# Patient Record
Sex: FEMALE | Race: WHITE | NOT HISPANIC OR LATINO | Employment: FULL TIME | URBAN - METROPOLITAN AREA
[De-identification: names, ages, dates, MRNs, and addresses within clinical notes are randomized per-mention and may not be internally consistent; named-entity substitution may affect disease eponyms.]

---

## 2019-02-15 ENCOUNTER — COMMUNICATION - HEALTHEAST (OUTPATIENT)
Dept: UROLOGY | Facility: CLINIC | Age: 39
End: 2019-02-15

## 2019-02-18 ENCOUNTER — COMMUNICATION - HEALTHEAST (OUTPATIENT)
Dept: UROLOGY | Facility: CLINIC | Age: 39
End: 2019-02-18

## 2019-02-20 ENCOUNTER — COMMUNICATION - HEALTHEAST (OUTPATIENT)
Dept: UROLOGY | Facility: CLINIC | Age: 39
End: 2019-02-20

## 2021-05-27 ENCOUNTER — RECORDS - HEALTHEAST (OUTPATIENT)
Dept: ADMINISTRATIVE | Facility: CLINIC | Age: 41
End: 2021-05-27

## 2021-06-02 ENCOUNTER — RECORDS - HEALTHEAST (OUTPATIENT)
Dept: ADMINISTRATIVE | Facility: CLINIC | Age: 41
End: 2021-06-02

## 2021-06-03 ENCOUNTER — RECORDS - HEALTHEAST (OUTPATIENT)
Dept: ADMINISTRATIVE | Facility: CLINIC | Age: 41
End: 2021-06-03

## 2021-06-24 NOTE — TELEPHONE ENCOUNTER
Tried to call patient several times to get her in for a follow up from ED visit. Patients number on file is wrong, a man answered asking for us not to call anymore.

## 2021-06-24 NOTE — TELEPHONE ENCOUNTER
Attempted to call patient for ED f/u.  Main phone number is not correct for patient.  Message left with emergency contact to have patient call the clinic for an appointment.  Tatiana Simpson RN

## 2021-12-31 ENCOUNTER — VIRTUAL VISIT (OUTPATIENT)
Dept: FAMILY MEDICINE | Facility: CLINIC | Age: 41
End: 2021-12-31

## 2021-12-31 DIAGNOSIS — Z91.199 NO-SHOW FOR APPOINTMENT: Primary | ICD-10-CM

## 2022-07-24 ENCOUNTER — HOSPITAL ENCOUNTER (INPATIENT)
Facility: CLINIC | Age: 42
LOS: 2 days | Discharge: HOME | DRG: 386 | End: 2022-07-26
Attending: EMERGENCY MEDICINE | Admitting: INTERNAL MEDICINE
Payer: COMMERCIAL

## 2022-07-24 ENCOUNTER — APPOINTMENT (OUTPATIENT)
Dept: CT IMAGING | Facility: CLINIC | Age: 42
DRG: 386 | End: 2022-07-24
Attending: EMERGENCY MEDICINE
Payer: COMMERCIAL

## 2022-07-24 DIAGNOSIS — K92.2 LOWER GI BLEED: ICD-10-CM

## 2022-07-24 DIAGNOSIS — D62 ANEMIA DUE TO BLOOD LOSS, ACUTE: ICD-10-CM

## 2022-07-24 DIAGNOSIS — K62.5 BRIGHT RED BLOOD PER RECTUM: Primary | ICD-10-CM

## 2022-07-24 PROBLEM — F90.9 ADHD (ATTENTION DEFICIT HYPERACTIVITY DISORDER): Status: ACTIVE | Noted: 2022-07-24

## 2022-07-24 PROBLEM — N92.0 MENORRHAGIA WITH REGULAR CYCLE: Chronic | Status: ACTIVE | Noted: 2022-07-24

## 2022-07-24 PROBLEM — N92.0 MENORRHAGIA WITH REGULAR CYCLE: Status: ACTIVE | Noted: 2022-07-24

## 2022-07-24 PROBLEM — F39 MOOD DISORDER (H): Status: ACTIVE | Noted: 2022-07-24

## 2022-07-24 PROBLEM — F10.11 HISTORY OF ALCOHOL ABUSE: Status: ACTIVE | Noted: 2022-07-24

## 2022-07-24 LAB
ABO/RH(D): NORMAL
ALBUMIN SERPL-MCNC: 3.3 G/DL (ref 3.5–5)
ALP SERPL-CCNC: 78 U/L (ref 45–120)
ALT SERPL W P-5'-P-CCNC: 10 U/L (ref 0–45)
ANION GAP SERPL CALCULATED.3IONS-SCNC: 9 MMOL/L (ref 5–18)
ANTIBODY SCREEN: NEGATIVE
AST SERPL W P-5'-P-CCNC: 16 U/L (ref 0–40)
ATRIAL RATE - MUSE: 91 BPM
BASOPHILS # BLD AUTO: 0.1 10E3/UL (ref 0–0.2)
BASOPHILS NFR BLD AUTO: 1 %
BILIRUB SERPL-MCNC: 0.2 MG/DL (ref 0–1)
BLD PROD TYP BPU: NORMAL
BLD PROD TYP BPU: NORMAL
BLOOD COMPONENT TYPE: NORMAL
BLOOD COMPONENT TYPE: NORMAL
BUN SERPL-MCNC: 11 MG/DL (ref 8–22)
CALCIUM SERPL-MCNC: 9 MG/DL (ref 8.5–10.5)
CHLORIDE BLD-SCNC: 106 MMOL/L (ref 98–107)
CO2 SERPL-SCNC: 23 MMOL/L (ref 22–31)
CODING SYSTEM: NORMAL
CODING SYSTEM: NORMAL
CREAT SERPL-MCNC: 0.71 MG/DL (ref 0.6–1.1)
CROSSMATCH: NORMAL
CROSSMATCH: NORMAL
DIASTOLIC BLOOD PRESSURE - MUSE: 73 MMHG
EOSINOPHIL # BLD AUTO: 0.7 10E3/UL (ref 0–0.7)
EOSINOPHIL NFR BLD AUTO: 9 %
ERYTHROCYTE [DISTWIDTH] IN BLOOD BY AUTOMATED COUNT: 18.5 % (ref 10–15)
GFR SERPL CREATININE-BSD FRML MDRD: >90 ML/MIN/1.73M2
GLUCOSE BLD-MCNC: 86 MG/DL (ref 70–125)
HCG SERPL QL: NEGATIVE
HCT VFR BLD AUTO: 24.4 % (ref 35–47)
HGB BLD-MCNC: 10 G/DL (ref 11.7–15.7)
HGB BLD-MCNC: 6.8 G/DL (ref 11.7–15.7)
HOLD SPECIMEN: NORMAL
IMM GRANULOCYTES # BLD: 0 10E3/UL
IMM GRANULOCYTES NFR BLD: 0 %
INR PPP: 1.03 (ref 0.85–1.15)
INTERPRETATION ECG - MUSE: NORMAL
ISSUE DATE AND TIME: NORMAL
ISSUE DATE AND TIME: NORMAL
LYMPHOCYTES # BLD AUTO: 1.5 10E3/UL (ref 0.8–5.3)
LYMPHOCYTES NFR BLD AUTO: 21 %
MCH RBC QN AUTO: 19.8 PG (ref 26.5–33)
MCHC RBC AUTO-ENTMCNC: 27.9 G/DL (ref 31.5–36.5)
MCV RBC AUTO: 71 FL (ref 78–100)
MONOCYTES # BLD AUTO: 0.8 10E3/UL (ref 0–1.3)
MONOCYTES NFR BLD AUTO: 11 %
NEUTROPHILS # BLD AUTO: 4.3 10E3/UL (ref 1.6–8.3)
NEUTROPHILS NFR BLD AUTO: 58 %
NRBC # BLD AUTO: 0 10E3/UL
NRBC BLD AUTO-RTO: 0 /100
P AXIS - MUSE: 25 DEGREES
PLATELET # BLD AUTO: 452 10E3/UL (ref 150–450)
POTASSIUM BLD-SCNC: 3.7 MMOL/L (ref 3.5–5)
PR INTERVAL - MUSE: 130 MS
PROT SERPL-MCNC: 6.9 G/DL (ref 6–8)
QRS DURATION - MUSE: 72 MS
QT - MUSE: 358 MS
QTC - MUSE: 440 MS
R AXIS - MUSE: 59 DEGREES
RBC # BLD AUTO: 3.44 10E6/UL (ref 3.8–5.2)
SARS-COV-2 RNA RESP QL NAA+PROBE: NEGATIVE
SODIUM SERPL-SCNC: 138 MMOL/L (ref 136–145)
SPECIMEN EXPIRATION DATE: NORMAL
SYSTOLIC BLOOD PRESSURE - MUSE: 117 MMHG
T AXIS - MUSE: 68 DEGREES
UNIT ABO/RH: NORMAL
UNIT ABO/RH: NORMAL
UNIT NUMBER: NORMAL
UNIT NUMBER: NORMAL
UNIT STATUS: NORMAL
UNIT STATUS: NORMAL
UNIT TYPE ISBT: 7300
UNIT TYPE ISBT: 7300
VENTRICULAR RATE- MUSE: 91 BPM
WBC # BLD AUTO: 7.4 10E3/UL (ref 4–11)

## 2022-07-24 PROCEDURE — 250N000013 HC RX MED GY IP 250 OP 250 PS 637: Performed by: INTERNAL MEDICINE

## 2022-07-24 PROCEDURE — 250N000011 HC RX IP 250 OP 636: Performed by: EMERGENCY MEDICINE

## 2022-07-24 PROCEDURE — 258N000003 HC RX IP 258 OP 636: Performed by: INTERNAL MEDICINE

## 2022-07-24 PROCEDURE — 85018 HEMOGLOBIN: CPT | Performed by: INTERNAL MEDICINE

## 2022-07-24 PROCEDURE — 96374 THER/PROPH/DIAG INJ IV PUSH: CPT | Mod: 59

## 2022-07-24 PROCEDURE — 250N000011 HC RX IP 250 OP 636: Performed by: INTERNAL MEDICINE

## 2022-07-24 PROCEDURE — 120N000001 HC R&B MED SURG/OB

## 2022-07-24 PROCEDURE — P9016 RBC LEUKOCYTES REDUCED: HCPCS | Performed by: EMERGENCY MEDICINE

## 2022-07-24 PROCEDURE — 80053 COMPREHEN METABOLIC PANEL: CPT | Performed by: EMERGENCY MEDICINE

## 2022-07-24 PROCEDURE — 86923 COMPATIBILITY TEST ELECTRIC: CPT | Performed by: EMERGENCY MEDICINE

## 2022-07-24 PROCEDURE — 85610 PROTHROMBIN TIME: CPT | Performed by: EMERGENCY MEDICINE

## 2022-07-24 PROCEDURE — 84703 CHORIONIC GONADOTROPIN ASSAY: CPT | Performed by: EMERGENCY MEDICINE

## 2022-07-24 PROCEDURE — 96375 TX/PRO/DX INJ NEW DRUG ADDON: CPT

## 2022-07-24 PROCEDURE — 85025 COMPLETE CBC W/AUTO DIFF WBC: CPT | Performed by: EMERGENCY MEDICINE

## 2022-07-24 PROCEDURE — 86850 RBC ANTIBODY SCREEN: CPT | Performed by: EMERGENCY MEDICINE

## 2022-07-24 PROCEDURE — 258N000003 HC RX IP 258 OP 636: Performed by: EMERGENCY MEDICINE

## 2022-07-24 PROCEDURE — 99285 EMERGENCY DEPT VISIT HI MDM: CPT | Mod: 25

## 2022-07-24 PROCEDURE — 87040 BLOOD CULTURE FOR BACTERIA: CPT | Performed by: EMERGENCY MEDICINE

## 2022-07-24 PROCEDURE — 36415 COLL VENOUS BLD VENIPUNCTURE: CPT | Performed by: EMERGENCY MEDICINE

## 2022-07-24 PROCEDURE — 99223 1ST HOSP IP/OBS HIGH 75: CPT | Performed by: INTERNAL MEDICINE

## 2022-07-24 PROCEDURE — 36415 COLL VENOUS BLD VENIPUNCTURE: CPT | Performed by: INTERNAL MEDICINE

## 2022-07-24 PROCEDURE — 74174 CTA ABD&PLVS W/CONTRAST: CPT

## 2022-07-24 PROCEDURE — 96361 HYDRATE IV INFUSION ADD-ON: CPT

## 2022-07-24 PROCEDURE — 250N000013 HC RX MED GY IP 250 OP 250 PS 637: Performed by: PHYSICIAN ASSISTANT

## 2022-07-24 PROCEDURE — 250N000013 HC RX MED GY IP 250 OP 250 PS 637: Performed by: EMERGENCY MEDICINE

## 2022-07-24 PROCEDURE — 93005 ELECTROCARDIOGRAM TRACING: CPT | Performed by: EMERGENCY MEDICINE

## 2022-07-24 PROCEDURE — U0003 INFECTIOUS AGENT DETECTION BY NUCLEIC ACID (DNA OR RNA); SEVERE ACUTE RESPIRATORY SYNDROME CORONAVIRUS 2 (SARS-COV-2) (CORONAVIRUS DISEASE [COVID-19]), AMPLIFIED PROBE TECHNIQUE, MAKING USE OF HIGH THROUGHPUT TECHNOLOGIES AS DESCRIBED BY CMS-2020-01-R: HCPCS | Performed by: EMERGENCY MEDICINE

## 2022-07-24 PROCEDURE — 36430 TRANSFUSION BLD/BLD COMPNT: CPT

## 2022-07-24 PROCEDURE — C9803 HOPD COVID-19 SPEC COLLECT: HCPCS

## 2022-07-24 RX ORDER — LIDOCAINE 40 MG/G
CREAM TOPICAL
Status: DISCONTINUED | OUTPATIENT
Start: 2022-07-24 | End: 2022-07-26 | Stop reason: HOSPADM

## 2022-07-24 RX ORDER — POLYETHYLENE GLYCOL 3350 17 G/17G
238 POWDER, FOR SOLUTION ORAL ONCE
Status: COMPLETED | OUTPATIENT
Start: 2022-07-24 | End: 2022-07-24

## 2022-07-24 RX ORDER — SODIUM CHLORIDE 9 MG/ML
INJECTION, SOLUTION INTRAVENOUS CONTINUOUS
Status: DISCONTINUED | OUTPATIENT
Start: 2022-07-24 | End: 2022-07-26 | Stop reason: HOSPADM

## 2022-07-24 RX ORDER — ACETAMINOPHEN 325 MG/1
650 TABLET ORAL EVERY 4 HOURS PRN
Status: DISCONTINUED | OUTPATIENT
Start: 2022-07-24 | End: 2022-07-26 | Stop reason: HOSPADM

## 2022-07-24 RX ORDER — BISACODYL 5 MG
10 TABLET, DELAYED RELEASE (ENTERIC COATED) ORAL ONCE
Status: COMPLETED | OUTPATIENT
Start: 2022-07-24 | End: 2022-07-24

## 2022-07-24 RX ORDER — ONDANSETRON 2 MG/ML
4 INJECTION INTRAMUSCULAR; INTRAVENOUS
Status: DISCONTINUED | OUTPATIENT
Start: 2022-07-24 | End: 2022-07-25 | Stop reason: HOSPADM

## 2022-07-24 RX ORDER — LIDOCAINE HYDROCHLORIDE 20 MG/ML
5 SOLUTION OROPHARYNGEAL ONCE
Status: DISCONTINUED | OUTPATIENT
Start: 2022-07-24 | End: 2022-07-26 | Stop reason: HOSPADM

## 2022-07-24 RX ORDER — HYDROCODONE BITARTRATE AND ACETAMINOPHEN 5; 325 MG/1; MG/1
1 TABLET ORAL ONCE
Status: COMPLETED | OUTPATIENT
Start: 2022-07-24 | End: 2022-07-24

## 2022-07-24 RX ORDER — ONDANSETRON 2 MG/ML
4 INJECTION INTRAMUSCULAR; INTRAVENOUS EVERY 30 MIN PRN
Status: DISCONTINUED | OUTPATIENT
Start: 2022-07-24 | End: 2022-07-26 | Stop reason: HOSPADM

## 2022-07-24 RX ORDER — MAGNESIUM CARB/ALUMINUM HYDROX 105-160MG
296 TABLET,CHEWABLE ORAL ONCE
Status: COMPLETED | OUTPATIENT
Start: 2022-07-25 | End: 2022-07-25

## 2022-07-24 RX ORDER — HYDROCODONE BITARTRATE AND ACETAMINOPHEN 5; 325 MG/1; MG/1
1 TABLET ORAL EVERY 6 HOURS PRN
Status: DISCONTINUED | OUTPATIENT
Start: 2022-07-24 | End: 2022-07-26 | Stop reason: HOSPADM

## 2022-07-24 RX ORDER — PROCHLORPERAZINE MALEATE 5 MG
5 TABLET ORAL EVERY 6 HOURS PRN
Status: DISCONTINUED | OUTPATIENT
Start: 2022-07-24 | End: 2022-07-26 | Stop reason: HOSPADM

## 2022-07-24 RX ORDER — LIDOCAINE 40 MG/G
CREAM TOPICAL
Status: DISCONTINUED | OUTPATIENT
Start: 2022-07-24 | End: 2022-07-25 | Stop reason: HOSPADM

## 2022-07-24 RX ORDER — IOPAMIDOL 755 MG/ML
100 INJECTION, SOLUTION INTRAVASCULAR ONCE
Status: COMPLETED | OUTPATIENT
Start: 2022-07-24 | End: 2022-07-24

## 2022-07-24 RX ORDER — LORAZEPAM 2 MG/ML
1 INJECTION INTRAMUSCULAR ONCE
Status: COMPLETED | OUTPATIENT
Start: 2022-07-24 | End: 2022-07-24

## 2022-07-24 RX ADMIN — BISACODYL 10 MG: 5 TABLET, COATED ORAL at 14:51

## 2022-07-24 RX ADMIN — LORAZEPAM 1 MG: 2 INJECTION INTRAMUSCULAR; INTRAVENOUS at 10:01

## 2022-07-24 RX ADMIN — HYDROCODONE BITARTRATE AND ACETAMINOPHEN 1 TABLET: 5; 325 TABLET ORAL at 14:33

## 2022-07-24 RX ADMIN — SODIUM CHLORIDE: 9 INJECTION, SOLUTION INTRAVENOUS at 14:35

## 2022-07-24 RX ADMIN — ONDANSETRON 4 MG: 2 INJECTION INTRAMUSCULAR; INTRAVENOUS at 18:41

## 2022-07-24 RX ADMIN — POLYETHYLENE GLYCOL 3350 238 G: 17 POWDER, FOR SOLUTION ORAL at 18:38

## 2022-07-24 RX ADMIN — PROCHLORPERAZINE EDISYLATE 5 MG: 5 INJECTION INTRAMUSCULAR; INTRAVENOUS at 19:56

## 2022-07-24 RX ADMIN — ONDANSETRON 4 MG: 2 INJECTION INTRAMUSCULAR; INTRAVENOUS at 10:24

## 2022-07-24 RX ADMIN — SODIUM CHLORIDE 1000 ML: 9 INJECTION, SOLUTION INTRAVENOUS at 08:58

## 2022-07-24 RX ADMIN — ACETAMINOPHEN 650 MG: 325 TABLET ORAL at 18:41

## 2022-07-24 RX ADMIN — HYDROCODONE BITARTRATE AND ACETAMINOPHEN 1 TABLET: 5; 325 TABLET ORAL at 11:33

## 2022-07-24 RX ADMIN — IOPAMIDOL 100 ML: 755 INJECTION, SOLUTION INTRAVENOUS at 09:42

## 2022-07-24 ASSESSMENT — ENCOUNTER SYMPTOMS
BLOOD IN STOOL: 1
NERVOUS/ANXIOUS: 1
ABDOMINAL PAIN: 1
VOMITING: 0
DIZZINESS: 1
NAUSEA: 1
CONSTIPATION: 1
ANAL BLEEDING: 1
PALPITATIONS: 1
LIGHT-HEADEDNESS: 1
SHORTNESS OF BREATH: 0
HEADACHES: 1
DIARRHEA: 1

## 2022-07-24 ASSESSMENT — ACTIVITIES OF DAILY LIVING (ADL)
ADLS_ACUITY_SCORE: 20
DEPENDENT_IADLS:: INDEPENDENT
ADLS_ACUITY_SCORE: 35
ADLS_ACUITY_SCORE: 20

## 2022-07-24 NOTE — PLAN OF CARE
Pt rated pain 10/10 during shift thus far. Oral norco and rest to help manage pain. Skilled monitoring in all medical conditions. Patient has 2 bumps in her mouth that cause her some pain. SBA/independent in room, full sensation per pt. NS running at 100/hr, IV patent. Patient has hematuria. Education on medication administration and use of call-light to reduce risk for falls and injury. VSS, no shortness of breath.   Jasmeet Rodriguez RN

## 2022-07-24 NOTE — CONSULTS
Care Management Initial Consult    General Information  Assessment completed with: Patient,    Type of CM/SW Visit: Initial Assessment    Primary Care Provider verified and updated as needed: Yes (Given [facility] PCP List)   Readmission within the last 30 days:        Reason for Consult: discharge planning  Advance Care Planning: Advance Care Planning Reviewed: questions answered, other (see comments) (Copy of [facility] given)     General Information Comments: Given [facility] PCP Clinic List    Communication Assessment  Patient's communication style: spoken language (English or Bilingual)    Hearing Difficulty or Deaf: no   Wear Glasses or Blind: yes    Cognitive  Cognitive/Neuro/Behavioral: all  Level of Consciousness: other (see comments) (HX TBI )  Arousal Level: opens eyes spontaneously  Orientation: oriented x 4  Mood/Behavior: anxious     Speech: hyperverbal/rapid, rambling    Living Environment:   People in home: child(lluvia), adult     Current living Arrangements: other (see comments) (Town Home)      Able to return to prior arrangements: yes  Living Arrangement Comments: Lives with son Jerzy    Family/Social Support:  Care provided by: self  Provides care for: no one  Marital Status: Single  Children          Description of Support System: Supportive, Involved    Support Assessment: Adequate family and caregiver support, Patient communicates needs well met    Current Resources:   Patient receiving home care services: No     Community Resources: None  Equipment currently used at home: none  Supplies currently used at home: None    Employment/Financial:  Employment Status: employed full-time        Financial Concerns: No concerns identified           Lifestyle & Psychosocial Needs:  Social Determinants of Health     Tobacco Use: Medium Risk     Smoking Tobacco Use: Former Smoker     Smokeless Tobacco Use: Unknown   Alcohol Use: Not on file   Financial Resource Strain: Not on file   Food  Insecurity: Not on file   Transportation Needs: Not on file   Physical Activity: Not on file   Stress: Not on file   Social Connections: Not on file   Intimate Partner Violence: Not on file   Depression: Not on file   Housing Stability: Not on file       Functional Status:  Prior to admission patient needed assistance:   Dependent ADLs:: Independent  Dependent IADLs:: Independent  Assesssment of Functional Status: Not at  functional baseline    Mental Health Status:          Chemical Dependency Status:                Values/Beliefs:  Spiritual, Cultural Beliefs, Yazdanism Practices, Values that affect care:                 Additional Information:  Alert oriented patient lives in a town home with irina Bertrand. Presents to  ED due to rectal bleeding, abdominal pain, and fatigue. States she is independent with I/ADLs; no assistive devices; does drive. Given [facility] Choices information. Plans to return home with irina Bertrand transporting patient on discharge. Other needs pending clinical progress.      FRIDA HESS, RN/CM

## 2022-07-24 NOTE — CONSULTS
[facility] Consultation     Nicki Castellanos  [address]  [place] [address]  42 year old female     Admission Date/Time: 7/24/2022  Primary Care Provider: No Ref-Primary, Physician     We were asked to see the patient in consultation by  ED for evaluation of colitis and rectal bleeding.    ASSESSMENT:    Nicki Castellanos is a 42 year old female with PMH of TBI 2010 who presents to ED on 7/24/22 for rectal bleeding, abdominal pains and fatigue.      1. Rectal bleeding  2. Colitis  - CTA abd/pelvis with wall thickening of distal sigmoid colon and rectum without active bleeding. Adjacent adenopathy also noted raising concerns for underlying malignancy.  - No previous colonoscopy  - Differential: IBD, ischemic colitis, acute infectious etiology, vs malignancy. Would recommend colonoscopy for further evaluation.     3. Anemia, microcytic  - Likely related to rectal bleeding  - Receiving 2 units of PRBC today    4. Chronic constipation  - This has been a chronic issue since she was young.   - She is not on any dedicated bowel regimen.     RECOMMENDATIONS:  - Would recommend proceeding with a colonoscopy. Plan for bowel prep this evening and NPO after midnight for procedure.     - Monitor Hgb and transfuse as necessary    - More recommendations to follow colonoscopy    - For constipation: would recommend Miralax 1 capful 1-2 times daily      Case discussed with Dr. Knight, please review MD addendum below.    Approximately 45 minutes of total time was spent providing patient care, including patient evaluation, reviewing documentation/test results, and     Bruce Moscoso PA-C  [facility]  [phone]  ________________________________________________________________________        CC: Rectal bleeding and fatigue     HPI:  Nicki Castellanos is a 42 year old female with PMH of TBI 2010 who presents to ED on 7/24/22 for rectal bleeding, abdominal pains and fatigue. CTA abd/pelvis with  "wall thickening of distal sigmoid colon and rectum without active bleeding. Adjacent adenopathy also noted raising concerns for underlying malignancy.     Pt explains for the past 4+ months, she has noticed intermittent bouts of BRBPR. Most recent bout was today, passing \"nothing but blood\". She also has associated diffuse abdominal pains, bloating, and progressive weakness/fatigue. No fevers, chills, nausea, vomiting, or change in appetite. She has struggled with constipation her entire life and suspected her symptoms were due constipation and straining.  On average, she estimates about 1 small BM every 2 weeks. She has tried prune juice and stool softeners with mild-to-no relief of constipation and has mainly been just dealing with it. She denies history of hemorrhoids. She states ED provider did rectal exam without hemorrhoids.   She denies prior history of GI bleeding. She has never had a colonoscopy. She does have a history of NSAID use with Ibuprofen 200mg ~2 tablets per week for migraines. No family history of IBD, autoimmune disorders, or colon cancer.     In the ED:  -- Vitals stable. Afebrile, BP and HR normal.   -- Labs with Hgb 6.8 (Last hgb in 2019 was 11.7). WBC normal, platelets 452, MCV 71. INR 1.03. CMP normal. HCG normal.   -- CTA abd/pelvis with findings consistent with colitis involving sigmoid and rectum without active bleeding; adjacent adenopathy noted raising concerns for underlying malignancy.      ROS: A comprehensive ten point review of systems was negative aside from those in mentioned in the HPI.      PAST MEDICAL HISTORY:  Patient Active Problem List    Diagnosis Date Noted     Bright red blood per rectum 07/24/2022     Priority: Medium     Cervical high risk HPV (human papillomavirus) test positive 01/06/2015     Priority: Medium     Formatting of this note might be different from the original.  Colposcopy Advised       Headache 02/27/2014     Priority: Medium     Problem list name " "updated by automated process. Provider to review       Benign paroxysmal positional vertigo 02/27/2014     Priority: Medium     Syncope 02/27/2014     Priority: Medium     Moderate dysplasia of cervix 03/08/2012     Priority: Medium     Formatting of this note might be different from the original.  Cryo 1/2011  JUDI 2  JUDI I 3/2012 Pap + HPV in 1 year.   ASC + HPV 2/2012 Pap smear showed LSIL  Pap smear + HPV due 2/2013+ UNS + HPV  Colpo 7/2013 = JUDI I Plan for pap + HPV in 1 year- due 7/2014       Anxiety state 08/15/2011     Priority: Medium     Insomnia, unspecified 08/15/2011     Priority: Medium     Hx of traumatic brain injury 08/15/2011     Priority: Medium     SOCIAL HISTORY:  Social History     Substance Use Topics     Alcohol use: No     FAMILY HISTORY:  No family history on file.  ALLERGIES:   Allergies   Allergen Reactions     Gabapentin Other (See Comments)     Depression     MEDICATIONS:   Current Facility-Administered Medications   Medication     ondansetron (ZOFRAN) injection 4 mg     Current Outpatient Medications   Medication     INDOMETHACIN PO     traZODone (DESYREL) 100 MG tablet     zonisamide (ZONEGRAN) 50 MG capsule       PHYSICAL EXAM:   /77   Pulse 82   Temp 98.6  F (37  C) (Oral)   Resp 16   Ht 1.575 m (5' 2\")   Wt 68 kg (150 lb)   LMP 07/23/2022   SpO2 99%   BMI 27.44 kg/m     GEN: Alert, oriented x3, communicative and in NAD. Appears anxious/nervous.   IRIS: AT, anicteric, OP without erythema, exudate, or ulcers.    NECK: Supple.    LYMPH: No LAD noted.  HRT: RRR  LUNGS: CTA  ABD:  ND, +BS, + pain to palpation, no rebound, no HSM.  SKIN: No rash, jaundice or spider angiomata  MSKL: LE free of edema, strength 5/5 all 4 extrems  NEURO: CN grossly intact, sensation intact to light touch, toes downgoing.     ADDITIONAL DATA:   I reviewed the patient's new clinical lab test results.   Recent Labs   Lab Test 07/24/22  0822 02/14/19  1233 02/14/19  1233   WBC 7.4 5.6  --    HGB 6.8* " 11.7*  --    MCV 71* 84  --    * 216  --    INR 1.03  --  1.08     Recent Labs   Lab Test 07/24/22  0822 02/14/19  1233   POTASSIUM 3.7 3.4*   CHLORIDE 106 106   CO2 23 21*   BUN 11 12   CR 0.71 0.61   ANIONGAP 9 12     Recent Labs   Lab Test 07/24/22  0822 02/14/19  1233 02/14/19  1233   ALBUMIN 3.3*  --  3.5   BILITOTAL 0.2  --  0.3   ALT 10  --  19   AST 16  --  18   PROTEIN  --  100 mg/dL*  --    LIPASE  --   --  30        IMAGING:  I reviewed the patient's new imaging results.      EXAM: CTA ABDOMEN PELVIS WITH CONTRAST  LOCATION: [facility]  DATE/TIME: 7/24/2022 9:21 AM     INDICATION: Lower GI bleeding, anemia, abdominal pain  COMPARISON: 2/14/2019  TECHNIQUE: CT angiogram abdomen pelvis during arterial phase of injection of IV contrast. 2D and 3D MIP reconstructions were performed by the CT technologist. Dose reduction techniques were used.  CONTRAST: 100 mL Isovue-370 IV     FINDINGS:  ANGIOGRAM ABDOMEN/PELVIS: Normal aorta. Patent celiac, SMA, CORNELIA, and single bilateral renal arteries. Patent common, internal and external iliac arteries.     LOWER CHEST: Stable scarring in the right lower lobe.     HEPATOBILIARY: Normal.     PANCREAS: Normal.     SPLEEN: Normal.     ADRENAL GLANDS: Normal.     KIDNEYS/BLADDER: There is a 3 mm nonobstructing stone in the upper pole of the right kidney. There is an 8 mm low-attenuation lesion in the lateral midpole of the right kidney on image 63 series 5 which is not seen on the prior study. It measures higher   than water density at 87 Hounsfield units and is therefore indeterminate. Stable small low-attenuation lesion in the midpole of the left kidney most compatible with a cyst which does not require specific follow-up. No hydronephrosis. Normal ureters and   bladder.     BOWEL: Normal stomach, small bowel, and appendix. There is wall thickening of the distal sigmoid colon and rectum which is new from the prior study. There is no  extravasation of contrast to suggest active bleeding. Findings are most compatible with   colitis. However, there is considerable adjacent adenopathy. Therefore, underlying malignancy cannot be completely excluded. Correlation with colonoscopy is recommended.     LYMPH NODES: There are multiple enlarged mesorectal lymph nodes, for example measuring 1.5 x 1 cm on image 157 series 5 and measuring 1.8 x 1.1 cm on image 159 series 5. Small subcentimeter retroperitoneal, common and external iliac lymph nodes are seen   as well.     PELVIC ORGANS: Uterus and adnexa are unremarkable. No free fluid is seen in the pelvis.     MUSCULOSKELETAL: Degenerative change in the spine most severe at L5-S1.                                                                      IMPRESSION:  1.  Wall thickening of the distal sigmoid colon and rectum without active bleeding. While findings would be compatible with colitis, there is considerable adjacent adenopathy raising concern for underlying malignancy. Correlation with colonoscopy is   recommended.  2.  Nonobstructing stone in the right kidney.  3.  Indeterminate lesion in the midpole of the right kidney. Dedicated MRI is recommended for further assessment.       _______________________________________________________________  [facility] Attending  Patient is seen anddiscussed with the PA/CNP, see note above.  Diarrhea and rectal bleeding for months, now with severe anemia.  Mild lower abd pain.    Exam: Abdomen soft, minimal lower abd tenderness  Labs and imaging reviewed.  Assessment/Plan:  Colitis with bloody diarrhea and severe blood loss anemia.  Will plan colonoscopy tomorrow, see prep orders.     Total visit time spent with patient is 10min.     Howie Knight MD  [facility]  Office: [phone]  Cell:[phone]
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
## 2022-07-24 NOTE — PHARMACY-ADMISSION MEDICATION HISTORY
Pharmacy Note - Admission Medication History    Pertinent Provider Information: confirmed with patient she is not taking any medications currently     ______________________________________________________________________    Prior To Admission (PTA) med list completed and updated in EMR.       No outpatient medications have been marked as taking for the 7/24/22 encounter (Hospital Encounter).       Information source(s): [facility]  Method of interview communication: in-person    Summary of Changes to PTA Med List  New: n/a   Discontinued: indomethacin  Changed: zonisamide    Patient was asked about OTC/herbal products specifically.  PTA med list reflects this.    In the past week, patient estimated taking medication this percent of the time:  greater than 90%.    Allergies were reviewed, assessed, and updated with the patient.      Patient does not use any multi-dose medications prior to admission.    The information provided in this note is only as accurate as the sources available at the time of the update(s).    Thank you for the opportunity to participate in the care of this patient.    Brittaney Alexandra  7/24/2022 12:15 PM

## 2022-07-24 NOTE — ED PROVIDER NOTES
EMERGENCY DEPARTMENT ENCOUNTER       ED Course & Medical Decision Making     I saw and examined the patient.  IV was established and she was placed on the monitor.  Diagnostics ordered.  I did independently interpret EKG done today at 849.  Normal sinus rhythm with a rate of 91 bpm.  Intervals and axis are normal.  No significant ST elevation or depression.  No pathological Q waves.  Similar to EKG from May 2012.    Hemoglobin returns at 6.8.  Her last hemoglobin was over 11.  She is symptomatic.  There is no obvious external source for her bleeding.  I will do a CTA of her abdomen to look for any obvious source of bleeding and after consents plan is to transfuse her 2 units PRBCs.    Given her significant, symptomatic anemia and active bleeding I do feel that admission is indicated with GI consultation.  I will speak to admitting physician and gastroenterology to get this all aligned.      8:30 AM The PA student met with the patient, obtained history, performed an initial exam, and discussed options and plan for diagnostics and treatment here in the ED. The PA student then reported back to me and informed me of the patient's situation.        Prior to making a final disposition on this patient the results of patient's tests and other diagnostic studies were discussed with the patient. All questions were answered. Patient expressed understanding of the plan and was amenable to it.          Critical Care     I, Ronal Miller M.D., have personally provided 35 minutes of critical care time exclusive of time spent on separately billable procedures.  Indication is symptomatic anemia with hemoglobin of 6.8 and transfusion time includes review of laboratory data, radiology results, discussion with consultants, and monitoring for potential decompensation. Interventions were performed as documented above.      Medications   ondansetron (ZOFRAN) injection 4 mg (4 mg Intravenous Given 7/24/22 1024)   0.9% sodium chloride  BOLUS (0 mLs Intravenous Stopped 7/24/22 1003)   iopamidol (ISOVUE-370) solution 100 mL (100 mLs Intravenous Given 7/24/22 0942)   LORazepam (ATIVAN) injection 1 mg (1 mg Intravenous Given 7/24/22 1001)   HYDROcodone-acetaminophen (NORCO) 5-325 MG per tablet 1 tablet (1 tablet Oral Given 7/24/22 1133)       Final Impression     1. Lower GI bleed    2. Anemia due to blood loss, acute            Chief Complaint     Chief Complaint   Patient presents with     Rectal Bleeding       Rectal bleed x 4 months, liquid now. Pain with BM. No thinners. Abd pain also.          HPI       Nicki Castellanos is a 42 year old female who presents for evaluation of of rectal bleeding. She had chronic constipation for ~4 months but now she has bloody, all liquid stool and passes clots that way. She has also diffuse lower abdominal pain with bowel movements.     Of note, she has chronic migraines with associated dizziness, nausea, lightheadedness, and palpitations. These symptoms are also present when she stands up quickly. She has also been taking ibuprofen for years for her headaches and that provides slight relief. Patient denies chest pain, vomiting, shortness of breath, or any trauma to the head. She also denies smoking or using alcohol.    IMoo am serving as a scribe to document services personally performed by Ronal Miller M.D. based on my observation and the provider's statements to me. IRonal M.D attest that Moo Nunez is acting in a scribe capacity, has observed my performance of the services and has documented them in accordance with my direction.    Past Medical History     No past medical history on file.  No past surgical history on file.  No family history on file.   Social History     Substance Use Topics     Alcohol use: No       Relevant past medical, surgical, family and social history as documented above, has been reviewed and discussed with patient. No changes or additions, unless  "otherwise noted in the HPI.    Current Medications     INDOMETHACIN PO  traZODone (DESYREL) 100 MG tablet  zonisamide (ZONEGRAN) 50 MG capsule        Allergies     Allergies   Allergen Reactions     Gabapentin Other (See Comments)     Depression       Review of Systems     Review of Systems   Respiratory: Negative for shortness of breath.    Cardiovascular: Positive for palpitations. Negative for chest pain.   Gastrointestinal: Positive for abdominal pain, anal bleeding, blood in stool, constipation, diarrhea and nausea. Negative for vomiting.   Neurological: Positive for dizziness, light-headedness and headaches.   Psychiatric/Behavioral: The patient is nervous/anxious.    All other systems reviewed and are negative.       Remainder of systems reviewed, unless noted in HPI all others negative.    Physical Exam     /77   Pulse 82   Temp 98.6  F (37  C) (Oral)   Resp 16   Ht 1.575 m (5' 2\")   Wt 68 kg (150 lb)   LMP 07/23/2022   SpO2 99%   BMI 27.44 kg/m      Physical Exam  Vitals and nursing note reviewed.   HENT:      Head: Normocephalic.      Nose: Nose normal.   Cardiovascular:      Rate and Rhythm: Normal rate.   Pulmonary:      Effort: Pulmonary effort is normal.   Neurological:      Mental Status: She is alert. Mental status is at baseline.   Psychiatric:         Mood and Affect: Mood normal.             Labs & Imaging         Labs Ordered and Resulted from Time of ED Arrival to Time of ED Departure   COMPREHENSIVE METABOLIC PANEL - Abnormal       Result Value    Sodium 138      Potassium 3.7      Chloride 106      Carbon Dioxide (CO2) 23      Anion Gap 9      Urea Nitrogen 11      Creatinine 0.71      Calcium 9.0      Glucose 86      Alkaline Phosphatase 78      AST 16      ALT 10      Protein Total 6.9      Albumin 3.3 (*)     Bilirubin Total 0.2      GFR Estimate >90     CBC WITH PLATELETS AND DIFFERENTIAL - Abnormal    WBC Count 7.4      RBC Count 3.44 (*)     Hemoglobin 6.8 (*)     " Hematocrit 24.4 (*)     MCV 71 (*)     MCH 19.8 (*)     MCHC 27.9 (*)     RDW 18.5 (*)     Platelet Count 452 (*)     % Neutrophils 58      % Lymphocytes 21      % Monocytes 11      % Eosinophils 9      % Basophils 1      % Immature Granulocytes 0      NRBCs per 100 WBC 0      Absolute Neutrophils 4.3      Absolute Lymphocytes 1.5      Absolute Monocytes 0.8      Absolute Eosinophils 0.7      Absolute Basophils 0.1      Absolute Immature Granulocytes 0.0      Absolute NRBCs 0.0     INR - Normal    INR 1.03     HCG QUALITATIVE PREGNANCY - Normal    hCG Serum Qualitative Negative     COVID-19 VIRUS (CORONAVIRUS) BY PCR   TYPE AND SCREEN, ADULT    ABO/RH(D) B POS      Antibody Screen Negative      SPECIMEN EXPIRATION DATE [id number]     PREPARE RED BLOOD CELLS (UNIT)    CROSSMATCH Compatible      UNIT ABO/RH B Pos      Unit Number [id number]      Unit Status Issued      Blood Component Type Red Blood Cells      Product Code D2542S30      CODING SYSTEM [id number]      UNIT TYPE ISBT 7300      ISSUE DATE AND TIME 20220724100100     PREPARE RED BLOOD CELLS (UNIT)    CROSSMATCH Compatible      UNIT ABO/RH B Pos      Unit Number [id number]      Unit Status Ready      Blood Component Type Red Blood Cells      Product Code [id number]      CODING SYSTEM [id number]      UNIT TYPE ISBT 7300     PREPARE RED BLOOD CELLS (UNIT)   BLOOD CULTURE   TRANSFUSE RED BLOOD CELLS (UNIT)   ABO/RH TYPE AND SCREEN         Results for orders placed or performed during the hospital encounter of 07/24/22   CTA Abdomen Pelvis with Contrast    Impression    IMPRESSION:  1.  Wall thickening of the distal sigmoid colon and rectum without active bleeding. While findings would be compatible with colitis, there is considerable adjacent adenopathy raising concern for underlying malignancy. Correlation with colonoscopy is   recommended.  2.  Nonobstructing stone in the right kidney.  3.  Indeterminate lesion in the midpole of the right kidney. Dedicated  MRI is recommended for further assessment.    NOTE: ABNORMAL REPORT    1.  THE DICTATION ABOVE DESCRIBES AN ABNORMALITY FOR WHICH FOLLOW-UP IS NEEDED.    Result Value Ref Range    INR 1.03 0.85 - 1.15   Comprehensive metabolic panel   Result Value Ref Range    Sodium 138 136 - 145 mmol/L    Potassium 3.7 3.5 - 5.0 mmol/L    Chloride 106 98 - 107 mmol/L    Carbon Dioxide (CO2) 23 22 - 31 mmol/L    Anion Gap 9 5 - 18 mmol/L    Urea Nitrogen 11 8 - 22 mg/dL    Creatinine 0.71 0.60 - 1.10 mg/dL    Calcium 9.0 8.5 - 10.5 mg/dL    Glucose 86 70 - 125 mg/dL    Alkaline Phosphatase 78 45 - 120 U/L    AST 16 0 - 40 U/L    ALT 10 0 - 45 U/L    Protein Total 6.9 6.0 - 8.0 g/dL    Albumin 3.3 (L) 3.5 - 5.0 g/dL    Bilirubin Total 0.2 0.0 - 1.0 mg/dL    GFR Estimate >90 >60 mL/min/1.73m2   CBC with platelets and differential   Result Value Ref Range    WBC Count 7.4 4.0 - 11.0 10e3/uL    RBC Count 3.44 (L) 3.80 - 5.20 10e6/uL    Hemoglobin 6.8 (LL) 11.7 - 15.7 g/dL    Hematocrit 24.4 (L) 35.0 - 47.0 %    MCV 71 (L) 78 - 100 fL    MCH 19.8 (L) 26.5 - 33.0 pg    MCHC 27.9 (L) 31.5 - 36.5 g/dL    RDW 18.5 (H) 10.0 - 15.0 %    Platelet Count 452 (H) 150 - 450 10e3/uL    % Neutrophils 58 %    % Lymphocytes 21 %    % Monocytes 11 %    % Eosinophils 9 %    % Basophils 1 %    % Immature Granulocytes 0 %    NRBCs per 100 WBC 0 <1 /100    Absolute Neutrophils 4.3 1.6 - 8.3 10e3/uL    Absolute Lymphocytes 1.5 0.8 - 5.3 10e3/uL    Absolute Monocytes 0.8 0.0 - 1.3 10e3/uL    Absolute Eosinophils 0.7 0.0 - 0.7 10e3/uL    Absolute Basophils 0.1 0.0 - 0.2 10e3/uL    Absolute Immature Granulocytes 0.0 <=0.4 10e3/uL    Absolute NRBCs 0.0 10e3/uL   Extra Red Top Tube   Result Value Ref Range    Hold Specimen     Extra Green Top (Lithium Heparin) Tube   Result Value Ref Range    Hold Specimen JIC    Extra Purple Top Tube   Result Value Ref Range    Hold Specimen JIC    HCG qualitative Blood   Result Value Ref Range    hCG Serum Qualitative Negative  Negative   Extra Green Top (Lithium Heparin) Tube   Result Value Ref Range    Hold Specimen [facility]    Adult Type and Screen   Result Value Ref Range    ABO/RH(D) B POS     Antibody Screen Negative Negative    SPECIMEN EXPIRATION DATE 20220727235900    Prepare red blood cells (unit)   Result Value Ref Range    CROSSMATCH Compatible     UNIT ABO/RH B Pos     Unit Number [id number]     Unit Status Issued     Blood Component Type Red Blood Cells     Product Code [id number]     CODING SYSTEM [id number]     UNIT TYPE ISBT 7300     ISSUE DATE AND TIME 20220724100100    Prepare red blood cells (unit)   Result Value Ref Range    CROSSMATCH Compatible     UNIT ABO/RH B Pos     Unit Number [id number]     Unit Status Ready     Blood Component Type Red Blood Cells     Product Code X9267V44     CODING SYSTEM [id number]     UNIT TYPE ISBT 7300        Ronal Miller MD  Emergency Medicine  [facility] EMERGENCY ROOM  [address] [place] [id number]  [phone]  7/24/2022       Ronal Miller MD  07/24/22 0918       Ronal Miller MD  07/24/22 1138

## 2022-07-25 ENCOUNTER — ANESTHESIA EVENT (OUTPATIENT)
Dept: SURGERY | Facility: CLINIC | Age: 42
DRG: 386 | End: 2022-07-25
Payer: COMMERCIAL

## 2022-07-25 ENCOUNTER — APPOINTMENT (OUTPATIENT)
Dept: MRI IMAGING | Facility: CLINIC | Age: 42
DRG: 386 | End: 2022-07-25
Attending: INTERNAL MEDICINE
Payer: COMMERCIAL

## 2022-07-25 ENCOUNTER — ANESTHESIA (OUTPATIENT)
Dept: SURGERY | Facility: CLINIC | Age: 42
DRG: 386 | End: 2022-07-25
Payer: COMMERCIAL

## 2022-07-25 LAB
ANION GAP SERPL CALCULATED.3IONS-SCNC: 9 MMOL/L (ref 5–18)
BUN SERPL-MCNC: 5 MG/DL (ref 8–22)
C REACTIVE PROTEIN LHE: 3.3 MG/DL (ref 0–?)
CALCIUM SERPL-MCNC: 8.7 MG/DL (ref 8.5–10.5)
CHLORIDE BLD-SCNC: 109 MMOL/L (ref 98–107)
CO2 SERPL-SCNC: 20 MMOL/L (ref 22–31)
COLONOSCOPY: NORMAL
CREAT SERPL-MCNC: 0.59 MG/DL (ref 0.6–1.1)
GFR SERPL CREATININE-BSD FRML MDRD: >90 ML/MIN/1.73M2
GLUCOSE BLD-MCNC: 83 MG/DL (ref 70–125)
GLUCOSE BLDC GLUCOMTR-MCNC: 105 MG/DL (ref 70–99)
GLUCOSE BLDC GLUCOMTR-MCNC: 162 MG/DL (ref 70–99)
HGB BLD-MCNC: 10.2 G/DL (ref 11.7–15.7)
HGB BLD-MCNC: 11.3 G/DL (ref 11.7–15.7)
HGB BLD-MCNC: 9.5 G/DL (ref 11.7–15.7)
POTASSIUM BLD-SCNC: 3.5 MMOL/L (ref 3.5–5)
SODIUM SERPL-SCNC: 138 MMOL/L (ref 136–145)

## 2022-07-25 PROCEDURE — 0DBE8ZX EXCISION OF LARGE INTESTINE, VIA NATURAL OR ARTIFICIAL OPENING ENDOSCOPIC, DIAGNOSTIC: ICD-10-PCS | Performed by: INTERNAL MEDICINE

## 2022-07-25 PROCEDURE — 36415 COLL VENOUS BLD VENIPUNCTURE: CPT | Performed by: INTERNAL MEDICINE

## 2022-07-25 PROCEDURE — 258N000003 HC RX IP 258 OP 636: Performed by: ANESTHESIOLOGY

## 2022-07-25 PROCEDURE — 88305 TISSUE EXAM BY PATHOLOGIST: CPT | Mod: TC | Performed by: INTERNAL MEDICINE

## 2022-07-25 PROCEDURE — 86140 C-REACTIVE PROTEIN: CPT | Performed by: INTERNAL MEDICINE

## 2022-07-25 PROCEDURE — A9585 GADOBUTROL INJECTION: HCPCS | Performed by: INTERNAL MEDICINE

## 2022-07-25 PROCEDURE — 360N000075 HC SURGERY LEVEL 2, PER MIN: Performed by: INTERNAL MEDICINE

## 2022-07-25 PROCEDURE — 250N000013 HC RX MED GY IP 250 OP 250 PS 637: Performed by: PHYSICIAN ASSISTANT

## 2022-07-25 PROCEDURE — 272N000001 HC OR GENERAL SUPPLY STERILE: Performed by: INTERNAL MEDICINE

## 2022-07-25 PROCEDURE — 255N000002 HC RX 255 OP 636: Performed by: INTERNAL MEDICINE

## 2022-07-25 PROCEDURE — 258N000003 HC RX IP 258 OP 636: Performed by: INTERNAL MEDICINE

## 2022-07-25 PROCEDURE — 370N000017 HC ANESTHESIA TECHNICAL FEE, PER MIN: Performed by: INTERNAL MEDICINE

## 2022-07-25 PROCEDURE — 999N000141 HC STATISTIC PRE-PROCEDURE NURSING ASSESSMENT: Performed by: INTERNAL MEDICINE

## 2022-07-25 PROCEDURE — 80048 BASIC METABOLIC PNL TOTAL CA: CPT | Performed by: INTERNAL MEDICINE

## 2022-07-25 PROCEDURE — 250N000011 HC RX IP 250 OP 636: Performed by: NURSE ANESTHETIST, CERTIFIED REGISTERED

## 2022-07-25 PROCEDURE — 250N000013 HC RX MED GY IP 250 OP 250 PS 637: Performed by: INTERNAL MEDICINE

## 2022-07-25 PROCEDURE — 74183 MRI ABD W/O CNTR FLWD CNTR: CPT

## 2022-07-25 PROCEDURE — 85018 HEMOGLOBIN: CPT | Performed by: INTERNAL MEDICINE

## 2022-07-25 PROCEDURE — 99232 SBSQ HOSP IP/OBS MODERATE 35: CPT | Performed by: INTERNAL MEDICINE

## 2022-07-25 PROCEDURE — 88305 TISSUE EXAM BY PATHOLOGIST: CPT | Mod: 26 | Performed by: PATHOLOGY

## 2022-07-25 PROCEDURE — 120N000001 HC R&B MED SURG/OB

## 2022-07-25 RX ORDER — HYDROMORPHONE HCL IN WATER/PF 6 MG/30 ML
0.2 PATIENT CONTROLLED ANALGESIA SYRINGE INTRAVENOUS EVERY 5 MIN PRN
Status: DISCONTINUED | OUTPATIENT
Start: 2022-07-25 | End: 2022-07-25

## 2022-07-25 RX ORDER — LIDOCAINE 40 MG/G
CREAM TOPICAL
Status: DISCONTINUED | OUTPATIENT
Start: 2022-07-25 | End: 2022-07-25 | Stop reason: HOSPADM

## 2022-07-25 RX ORDER — PROPOFOL 10 MG/ML
INJECTION, EMULSION INTRAVENOUS CONTINUOUS PRN
Status: DISCONTINUED | OUTPATIENT
Start: 2022-07-25 | End: 2022-07-25

## 2022-07-25 RX ORDER — ONDANSETRON 2 MG/ML
INJECTION INTRAMUSCULAR; INTRAVENOUS PRN
Status: DISCONTINUED | OUTPATIENT
Start: 2022-07-25 | End: 2022-07-25

## 2022-07-25 RX ORDER — NALOXONE HYDROCHLORIDE 0.4 MG/ML
0.4 INJECTION, SOLUTION INTRAMUSCULAR; INTRAVENOUS; SUBCUTANEOUS
Status: DISCONTINUED | OUTPATIENT
Start: 2022-07-25 | End: 2022-07-26 | Stop reason: HOSPADM

## 2022-07-25 RX ORDER — FENTANYL CITRATE 50 UG/ML
25 INJECTION, SOLUTION INTRAMUSCULAR; INTRAVENOUS EVERY 5 MIN PRN
Status: DISCONTINUED | OUTPATIENT
Start: 2022-07-25 | End: 2022-07-25

## 2022-07-25 RX ORDER — ONDANSETRON 4 MG/1
4 TABLET, ORALLY DISINTEGRATING ORAL EVERY 30 MIN PRN
Status: DISCONTINUED | OUTPATIENT
Start: 2022-07-25 | End: 2022-07-25 | Stop reason: HOSPADM

## 2022-07-25 RX ORDER — FENTANYL CITRATE 50 UG/ML
25 INJECTION, SOLUTION INTRAMUSCULAR; INTRAVENOUS
Status: DISCONTINUED | OUTPATIENT
Start: 2022-07-25 | End: 2022-07-25

## 2022-07-25 RX ORDER — FLUMAZENIL 0.1 MG/ML
0.2 INJECTION, SOLUTION INTRAVENOUS
Status: ACTIVE | OUTPATIENT
Start: 2022-07-25 | End: 2022-07-26

## 2022-07-25 RX ORDER — GADOBUTROL 604.72 MG/ML
6.5 INJECTION INTRAVENOUS ONCE
Status: COMPLETED | OUTPATIENT
Start: 2022-07-25 | End: 2022-07-25

## 2022-07-25 RX ORDER — PROPOFOL 10 MG/ML
INJECTION, EMULSION INTRAVENOUS PRN
Status: DISCONTINUED | OUTPATIENT
Start: 2022-07-25 | End: 2022-07-25

## 2022-07-25 RX ORDER — MEPERIDINE HYDROCHLORIDE 25 MG/ML
12.5 INJECTION INTRAMUSCULAR; INTRAVENOUS; SUBCUTANEOUS
Status: DISCONTINUED | OUTPATIENT
Start: 2022-07-25 | End: 2022-07-25 | Stop reason: HOSPADM

## 2022-07-25 RX ORDER — DEXAMETHASONE SODIUM PHOSPHATE 4 MG/ML
INJECTION, SOLUTION INTRA-ARTICULAR; INTRALESIONAL; INTRAMUSCULAR; INTRAVENOUS; SOFT TISSUE PRN
Status: DISCONTINUED | OUTPATIENT
Start: 2022-07-25 | End: 2022-07-25

## 2022-07-25 RX ORDER — SODIUM CHLORIDE, SODIUM LACTATE, POTASSIUM CHLORIDE, CALCIUM CHLORIDE 600; 310; 30; 20 MG/100ML; MG/100ML; MG/100ML; MG/100ML
INJECTION, SOLUTION INTRAVENOUS CONTINUOUS
Status: DISCONTINUED | OUTPATIENT
Start: 2022-07-25 | End: 2022-07-25 | Stop reason: HOSPADM

## 2022-07-25 RX ORDER — OXYCODONE HYDROCHLORIDE 5 MG/1
5 TABLET ORAL EVERY 4 HOURS PRN
Status: DISCONTINUED | OUTPATIENT
Start: 2022-07-25 | End: 2022-07-25 | Stop reason: HOSPADM

## 2022-07-25 RX ORDER — NALOXONE HYDROCHLORIDE 0.4 MG/ML
0.2 INJECTION, SOLUTION INTRAMUSCULAR; INTRAVENOUS; SUBCUTANEOUS
Status: DISCONTINUED | OUTPATIENT
Start: 2022-07-25 | End: 2022-07-26 | Stop reason: HOSPADM

## 2022-07-25 RX ORDER — ONDANSETRON 2 MG/ML
4 INJECTION INTRAMUSCULAR; INTRAVENOUS EVERY 30 MIN PRN
Status: DISCONTINUED | OUTPATIENT
Start: 2022-07-25 | End: 2022-07-25 | Stop reason: HOSPADM

## 2022-07-25 RX ADMIN — SODIUM CHLORIDE: 9 INJECTION, SOLUTION INTRAVENOUS at 17:28

## 2022-07-25 RX ADMIN — PROPOFOL 150 MCG/KG/MIN: 10 INJECTION, EMULSION INTRAVENOUS at 10:40

## 2022-07-25 RX ADMIN — DEXAMETHASONE SODIUM PHOSPHATE 4 MG: 4 INJECTION, SOLUTION INTRA-ARTICULAR; INTRALESIONAL; INTRAMUSCULAR; INTRAVENOUS; SOFT TISSUE at 10:40

## 2022-07-25 RX ADMIN — MAGNESIUM CITRATE 296 ML: 0.06 SOLUTION ORAL at 06:09

## 2022-07-25 RX ADMIN — SODIUM CHLORIDE, POTASSIUM CHLORIDE, SODIUM LACTATE AND CALCIUM CHLORIDE: 600; 310; 30; 20 INJECTION, SOLUTION INTRAVENOUS at 09:53

## 2022-07-25 RX ADMIN — ONDANSETRON 4 MG: 2 INJECTION INTRAMUSCULAR; INTRAVENOUS at 10:40

## 2022-07-25 RX ADMIN — PROPOFOL 50 MG: 10 INJECTION, EMULSION INTRAVENOUS at 10:38

## 2022-07-25 RX ADMIN — GADOBUTROL 6.5 ML: 604.72 INJECTION INTRAVENOUS at 23:33

## 2022-07-25 RX ADMIN — PROPOFOL 30 MG: 10 INJECTION, EMULSION INTRAVENOUS at 10:40

## 2022-07-25 RX ADMIN — HYDROCODONE BITARTRATE AND ACETAMINOPHEN 1 TABLET: 5; 325 TABLET ORAL at 00:48

## 2022-07-25 RX ADMIN — ACETAMINOPHEN 650 MG: 325 TABLET ORAL at 19:38

## 2022-07-25 ASSESSMENT — ACTIVITIES OF DAILY LIVING (ADL)
ADLS_ACUITY_SCORE: 20

## 2022-07-25 NOTE — PLAN OF CARE
"Problem: Adjustment to Illness (Gastrointestinal Bleeding)  Goal: Optimal Coping with Acute Illness  Outcome: Ongoing, Progressing     Problem: Bleeding (Gastrointestinal Bleeding)  Goal: Hemostasis  Outcome: Ongoing, Progressing    Pt is A/O x 4, hx TBI has baseline rapid speech and rambling, per pt report. VSS. Pt continues NPO, reports nausea managed with PRN IV zofran, PRN IV compazine, tolerating and no emesis noted. Pt c/o abdominal discomfort, headache, managed with PRN tylenol. Pt drank approximately 38 oz gatorade bowel prep, but refused to drink remaining prep despite education, stated \"it's too nasty, I don't want to get sick.\" Pt reports \"3 times (having) diarrhea since starting the prep, saying \"it's working, I'm already going a lot.\"    Pt c/o pain at IV site, refusing IV fluids. not agreeing to have new IV placed, keeping IV in place as site is patent, saline locked. pt agrees with this plan, left note for MD updating. Hgb recheck was 10.    Plan is colonoscopy in AM 7/25.                        Goal Outcome Evaluation:                      "

## 2022-07-25 NOTE — PROGRESS NOTES
Pre-procedure Note    Reason for procedure: Bloody diarrhea, abnormal CT scan    History and Physical Reviewed: Reviewed, no changes.    Pre-sedation assessment:    General: alert, appears stated age and cooperative  Heart: RRR  Lungs: clear to auscultation bilaterally    Previous reaction to sedation: No    Sedation Plan based on assessment: MAC    Impression: Patient deemed adequate candidate for MAC sedation.    Plan: colonoscopy      Ilda Henderson MD  7/25/2022   [facility]

## 2022-07-25 NOTE — PLAN OF CARE
"  Problem: Bleeding (Gastrointestinal Bleeding)  Goal: Hemostasis  Outcome: Ongoing, Progressing   Goal Outcome Evaluation:      Provided education regarding bowel prep for colonoscopy but patient still declined to finish the remaining 30 oz of bowel prep. Stated that, \"I have gone enough\". Patient is independent in room and reported that she had three \"watery\" \"it was all blood\" stools. She was able to take the scheduled Magnesium citrate this morning. C/O headache and discomfort in her mouth. Pain controlled with PRN pain medications. IV saline locked. Call light within reach.                "
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
## 2022-07-25 NOTE — ANESTHESIA CARE TRANSFER NOTE
Patient: Nicki Castellanos    Procedure: Procedure(s):  COLONOSCOPY with biopsies       Diagnosis: Lower GI bleed [K92.2]  Bright red blood per rectum [K62.5]  Diagnosis Additional Information: No value filed.    Anesthesia Type:   MAC     Note:    Oropharynx: oropharynx clear of all foreign objects  Level of Consciousness: awake  Oxygen Supplementation: room air    Independent Airway: airway patency satisfactory and stable  Dentition: dentition unchanged  Vital Signs Stable: post-procedure vital signs reviewed and stable  Report to RN Given: handoff report given  Patient transferred to: Medical/Surgical Unit    Handoff Report: Identifed the Patient, Identified the Reponsible Provider, Reviewed the pertinent medical history, Discussed the surgical course, Reviewed Intra-OP anesthesia mangement and issues during anesthesia, Set expectations for post-procedure period and Allowed opportunity for questions and acknowledgement of understanding      Vitals:  Vitals Value Taken Time   /64 07/25/22 1119   Temp     Pulse 78 07/25/22 1119   Resp 16 07/25/22 1119   SpO2 97 % 07/25/22 1119       Electronically Signed By: MACY Bettencourt CRNA  July 25, 2022  11:19 AM

## 2022-07-25 NOTE — PROGRESS NOTES
"CLINICAL NUTRITION SERVICES - ASSESSMENT NOTE     Nutrition Prescription    RECOMMENDATIONS FOR MDs/PROVIDERS TO ORDER:  none    Malnutrition Status:    Pt does not meet 2 criteria    Recommendations already ordered by Registered Dietitian (RD):  none    Future/Additional Recommendations:  none     REASON FOR ASSESSMENT  Nicki Castellanos is a/an 42 year old female assessed by the dietitian for Admission Nutrition Risk Screen for wt loss, reduced po     Pt admitted with red blood per rectum, possible GI bleed, hx of TBI  Colonoscopy biopsies pending to r/o IBD    NUTRITION HISTORY  NKFA  She is mainly gluten and dairy free at home   She states she has been sick for approximately 4 months  She also states her stress level has been up too     CURRENT NUTRITION ORDERS  Diet: NPO      LABS  Labs reviewed, BUN-5, CR-.59    MEDICATIONS  Medications reviewed    ANTHROPOMETRICS  Height: 157.5 cm (5' 2\")  Most Recent Weight: 66.3 kg (146 lb 1.6 oz)    IBW: 50 kg  BMI: Overweight BMI 25-29.9  Weight History:   Wt Readings from Last 5 Encounters:   07/25/22 66.3 kg (146 lb 1.6 oz)   08/21/15 74.4 kg (164 lb)   06/10/14 65.3 kg (144 lb)       Dosing Weight: 50 kg    ASSESSED NUTRITION NEEDS  Estimated Energy Needs: 3675-6099 kcals/day (25 - 30 kcals/kg)  Justification: Maintenance  Estimated Protein Needs: 50-60 grams protein/day (1 - 1.2 grams of pro/kg)  Justification: Maintenance  Estimated Fluid Needs: 6136-6748 mL/day (25 - 30 mL/kg)   Justification: Maintenance    PHYSICAL FINDINGS  See malnutrition section below.  Edema-none noted   GI-last BM on 7/24  Skin-no breakdown noted     MALNUTRITION:  % Weight Loss:  None noted  % Intake:  <75% for >/= 3 months (moderate malnutrition)  Subcutaneous Fat Loss:  None observed  Muscle Loss:  None observed  Fluid Retention:  None noted    Malnutrition Diagnosis: Patient does not meet two of the above criteria necessary for diagnosing malnutrition      NUTRITION DIAGNOSIS  Inadequate " protein-energy intake related to impaired gi tract as evidenced by NPO status/bowel rest       INTERVENTIONS  Implementation  None at this time, will monitor diagnosis and any dietary guidance to go along with it     Goals  Patient to consume % of nutritionally adequate meals three times per day, or the equivalent with supplements/snacks.     Monitoring/Evaluation  Progress toward goals will be monitored and evaluated per protocol.

## 2022-07-25 NOTE — PROGRESS NOTES
"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  Colonoscopy with the patchy ulcerations.  Hemoglobin 6.8, transfused PRBCs, hemoglobin has now been stable.    Assessment & Plan      Rectal bleeding  Rectosigmoid ulceration  Differential: IBD, infection  Pathology pending, CRP ordered  Low fiber diet  Monitor hemoglobin  Severe anemia, iron studies were not done, does show microcytosis  Probably severe iron deficiency  Status post PRBC transfusion  Hold off on oral iron  Repeat CBC in a.m.  Menorrhagia  Follow-up with the GYN outpatient  Right renal lesion 8 mm in size  MR kidneys  Clinically Significant Risk Factors Present on Admission               # Overweight: Estimated body mass index is 26.72 kg/m  as calculated from the following:    Height as of this encounter: 1.575 m (5' 2\").    Weight as of this encounter: 66.3 kg (146 lb 1.6 oz).           Diet: No diet orders on file  DVT Prophylaxis: Low Risk Patient   [place] Catheter: Not present  Central Lines: None    Code Status: Full Code    Discharge Planning   Anticipated Discharge in :1 day  Expected Discharge Destination: Home   Milestones/Criteria For Discharge: Stable hemoglobin    Disposition Plan      Expected Discharge Date: 07/26/2022      Destination: home with family  Discharge Comments: Pending biopsy Results          The patient's care was discussed with the Bedside Nurse, Patient and Patient's Family.      Interval History/Subjective:     Reports feeling okay, denies any chest pain or shortness of breath abdominal pain or nausea or vomiting.  Feeling okay after colonoscopy    Physical Exam/Objective:     Vitals I/O   Vital signs:  Temp: 98.3  F (36.8  C) Temp src: Temporal BP: 118/64 Pulse: 78   Resp: 16 SpO2: 97 % O2 Device: None (Room air)   Height: 157.5 cm (5' 2\") " "Weight: 66.3 kg (146 lb 1.6 oz)  Estimated body mass index is 26.72 kg/m  as calculated from the following:    Height as of this encounter: 1.575 m (5' 2\").    Weight as of this encounter: 66.3 kg (146 lb 1.6 oz).       I/O last 3 completed shifts:  In: 1590 [IV Piggyback:1000]  Out: -      Vitals:    07/24/22 0758 07/25/22 0625   Weight: 68 kg (150 lb) 66.3 kg (146 lb 1.6 oz)      Weight change:    Body mass index is 26.72 kg/m .    General: Awake alert and comfortable  Lungs: CTA without rales or rhonchi  Heart: S1, S2 without murmurs  Abdomen: Soft nontender, bowel sounds positive  CNS: Nonfocal  Extremities: No edema      Medications:     Medications     sodium chloride Stopped (07/24/22 2208)       lidocaine (viscous)  5 mL Mouth/Throat Once     sodium chloride (PF)  3 mL Intracatheter Q8H       Data Reviewed   I personally reviewed all new medications, labs, imaging/diagnostics reports over the past 24 hours.     Pertinent findings include.     Labs    Recent Labs   Lab 07/25/22  0556 07/24/22  2152 07/24/22  0822   WBC  --   --  7.4   HGB 10.2* 10.0* 6.8*   MCV  --   --  71*   PLT  --   --  452*   INR  --   --  1.03     --  138   POTASSIUM 3.5  --  3.7   CHLORIDE 109*  --  106   CO2 20*  --  23   BUN 5*  --  11   CR 0.59*  --  0.71   ANIONGAP 9  --  9   MANUEL 8.7  --  9.0   GLC 83  --  86   ALBUMIN  --   --  3.3*   PROTTOTAL  --   --  6.9   BILITOTAL  --   --  0.2   ALKPHOS  --   --  78   ALT  --   --  10   AST  --   --  16       Imaging   No results found for this or any previous visit (from the past 24 hour(s)).      EKG:    Colonoscopy  Findings:        The visualized terminal ileum appeared normal.        There was moderate inflammation in the rectosigmoid colon extending to        approximately 30 cm from anal verge consisting or erythema, granularity,        friabilty, small ulcers and erosions with adherent mucopus, and loss of        the vascular pattern. There were two small patches of small " erosions and        small ulcers in cecum, two small patches of small erosions and small        ulcers in the proximal ascending colon, and two small patches of small        erosions and small ulcers in proximal transverse colon. The remainder of        colonic mucosa appeared normal. Biopsies taken from inflamed areas as        well as from normal appearing colon.       Jet Weber MD  Internal Medicine / [employer] [facility]  Securely message with the Vocera Web Console (learn more here)  Text page via [employer] ([employer])

## 2022-07-25 NOTE — ANESTHESIA POSTPROCEDURE EVALUATION
Patient: Nicki Castellanos    Procedure: Procedure(s):  COLONOSCOPY with biopsies       Anesthesia Type:  MAC    Note:     Postop Pain Control: Uneventful            Sign Out: Well controlled pain   PONV: No   Neuro/Psych: Uneventful            Sign Out: Acceptable/Baseline neuro status   Airway/Respiratory: Uneventful            Sign Out: Acceptable/Baseline resp. status   CV/Hemodynamics: Uneventful            Sign Out: Acceptable CV status; No obvious hypovolemia; No obvious fluid overload   Other NRE: NONE   DID A NON-ROUTINE EVENT OCCUR? No           Last vitals:  Vitals:    07/25/22 0745 07/25/22 0947 07/25/22 1119   BP: 108/65 130/81 118/64   Pulse: 82 91 78   Resp: 16 18 16   Temp: 36.7  C (98  F) 36.8  C (98.3  F)    SpO2: 100% 100% 97%       Electronically Signed By: Kristopher Rasheed MD  July 25, 2022  12:11 PM

## 2022-07-26 VITALS
OXYGEN SATURATION: 100 % | RESPIRATION RATE: 20 BRPM | HEIGHT: 62 IN | HEART RATE: 91 BPM | DIASTOLIC BLOOD PRESSURE: 72 MMHG | BODY MASS INDEX: 27.02 KG/M2 | TEMPERATURE: 97.8 F | SYSTOLIC BLOOD PRESSURE: 130 MMHG | WEIGHT: 146.8 LBS

## 2022-07-26 LAB
ANION GAP SERPL CALCULATED.3IONS-SCNC: 11 MMOL/L (ref 5–18)
BUN SERPL-MCNC: 9 MG/DL (ref 8–22)
C REACTIVE PROTEIN LHE: 5.8 MG/DL (ref 0–?)
CALCIUM SERPL-MCNC: 9.3 MG/DL (ref 8.5–10.5)
CHLORIDE BLD-SCNC: 104 MMOL/L (ref 98–107)
CO2 SERPL-SCNC: 23 MMOL/L (ref 22–31)
CREAT SERPL-MCNC: 0.76 MG/DL (ref 0.6–1.1)
GFR SERPL CREATININE-BSD FRML MDRD: >90 ML/MIN/1.73M2
GLUCOSE BLD-MCNC: 112 MG/DL (ref 70–125)
GLUCOSE BLDC GLUCOMTR-MCNC: 82 MG/DL (ref 70–99)
HGB BLD-MCNC: 10.5 G/DL (ref 11.7–15.7)
PATH REPORT.COMMENTS IMP SPEC: NORMAL
PATH REPORT.FINAL DX SPEC: NORMAL
PATH REPORT.GROSS SPEC: NORMAL
PATH REPORT.MICROSCOPIC SPEC OTHER STN: NORMAL
PATH REPORT.RELEVANT HX SPEC: NORMAL
PHOTO IMAGE: NORMAL
POTASSIUM BLD-SCNC: 3.6 MMOL/L (ref 3.5–5)
SODIUM SERPL-SCNC: 138 MMOL/L (ref 136–145)

## 2022-07-26 PROCEDURE — 99239 HOSP IP/OBS DSCHRG MGMT >30: CPT | Performed by: INTERNAL MEDICINE

## 2022-07-26 PROCEDURE — 85018 HEMOGLOBIN: CPT | Performed by: INTERNAL MEDICINE

## 2022-07-26 PROCEDURE — 82310 ASSAY OF CALCIUM: CPT | Performed by: INTERNAL MEDICINE

## 2022-07-26 PROCEDURE — 86140 C-REACTIVE PROTEIN: CPT | Performed by: INTERNAL MEDICINE

## 2022-07-26 PROCEDURE — 82947 ASSAY GLUCOSE BLOOD QUANT: CPT | Performed by: INTERNAL MEDICINE

## 2022-07-26 PROCEDURE — 36415 COLL VENOUS BLD VENIPUNCTURE: CPT | Performed by: INTERNAL MEDICINE

## 2022-07-26 ASSESSMENT — ACTIVITIES OF DAILY LIVING (ADL)
ADLS_ACUITY_SCORE: 20

## 2022-07-26 NOTE — PLAN OF CARE
Problem: Bleeding (Gastrointestinal Bleeding)  Goal: Hemostasis  Outcome: Ongoing, Progressing     Problem: Oral Intake Inadequate  Goal: Improved Oral Intake  Outcome: Ongoing, Progressing   Pt. Tolerated regular diet this morning. Continues to report bloody stools, GI following up with pt. And pathologies are still pending. Pt. Has been cleared for discharge by both GI and internal medicine.    Discharge AVS reviewed with patient. Teachings acknowledged and belongings sent with via [facility] transport.      Funmilayo Vann RN on 7/26/2022 at 10:54 AM

## 2022-07-26 NOTE — PROGRESS NOTES
Care Management Discharge Note    Discharge Date: 07/26/2022       Discharge Disposition: Home    Discharge Services: home    Discharge DME: None    Discharge Transportation: family or friend will provide    Education Provided on the Discharge Plan:  AVS per bedside RN.    Persons Notified of Discharge Plans: patient    Patient/Family in Agreement with the Plan: yes        Additional Information:  Chart reviewed. Patient discharge per bedside RN.         Kassidy Bingham RN

## 2022-07-26 NOTE — PROGRESS NOTES
GI PROGRESS NOTE  7/26/2022  Nicki Castellanos  1980  /-77    Subjective:  She continues to have frequent episodes of rectal bleeding.  There is some minor abdominal cramping in the lower abdomen but no severe pain.  She was quite upset this morning when unable to eat but happier now that she had a low fiber breakfast.  She strongly desires discharge today.  There is no family history of ulcerative colitis or Crohn's disease.  She was using ibuprofen a couple of days per week for headaches.  Rectal bleeding was present for 4 months.  Also with heavy menses chronically.     Objective:    Patient Vitals for the past 24 hrs:   BP Temp Temp src Pulse Resp SpO2 Weight   07/26/22 0700 130/72 97.8  F (36.6  C) Oral 91 20 100 % --   07/26/22 0614 -- -- -- -- -- -- 66.6 kg (146 lb 12.8 oz)   07/26/22 0000 119/78 97.6  F (36.4  C) Oral 86 16 98 % --   07/25/22 1700 -- -- -- 92 -- -- --   07/25/22 1551 118/70 98  F (36.7  C) Oral 81 18 96 % --   07/25/22 1119 118/64 -- -- 78 16 97 % --     Body mass index is 26.85 kg/m .  Gen: NAD  Cardio: RRR  GI: Non-distended, BS positive, soft, non-tender    Laboratory  Recent Labs   Lab Test 07/25/22  2205 07/25/22  1355 07/25/22  0556 07/24/22  2152 07/24/22  0822 02/14/19  1233 02/14/19  1233 02/14/19  1233   WBC  --   --   --   --  7.4  --  5.6  --    HGB 9.5* 11.3* 10.2*   < > 6.8*   < > 11.7*  --    MCV  --   --   --   --  71*  --  84  --    PLT  --   --   --   --  452*  --  216  --    INR  --   --   --   --  1.03  --   --  1.08    < > = values in this interval not displayed.     Recent Labs   Lab Test 07/26/22  0839 07/25/22  1704 07/25/22  1444 07/25/22  0556 07/24/22  0822 02/14/19  1233   NA  --   --   --  138 138 139   POTASSIUM  --   --   --  3.5 3.7 3.4*   CHLORIDE  --   --   --  109* 106 106   CO2  --   --   --  20* 23 21*   BUN  --   --   --  5* 11 12   CR  --   --   --  0.59* 0.71 0.61   ANIONGAP  --   --   --  9 9 12   MANUEL  --   --   --  8.7 9.0 8.6   GLC 82 105*  162* 83 86 94     Recent Labs   Lab Test 07/24/22  0822 02/14/19  1233 02/14/19  1233   ALBUMIN 3.3*  --  3.5   BILITOTAL 0.2  --  0.3   ALT 10  --  19   AST 16  --  18   ALKPHOS 78  --  75   PROTEIN  --  100 mg/dL*  --    LIPASE  --   --  30       CTA Abdomen Pelvis with Contrast    Result Date: 7/24/2022  EXAM: CTA ABDOMEN PELVIS WITH CONTRAST LOCATION: [facility] DATE/TIME: 7/24/2022 9:21 AM INDICATION: Lower GI bleeding, anemia, abdominal pain COMPARISON: 2/14/2019 TECHNIQUE: CT angiogram abdomen pelvis during arterial phase of injection of IV contrast. 2D and 3D MIP reconstructions were performed by the CT technologist. Dose reduction techniques were used. CONTRAST: 100 mL Isovue-370 IV FINDINGS: ANGIOGRAM ABDOMEN/PELVIS: Normal aorta. Patent celiac, SMA, CORNELIA, and single bilateral renal arteries. Patent common, internal and external iliac arteries. LOWER CHEST: Stable scarring in the right lower lobe. HEPATOBILIARY: Normal. PANCREAS: Normal. SPLEEN: Normal. ADRENAL GLANDS: Normal. KIDNEYS/BLADDER: There is a 3 mm nonobstructing stone in the upper pole of the right kidney. There is an 8 mm low-attenuation lesion in the lateral midpole of the right kidney on image 63 series 5 which is not seen on the prior study. It measures higher than water density at 87 Hounsfield units and is therefore indeterminate. Stable small low-attenuation lesion in the midpole of the left kidney most compatible with a cyst which does not require specific follow-up. No hydronephrosis. Normal ureters and bladder. BOWEL: Normal stomach, small bowel, and appendix. There is wall thickening of the distal sigmoid colon and rectum which is new from the prior study. There is no extravasation of contrast to suggest active bleeding. Findings are most compatible with colitis. However, there is considerable adjacent adenopathy. Therefore, underlying malignancy cannot be completely excluded. Correlation with colonoscopy is  recommended. LYMPH NODES: There are multiple enlarged mesorectal lymph nodes, for example measuring 1.5 x 1 cm on image 157 series 5 and measuring 1.8 x 1.1 cm on image 159 series 5. Small subcentimeter retroperitoneal, common and external iliac lymph nodes are seen as well. PELVIC ORGANS: Uterus and adnexa are unremarkable. No free fluid is seen in the pelvis. MUSCULOSKELETAL: Degenerative change in the spine most severe at L5-S1.     IMPRESSION: 1.  Wall thickening of the distal sigmoid colon and rectum without active bleeding. While findings would be compatible with colitis, there is considerable adjacent adenopathy raising concern for underlying malignancy. Correlation with colonoscopy is recommended. 2.  Nonobstructing stone in the right kidney. 3.  Indeterminate lesion in the midpole of the right kidney. Dedicated MRI is recommended for further assessment. NOTE: ABNORMAL REPORT 1.  THE DICTATION ABOVE DESCRIBES AN ABNORMALITY FOR WHICH FOLLOW-UP IS NEEDED.     MR Renal wo & w Contrast    Result Date: 7/26/2022  EXAM: MR RENAL W/O and W CONTRAST LOCATION: [facility] DATE/TIME: 7/25/2022 11:14 PM INDICATION: Indeterminate 8 mm low-attenuation lesion mid to upper right kidney at 7/24/2022 CTA abdomen and pelvis, as well as a 5 x 4 x 3 mm right renal stone, a 3 mm left renal stone and rectosigmoid mural thickening and mesenteric lymph node enlargement which was performed for 3-month history of bloody diarrhea and abdominal pain. COMPARISON: CTA abdomen and pelvis 7/24/2022, CT abdomen and pelvis 2/14/2019. TECHNIQUE: Routine MRI renal protocol including T1 in/out phase, diffusion, multiplane T2, and dynamic T1 with IV contrast. CONTRAST: 6.5 mL Gadavist FINDINGS: RIGHT KIDNEY: The right upper pole renal stone visible 7/24/2022 CTA is located in the upper pole infundibulum, measures 5 x 4 x 3 mm and appears to be causing mild isolated upper pole caliectasis. Corresponding to the finding at  7/24/2022 CTA is a peripheral wedge-shaped 10 x 10 x 6 mm lesion in lateral cortex right upper pole parenchyma that has increased T2 signal, T1 hypointense and minimal peripheral enhancement. LEFT KIDNEY: Benign 6 mm hemorrhagic cyst lower pole left kidney requires no follow-up. Left kidney otherwise normal. ADDITIONAL FINDINGS: No significant abnormality in the gallbladder, liver, spleen, pancreas, and adrenal glands. No lymphadenopathy. No ascites.     IMPRESSION: 1.  The right upper pole renal stone visible 7/24/2022 CTA is located in the upper pole infundibulum, measures 5 x 4 x 3 mm and appears to be causing mild isolated upper pole caliectasis. 2.  Corresponding to the finding at 7/24/2022 CTA is a small peripheral wedge-shaped lesion in lateral cortex right upper pole parenchyma most likely relates to the isolated pyelocaliectasis as described above and is less likely a small cystic neoplasm. Consider nonurgent urology consultation and 12-month follow-up MRI.    7/2/2022 Colonoscopy  Findings:        The visualized terminal ileum appeared normal.        There was moderate inflammation in the rectosigmoid colon extending to        approximately 30 cm from anal verge consisting or erythema, granularity,        friabilty, small ulcers and erosions with adherent mucopus, and loss of        the vascular pattern. There were two small patches of small erosions and        small ulcers in cecum, two small patches of small erosions and small        ulcers in the proximal ascending colon, and two small patches of small        erosions and small ulcers in proximal transverse colon. The remainder of        colonic mucosa appeared normal. Biopsies taken from inflamed areas as        well as from normal appearing colon.                                                                                     Moderate Sedation:        MAC sedation.   Impression:            - Colitis.   Recommendation:        - Await pathology  results.                          - Avoid NSAIDs.     Colonoscopy biopsy pending    Assessment:  Rectal bleeding with anemia, present for 4 months.  Colonoscopy findings concerning for inflammatory bowel disease, UC versus Crohn's with pending biopsy.  She is not currently on any treatment.  Rectal bleeding continues but she strongly desires discharge today.  This was discussed with Dr. Henderson.  If biopsies do confirm IBD, she would likely be started on oral prednisone initially and further maintenance medication could be discussed in clinic.  I did briefly discuss medication such as mesalamine today, but if biopsies are concerning for Crohn's she may warrant more advanced therapies such as a biologic.  I stressed the importance of clinic follow-up for further discussion and she seemed to understand.    Patient Active Problem List   Diagnosis     Headache     Benign paroxysmal positional vertigo     Syncope     Anxiety state     Cervical high risk HPV (human papillomavirus) test positive     Insomnia, unspecified     Moderate dysplasia of cervix     Hx of traumatic brain injury     Bright red blood per rectum     Anemia due to blood loss, acute     Lower GI bleed     ADHD (attention deficit hyperactivity disorder)     Mood disorder (H)     Menorrhagia with regular cycle     History of alcohol abuse        Plan:    1.  Continue low residue diet.  2.  Follow-up on pending colonoscopy biopsy, and we will contact her with results when available.  3.  We will arrange for close GI clinic follow-up to discuss further including maintenance medications if indicated.  4.  Avoid NSAIDs which could worsen IBD.  She could use Tylenol if needed.  5.  Okay to discharge today from a GI standpoint.    Jeanne Musa PA-C  [facility]  [phone]

## 2022-07-26 NOTE — PLAN OF CARE
"  Problem: Plan of Care - These are the overarching goals to be used throughout the patient stay.    Goal: Optimal Comfort and Wellbeing  Outcome: Ongoing, Progressing   Goal Outcome Evaluation:  Patient is alert and oriented x 4. Was agitated and didn't want anyone in her room. \"Leave me alone, I have not been getting enough sleep and you people keep waking me up\". Cares were clustered but patient later stated that she felt \"neglected\". She's independent in her room. Denied N/V. Tolerated clear liquids. IV saline locked. Discharged plan pending medical clearance.                      "

## 2022-07-26 NOTE — DISCHARGE SUMMARY
[facility] MEDICINE  DISCHARGE SUMMARY     Primary Care Physician: No Ref-Primary, Physician  Admission Date: 7/24/2022   Discharge Provider: Jet Weber MD Discharge Date: 7/26/2022   Diet:   Active Diet and Nourishment Order   Procedures     Regular Diet Adult     Diet       Code Status: Full Code   Activity: DCACTIVITY: Activity as tolerated        Condition at Discharge: Stable     REASON FOR PRESENTATION(See Admission Note for Details)   Blood in the stools,    PRINCIPAL & ACTIVE DISCHARGE DIAGNOSES   Rectosigmoid inflammation, ulceration, colitis  Anemia secondary to acute blood loss and probable iron deficiency  Oral 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 fibromas  Asymptomatic renal stones  Right upper pole small lesion probably pyelocaliectasis, requires 12-month follow-up  Principal Problem:    Bright red blood per rectum  Active Problems:    Anxiety state    Hx of traumatic brain injury    Anemia due to blood loss, acute    Lower GI bleed    ADHD (attention deficit hyperactivity disorder)    Mood disorder (H)    Menorrhagia with regular cycle      PENDING LABS     Unresulted Labs Ordered in the Past 30 Days of this Admission     Date and Time Order Name Status Description    7/25/2022 10:48 AM Surgical Pathology Exam In process     7/24/2022  8:07 AM Blood Culture Line, venous Preliminary             PROCEDURES ( this hospitalization only)      Procedure(s):  COLONOSCOPY with biopsies    RECOMMENDATIONS TO OUTPATIENT PROVIDER FOR F/U VISIT     Follow-up Appointments     Follow-up and recommended labs and tests       Follow up with primary care provider, Physician No Ref-Primary, within 7   days for hospital follow- up and to follow up on results.  The following   labs/tests are recommended: hemoglobin     [facility] for follow up on lab results and treatment plan     Oral surgeon to assess the lesions in mouth. These are probable non   cancerous fibromas but need to be assessed                  DISPOSITION     Home    SUMMARY OF HOSPITAL COURSE:      42-year-old female presented with rectal bleeding, microcytic anemia hemoglobin 6.8, transfused 2 unit PRBCs, hemoglobin improved to 10.  Seen by gastroenterology, underwent colonoscopy, shows rectosigmoid inflammation and ulceration and other areas of inflammation in the colon and cecum, biopsies taken from the inflamed areas and normal colon and are pending at discharge.  Patient reported some oral lesions present for a long time, usually nonpainful 1 is lower lip in the middle, another left buccal mucosa, is a pedunculated, hard, nontender, whitish in appearance, likely represent fibroma, patient advised to follow-up with oral surgeon for further evaluation given the lesion on the lip has become painful recently.    Reported menorrhagia, advised to follow-up with the gynecologist on outpatient    Discharge Medications with Med changes:     There are no discharge medications for this patient.    Treatment of colitis after biopsy results have returned, GI will follow up        Rationale for medication changes:              Consults     GI  CARE MANAGEMENT / SOCIAL WORK IP CONSULT    Immunizations given this encounter     Most Recent Immunizations   Administered Date(s) Administered     COVID-19,PF,Moderna 02/18/2021           Anticoagulation Information      Recent INR results:   Recent Labs   Lab 07/24/22  0822   INR 1.03     Warfarin doses (if applicable) or name of other anticoagulant:       SIGNIFICANT IMAGING FINDINGS     Results for orders placed or performed during the hospital encounter of 07/24/22   CTA Abdomen Pelvis with Contrast    Impression    IMPRESSION:  1.  Wall thickening of the distal sigmoid colon and rectum without active bleeding. While findings would be compatible with colitis, there is considerable adjacent adenopathy raising concern for underlying malignancy. Correlation with colonoscopy is   recommended.  2.   Nonobstructing stone in the right kidney.  3.  Indeterminate lesion in the midpole of the right kidney. Dedicated MRI is recommended for further assessment.    NOTE: ABNORMAL REPORT    1.  THE DICTATION ABOVE DESCRIBES AN ABNORMALITY FOR WHICH FOLLOW-UP IS NEEDED.    MR Renal wo & w Contrast    Impression    IMPRESSION:  1.  The right upper pole renal stone visible 7/24/2022 CTA is located in the upper pole infundibulum, measures 5 x 4 x 3 mm and appears to be causing mild isolated upper pole caliectasis.   2.  Corresponding to the finding at 7/24/2022 CTA is a small peripheral wedge-shaped lesion in lateral cortex right upper pole parenchyma most likely relates to the isolated pyelocaliectasis as described above and is less likely a small cystic neoplasm.   Consider nonurgent urology consultation and 12-month follow-up MRI.       SIGNIFICANT LABORATORY FINDINGS     Most Recent 3 CBC's:Recent Labs   Lab Test 07/25/22  2205 07/25/22  1355 07/25/22  0556 07/24/22  2152 07/24/22  0822 02/14/19  1233   WBC  --   --   --   --  7.4 5.6   HGB 9.5* 11.3* 10.2*   < > 6.8* 11.7*   MCV  --   --   --   --  71* 84   PLT  --   --   --   --  452* 216    < > = values in this interval not displayed.     Most Recent 3 BMP's:Recent Labs   Lab Test 07/26/22  0839 07/25/22  1704 07/25/22  1444 07/25/22  0556 07/24/22  0822 02/14/19  1233   NA  --   --   --  138 138 139   POTASSIUM  --   --   --  3.5 3.7 3.4*   CHLORIDE  --   --   --  109* 106 106   CO2  --   --   --  20* 23 21*   BUN  --   --   --  5* 11 12   CR  --   --   --  0.59* 0.71 0.61   ANIONGAP  --   --   --  9 9 12   MANUEL  --   --   --  8.7 9.0 8.6   GLC 82 105* 162* 83 86 94     Most Recent 2 LFT's:Recent Labs   Lab Test 07/24/22  0822 02/14/19  1233   AST 16 18   ALT 10 19   ALKPHOS 78 75   BILITOTAL 0.2 0.3           Discharge Orders        Reason for your hospital stay    Rectal bleeding, colonoscopy showed rectosigmoid inflammation and some ulcers in colon     Follow-up  and recommended labs and tests     Follow up with primary care provider, Physician No Ref-Primary, within 7 days for hospital follow- up and to follow up on results.  The following labs/tests are recommended: hemoglobin     [facility] for follow up on lab results and treatment plan     Oral surgeon to assess the lesions in mouth. These are probable non cancerous fibromas but need to be assessed     Activity    Your activity upon discharge: activity as tolerated     Diet    Follow this diet upon discharge: Orders Placed This Encounter      Regular Diet Adult       Examination   Physical Exam   Temp:  [97.6  F (36.4  C)-98.3  F (36.8  C)] 97.8  F (36.6  C)  Pulse:  [78-92] 91  Resp:  [16-20] 20  BP: (118-130)/(64-81) 130/72  SpO2:  [96 %-100 %] 100 %  Wt Readings from Last 1 Encounters:   07/26/22 66.6 kg (146 lb 12.8 oz)       General: Awake alert and comfortable  Oral cavity: Small pedunculated lesion left buccal mucosa and lower lip in the midline, whitish, nontender, no ulceration noted  Lungs: CTA without rales or rhonchi  Heart: S1, S2 without murmurs  Abdomen: Soft nontender, bowel sounds positive  CNS: Nonfocal  Extremities: No edema        Please see EMR for more detailed significant labs, imaging, consultant notes etc.    IJet MD, personally saw the patient today and spent greater than 30 minutes discharging this patient.    Jet Weber MD  [facility]    CC:No Ref-Primary, Physician

## 2022-07-27 ENCOUNTER — PATIENT OUTREACH (OUTPATIENT)
Dept: CARE COORDINATION | Facility: CLINIC | Age: 42
End: 2022-07-27

## 2022-07-27 DIAGNOSIS — Z71.89 OTHER SPECIFIED COUNSELING: ICD-10-CM

## 2022-07-27 NOTE — PROGRESS NOTES
Clinic Care Coordination Contact  [facility]/Voicemail       Clinical Data: Care Coordinator Outreach  Outreach attempted x 1.  Left message on patient's voicemail with call back information and requested return call.  Care Coordinator will try to reach patient again in 1-2 business days.      Edna Mendoza  [phone]  Care

## 2022-07-28 ENCOUNTER — PATIENT OUTREACH (OUTPATIENT)
Dept: CARE COORDINATION | Facility: CLINIC | Age: 42
End: 2022-07-28

## 2022-07-28 NOTE — PROGRESS NOTES
"Clinic Care Coordination Contact  [facility]: Post-Discharge Note  SITUATION                                                      Admission:    Admission Date: 07/24/22   Reason for Admission: Rectosigmoid inflammation, ulceration, colitis,  Anemia secondary to acute blood loss and probable iron deficiency  Discharge:   Discharge Date: 07/26/22  Discharge Diagnosis: Rectosigmoid inflammation, ulceration, colitis,  Anemia secondary to acute blood loss and probable iron deficiency    BACKGROUND                                                      Per hospital discharge summary and inpatient provider notes:    Nicki Castellanos is a 42 year old old female with history of remote I am 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 ; heavy menstrual periods over the past year ; otherwise high functioning and healthy who presented with bright red blood per rectum intermittently over the past several weeks her hemoglobin was profoundly low at 6.8  She is being admitted for further evaluation and work-up including gastrointestinal evaluation; if that is unremarkable she will then need to see GYN for her heavy menstrual periods     ASSESSMENT      Enrollment  Primary Care Care Coordination Status: Not a Candidate    Discharge Assessment  How are you doing now that you are home?: \"Still not recovering\"  How are your symptoms? (Red Flag symptoms escalate to triage hotline per guidelines): Unchanged  Do you feel your condition is stable enough to be safe at home until your provider visit?: Yes  Does the patient have their discharge instructions? : Yes  Does the patient have questions regarding their discharge instructions? : No  Were you started on any new medications or were there changes to any of your previous medications? : Yes  Does the patient have all of their medications?: Yes  Do you have questions regarding any of your medications? : No  Do you have all of your needed medical supplies or " equipment (DME)?  (i.e. oxygen tank, CPAP, cane, etc.): Yes  Discharge follow-up appointment scheduled within 14 calendar days? : No  Is patient agreeable to assistance with scheduling? : No    Post-op (MIKHAIL CTA Only)  If the patient had a surgery or procedure, do they have any questions for a nurse?: No    PLAN                                                      Outpatient Plan:    Follow-up and recommended labs and tests      Follow up with primary care provider, Physician No Ref-Primary, within 7   days for hospital follow- up and to follow up on results.  The following   labs/tests are recommended: hemoglobin      [facility] for follow up on lab results and treatment plan      Oral surgeon to assess the lesions in mouth. These are probable non   cancerous fibromas but need to be assessed     No future appointments.      For any urgent concerns, please contact our 24 hour nurse triage line: [phone] ([phone])         MIKHAIL Chaudhry  [phone]  [facility]

## 2022-07-29 LAB — BACTERIA BLD CULT: NO GROWTH

## 2022-10-10 ENCOUNTER — OFFICE VISIT (OUTPATIENT)
Dept: FAMILY MEDICINE | Facility: CLINIC | Age: 42
End: 2022-10-10
Payer: COMMERCIAL

## 2022-10-10 VITALS
HEIGHT: 62 IN | WEIGHT: 143.3 LBS | DIASTOLIC BLOOD PRESSURE: 60 MMHG | SYSTOLIC BLOOD PRESSURE: 112 MMHG | OXYGEN SATURATION: 99 % | TEMPERATURE: 98 F | HEART RATE: 107 BPM | BODY MASS INDEX: 26.37 KG/M2

## 2022-10-10 DIAGNOSIS — J03.90 ACUTE TONSILLITIS, UNSPECIFIED ETIOLOGY: Primary | ICD-10-CM

## 2022-10-10 PROBLEM — K51.00 ULCERATIVE PANCOLITIS WITHOUT COMPLICATION (H): Status: ACTIVE | Noted: 2022-10-10

## 2022-10-10 LAB
DEPRECATED S PYO AG THROAT QL EIA: NEGATIVE
FLUAV AG SPEC QL IA: NEGATIVE
FLUBV AG SPEC QL IA: NEGATIVE
GROUP A STREP BY PCR: NOT DETECTED

## 2022-10-10 PROCEDURE — 99203 OFFICE O/P NEW LOW 30 MIN: CPT | Mod: CS | Performed by: FAMILY MEDICINE

## 2022-10-10 PROCEDURE — U0005 INFEC AGEN DETEC AMPLI PROBE: HCPCS | Performed by: FAMILY MEDICINE

## 2022-10-10 PROCEDURE — 87804 INFLUENZA ASSAY W/OPTIC: CPT | Performed by: FAMILY MEDICINE

## 2022-10-10 PROCEDURE — U0003 INFECTIOUS AGENT DETECTION BY NUCLEIC ACID (DNA OR RNA); SEVERE ACUTE RESPIRATORY SYNDROME CORONAVIRUS 2 (SARS-COV-2) (CORONAVIRUS DISEASE [COVID-19]), AMPLIFIED PROBE TECHNIQUE, MAKING USE OF HIGH THROUGHPUT TECHNOLOGIES AS DESCRIBED BY CMS-2020-01-R: HCPCS | Performed by: FAMILY MEDICINE

## 2022-10-10 PROCEDURE — 87651 STREP A DNA AMP PROBE: CPT | Performed by: FAMILY MEDICINE

## 2022-10-10 RX ORDER — MESALAMINE 1.2 G/1
TABLET, DELAYED RELEASE ORAL
COMMUNITY
Start: 2022-09-28 | End: 2023-10-09

## 2022-10-10 ASSESSMENT — ENCOUNTER SYMPTOMS
COUGH: 1
SORE THROAT: 1
FEVER: 1

## 2022-10-10 ASSESSMENT — PAIN SCALES - GENERAL: PAINLEVEL: WORST PAIN (10)

## 2022-10-10 NOTE — PROGRESS NOTES
Assessment & Plan     Acute tonsillitis, unspecified etiology  41 yo female with pharyngitis and body aches.  Suspect influenza vs covid.  Less likely strept however patient requests testing.  Recommending continuing with tylenol, symptomatic cares.  Further recommendations pending results  - Streptococcus A Rapid Screen w/Reflex to PCR - Clinic Collect  - Influenza A & B Antigen - Clinic Collect  - Symptomatic; Yes; 10/5/2022 COVID-19 Virus (Coronavirus) by PCR      Ayanna Roman MD  [facility] FADI Caceres is a 42 year old, presenting for the following health issues:  Pharyngitis (X Wed), Fever, Generalized Body Aches, and Cough (X Friday)      Symptoms started last Wednesday.  Feeling very sore throat now.  Had cough, generalized body aches and ear pain.  Taking tylenol- did not help. No eating well, drinking.  No exposures. 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      Pharyngitis   Associated symptoms include cough.   Fever  Associated symptoms include coughing, a fever and a sore throat.   Cough  Associated symptoms include sore throat.   History of Present Illness       Reason for visit:  Sore throat  Symptom onset:  1-3 days ago  Symptom intensity:  Moderate  Symptom progression:  Worsening  Had these symptoms before:  Yes  Has tried/received treatment for these symptoms:  Yes  Previous treatment was successful:  Yes    She eats 2-3 servings of fruits and vegetables daily.She consumes 1 sweetened beverage(s) daily.She exercises with enough effort to increase her heart rate 10 to 19 minutes per day.  She exercises with enough effort to increase her heart rate 3 or less days per week.   She is taking medications regularly.           Review of Systems   Constitutional: Positive for fever.   HENT: Positive for sore throat.    Respiratory: Positive for cough.             Objective    /60 (BP Location: Left arm, Patient Position: Sitting, Cuff Size: Adult  "Regular)   Pulse 107   Temp 98  F (36.7  C) (Temporal)   Ht 1.58 m (5' 2.21\")   Wt 65 kg (143 lb 4.8 oz)   SpO2 99%   BMI 26.04 kg/m    Body mass index is 26.04 kg/m .  Physical Exam   GENERAL: healthy, alert and no distress  HENT: ear canals and TM's normal, nose and mouth without ulcers or lesions  NECK: no adenopathy, no asymmetry, masses, or scars and thyroid normal to palpation  RESP: lungs clear to auscultation - no rales, rhonchi or wheezes  CV: regular rate and rhythm, normal S1 S2, no S3 or S4, no murmur, click or rub, no peripheral edema and peripheral pulses strong  ABDOMEN: soft, nontender, no hepatosplenomegaly, no masses and bowel sounds normal  MS: no gross musculoskeletal defects noted, no edema    No results found for this or any previous visit (from the past 24 hour(s)).                "

## 2022-10-11 LAB — SARS-COV-2 RNA RESP QL NAA+PROBE: NEGATIVE

## 2022-11-21 ENCOUNTER — HEALTH MAINTENANCE LETTER (OUTPATIENT)
Age: 42
End: 2022-11-21

## 2022-11-24 ENCOUNTER — NURSE TRIAGE (OUTPATIENT)
Dept: NURSING | Facility: CLINIC | Age: 42
End: 2022-11-24
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
## 2023-01-04 ENCOUNTER — APPOINTMENT (OUTPATIENT)
Dept: CT IMAGING | Facility: CLINIC | Age: 43
DRG: 392 | End: 2023-01-04
Attending: STUDENT IN AN ORGANIZED HEALTH CARE EDUCATION/TRAINING PROGRAM
Payer: COMMERCIAL

## 2023-01-04 ENCOUNTER — HOSPITAL ENCOUNTER (INPATIENT)
Facility: CLINIC | Age: 43
LOS: 1 days | Discharge: LEFT BEFORE DISCHARGE | DRG: 392 | End: 2023-01-05
Attending: STUDENT IN AN ORGANIZED HEALTH CARE EDUCATION/TRAINING PROGRAM | Admitting: FAMILY MEDICINE
Payer: COMMERCIAL

## 2023-01-04 DIAGNOSIS — K62.5 RECTAL BLEEDING: ICD-10-CM

## 2023-01-04 PROBLEM — D64.9 ANEMIA: Status: ACTIVE | Noted: 2023-01-04

## 2023-01-04 LAB
ABO/RH(D): ABNORMAL
ALBUMIN SERPL-MCNC: 3.6 G/DL (ref 3.5–5)
ALP SERPL-CCNC: 88 U/L (ref 45–120)
ALT SERPL W P-5'-P-CCNC: 14 U/L (ref 0–45)
ANION GAP SERPL CALCULATED.3IONS-SCNC: 15 MMOL/L (ref 5–18)
ANTIBODY ID: NORMAL
ANTIBODY SCREEN: POSITIVE
AST SERPL W P-5'-P-CCNC: 23 U/L (ref 0–40)
BASOPHILS # BLD AUTO: 0.1 10E3/UL (ref 0–0.2)
BASOPHILS NFR BLD AUTO: 1 %
BILIRUB SERPL-MCNC: 0.2 MG/DL (ref 0–1)
BLD PROD TYP BPU: NORMAL
BLD PROD TYP BPU: NORMAL
BLOOD COMPONENT TYPE: NORMAL
BLOOD COMPONENT TYPE: NORMAL
BUN SERPL-MCNC: 11 MG/DL (ref 8–22)
CALCIUM SERPL-MCNC: 8.8 MG/DL (ref 8.5–10.5)
CHLORIDE BLD-SCNC: 107 MMOL/L (ref 98–107)
CO2 SERPL-SCNC: 19 MMOL/L (ref 22–31)
CODING SYSTEM: NORMAL
CODING SYSTEM: NORMAL
CREAT SERPL-MCNC: 0.8 MG/DL (ref 0.6–1.1)
CROSSMATCH: NORMAL
CROSSMATCH: NORMAL
EOSINOPHIL # BLD AUTO: 0.3 10E3/UL (ref 0–0.7)
EOSINOPHIL NFR BLD AUTO: 5 %
ERYTHROCYTE [DISTWIDTH] IN BLOOD BY AUTOMATED COUNT: 24.8 % (ref 10–15)
GFR SERPL CREATININE-BSD FRML MDRD: >90 ML/MIN/1.73M2
GLUCOSE BLD-MCNC: 94 MG/DL (ref 70–125)
HCT VFR BLD AUTO: 22.1 % (ref 35–47)
HGB BLD-MCNC: 5.6 G/DL (ref 11.7–15.7)
IMM GRANULOCYTES # BLD: 0 10E3/UL
IMM GRANULOCYTES NFR BLD: 0 %
IRON BINDING CAPACITY (ROCHE): 464 UG/DL (ref 240–430)
IRON SATN MFR SERPL: 3 % (ref 15–46)
IRON SERPL-MCNC: 15 UG/DL (ref 37–145)
ISSUE DATE AND TIME: NORMAL
ISSUE DATE AND TIME: NORMAL
K AG RBC QL: NEGATIVE
LYMPHOCYTES # BLD AUTO: 2.1 10E3/UL (ref 0.8–5.3)
LYMPHOCYTES NFR BLD AUTO: 35 %
MCH RBC QN AUTO: 16.7 PG (ref 26.5–33)
MCHC RBC AUTO-ENTMCNC: 25.3 G/DL (ref 31.5–36.5)
MCV RBC AUTO: 66 FL (ref 78–100)
MONOCYTES # BLD AUTO: 0.5 10E3/UL (ref 0–1.3)
MONOCYTES NFR BLD AUTO: 8 %
NEUTROPHILS # BLD AUTO: 3 10E3/UL (ref 1.6–8.3)
NEUTROPHILS NFR BLD AUTO: 51 %
NRBC # BLD AUTO: 0 10E3/UL
NRBC BLD AUTO-RTO: 0 /100
PLATELET # BLD AUTO: 143 10E3/UL (ref 150–450)
POTASSIUM BLD-SCNC: 3.5 MMOL/L (ref 3.5–5)
PROT SERPL-MCNC: 7.6 G/DL (ref 6–8)
RBC # BLD AUTO: 3.36 10E6/UL (ref 3.8–5.2)
SODIUM SERPL-SCNC: 141 MMOL/L (ref 136–145)
SPECIMEN EXPIRATION DATE: ABNORMAL
SPECIMEN EXPIRATION DATE: NORMAL
SPECIMEN EXPIRATION DATE: NORMAL
UNIT ABO/RH: NORMAL
UNIT ABO/RH: NORMAL
UNIT NUMBER: NORMAL
UNIT NUMBER: NORMAL
UNIT STATUS: NORMAL
UNIT STATUS: NORMAL
UNIT TYPE ISBT: 7300
UNIT TYPE ISBT: 7300
WBC # BLD AUTO: 5.9 10E3/UL (ref 4–11)

## 2023-01-04 PROCEDURE — 86901 BLOOD TYPING SEROLOGIC RH(D): CPT | Performed by: STUDENT IN AN ORGANIZED HEALTH CARE EDUCATION/TRAINING PROGRAM

## 2023-01-04 PROCEDURE — 85025 COMPLETE CBC W/AUTO DIFF WBC: CPT | Performed by: STUDENT IN AN ORGANIZED HEALTH CARE EDUCATION/TRAINING PROGRAM

## 2023-01-04 PROCEDURE — 99285 EMERGENCY DEPT VISIT HI MDM: CPT | Mod: 25

## 2023-01-04 PROCEDURE — 83550 IRON BINDING TEST: CPT | Performed by: FAMILY MEDICINE

## 2023-01-04 PROCEDURE — 96360 HYDRATION IV INFUSION INIT: CPT

## 2023-01-04 PROCEDURE — 80053 COMPREHEN METABOLIC PANEL: CPT | Performed by: STUDENT IN AN ORGANIZED HEALTH CARE EDUCATION/TRAINING PROGRAM

## 2023-01-04 PROCEDURE — 250N000011 HC RX IP 250 OP 636: Performed by: STUDENT IN AN ORGANIZED HEALTH CARE EDUCATION/TRAINING PROGRAM

## 2023-01-04 PROCEDURE — 99223 1ST HOSP IP/OBS HIGH 75: CPT | Performed by: FAMILY MEDICINE

## 2023-01-04 PROCEDURE — 258N000003 HC RX IP 258 OP 636: Performed by: STUDENT IN AN ORGANIZED HEALTH CARE EDUCATION/TRAINING PROGRAM

## 2023-01-04 PROCEDURE — 258N000003 HC RX IP 258 OP 636: Performed by: FAMILY MEDICINE

## 2023-01-04 PROCEDURE — 74174 CTA ABD&PLVS W/CONTRAST: CPT

## 2023-01-04 PROCEDURE — P9016 RBC LEUKOCYTES REDUCED: HCPCS | Performed by: STUDENT IN AN ORGANIZED HEALTH CARE EDUCATION/TRAINING PROGRAM

## 2023-01-04 PROCEDURE — 86922 COMPATIBILITY TEST ANTIGLOB: CPT | Performed by: STUDENT IN AN ORGANIZED HEALTH CARE EDUCATION/TRAINING PROGRAM

## 2023-01-04 PROCEDURE — 96361 HYDRATE IV INFUSION ADD-ON: CPT

## 2023-01-04 PROCEDURE — 120N000001 HC R&B MED SURG/OB

## 2023-01-04 PROCEDURE — 86870 RBC ANTIBODY IDENTIFICATION: CPT | Performed by: STUDENT IN AN ORGANIZED HEALTH CARE EDUCATION/TRAINING PROGRAM

## 2023-01-04 PROCEDURE — 86905 BLOOD TYPING RBC ANTIGENS: CPT | Performed by: STUDENT IN AN ORGANIZED HEALTH CARE EDUCATION/TRAINING PROGRAM

## 2023-01-04 PROCEDURE — 36415 COLL VENOUS BLD VENIPUNCTURE: CPT | Performed by: STUDENT IN AN ORGANIZED HEALTH CARE EDUCATION/TRAINING PROGRAM

## 2023-01-04 RX ORDER — ONDANSETRON 4 MG/1
4 TABLET, ORALLY DISINTEGRATING ORAL EVERY 6 HOURS PRN
Status: DISCONTINUED | OUTPATIENT
Start: 2023-01-04 | End: 2023-01-05 | Stop reason: HOSPADM

## 2023-01-04 RX ORDER — SODIUM CHLORIDE 9 MG/ML
INJECTION, SOLUTION INTRAVENOUS CONTINUOUS
Status: DISCONTINUED | OUTPATIENT
Start: 2023-01-04 | End: 2023-01-05 | Stop reason: HOSPADM

## 2023-01-04 RX ORDER — METHYLPREDNISOLONE SODIUM SUCCINATE 40 MG/ML
20 INJECTION, POWDER, LYOPHILIZED, FOR SOLUTION INTRAMUSCULAR; INTRAVENOUS EVERY 8 HOURS
Status: DISCONTINUED | OUTPATIENT
Start: 2023-01-05 | End: 2023-01-05 | Stop reason: HOSPADM

## 2023-01-04 RX ORDER — IOPAMIDOL 755 MG/ML
90 INJECTION, SOLUTION INTRAVASCULAR ONCE
Status: COMPLETED | OUTPATIENT
Start: 2023-01-04 | End: 2023-01-04

## 2023-01-04 RX ORDER — LIDOCAINE 40 MG/G
CREAM TOPICAL
Status: DISCONTINUED | OUTPATIENT
Start: 2023-01-04 | End: 2023-01-05 | Stop reason: HOSPADM

## 2023-01-04 RX ORDER — METHYLPREDNISOLONE SODIUM SUCCINATE 125 MG/2ML
20 INJECTION, POWDER, LYOPHILIZED, FOR SOLUTION INTRAMUSCULAR; INTRAVENOUS ONCE
Status: COMPLETED | OUTPATIENT
Start: 2023-01-04 | End: 2023-01-04

## 2023-01-04 RX ORDER — ACETAMINOPHEN 650 MG/1
650 SUPPOSITORY RECTAL EVERY 6 HOURS PRN
Status: DISCONTINUED | OUTPATIENT
Start: 2023-01-04 | End: 2023-01-05 | Stop reason: HOSPADM

## 2023-01-04 RX ORDER — ACETAMINOPHEN 325 MG/1
650 TABLET ORAL EVERY 6 HOURS PRN
Status: DISCONTINUED | OUTPATIENT
Start: 2023-01-04 | End: 2023-01-05 | Stop reason: HOSPADM

## 2023-01-04 RX ORDER — MESALAMINE 1.2 G/1
4.8 TABLET, DELAYED RELEASE ORAL
Status: DISCONTINUED | OUTPATIENT
Start: 2023-01-05 | End: 2023-01-05 | Stop reason: HOSPADM

## 2023-01-04 RX ORDER — ONDANSETRON 2 MG/ML
4 INJECTION INTRAMUSCULAR; INTRAVENOUS EVERY 6 HOURS PRN
Status: DISCONTINUED | OUTPATIENT
Start: 2023-01-04 | End: 2023-01-05 | Stop reason: HOSPADM

## 2023-01-04 RX ADMIN — METHYLPREDNISOLONE SODIUM SUCCINATE 18.75 MG: 125 INJECTION, POWDER, FOR SOLUTION INTRAMUSCULAR; INTRAVENOUS at 19:00

## 2023-01-04 RX ADMIN — SODIUM CHLORIDE, POTASSIUM CHLORIDE, SODIUM LACTATE AND CALCIUM CHLORIDE 1000 ML: 600; 310; 30; 20 INJECTION, SOLUTION INTRAVENOUS at 16:58

## 2023-01-04 RX ADMIN — IOPAMIDOL 90 ML: 755 INJECTION, SOLUTION INTRAVENOUS at 17:34

## 2023-01-04 RX ADMIN — SODIUM CHLORIDE: 9 INJECTION, SOLUTION INTRAVENOUS at 20:40

## 2023-01-04 ASSESSMENT — ACTIVITIES OF DAILY LIVING (ADL)
ADLS_ACUITY_SCORE: 35
ADLS_ACUITY_SCORE: 35
ADLS_ACUITY_SCORE: 37
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
## 2023-01-04 NOTE — ED NOTES
Lab called stating blood may take up to 2 hours to prepare secondary to possible antibodies and further testing.  MD and patient updated.  Offered patient meal tray and she declined, gave warm blanket for comfort.  VSS.  Advised to ask for assistance if needed.

## 2023-01-04 NOTE — ED TRIAGE NOTES
Arrives to ED with c/o rectal bleeding. Bright red. +clots. Dx with IBS x2 months ago. Has been bleeding since. Reports SOB and lightheadedness x3 weeks. +N. Denies emesis. Afebrile.      Triage Assessment     Row Name 01/04/23 4896       Triage Assessment (Adult)    Airway WDL WDL       Respiratory WDL    Respiratory WDL WDL       Skin Circulation/Temperature WDL    Skin Circulation/Temperature WDL WDL       Cardiac WDL    Cardiac WDL X;rhythm    Pulse Rate & Regularity tachycardic       Peripheral/Neurovascular WDL    Peripheral Neurovascular WDL WDL       Cognitive/Neuro/Behavioral WDL    Cognitive/Neuro/Behavioral WDL WDL

## 2023-01-05 VITALS
SYSTOLIC BLOOD PRESSURE: 140 MMHG | WEIGHT: 149.8 LBS | DIASTOLIC BLOOD PRESSURE: 68 MMHG | RESPIRATION RATE: 16 BRPM | BODY MASS INDEX: 25.57 KG/M2 | HEART RATE: 77 BPM | OXYGEN SATURATION: 98 % | HEIGHT: 64 IN | TEMPERATURE: 98.1 F

## 2023-01-05 PROCEDURE — 250N000011 HC RX IP 250 OP 636: Performed by: FAMILY MEDICINE

## 2023-01-05 PROCEDURE — P9016 RBC LEUKOCYTES REDUCED: HCPCS | Performed by: STUDENT IN AN ORGANIZED HEALTH CARE EDUCATION/TRAINING PROGRAM

## 2023-01-05 RX ADMIN — METHYLPREDNISOLONE SODIUM SUCCINATE 20 MG: 40 INJECTION, POWDER, FOR SOLUTION INTRAMUSCULAR; INTRAVENOUS at 00:10

## 2023-01-05 ASSESSMENT — ACTIVITIES OF DAILY LIVING (ADL)
ADLS_ACUITY_SCORE: 37
ADLS_ACUITY_SCORE: 37
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
## 2023-01-05 NOTE — PROGRESS NOTES
Got second unit of blood. Vitally stable. Still having some bloody discharge from rectum.   She has decided to leave AMA. Refused to wait for provider. Signed AMA form.

## 2023-01-05 NOTE — DISCHARGE SUMMARY
Patient left AMA from emergency department.  Tried to call for taper dose of oral steroid and oral iron supplement.  Unable to reach the patient.  Close follow-up with gastroenterology for IBD flareup.

## 2023-10-08 ENCOUNTER — APPOINTMENT (OUTPATIENT)
Dept: CT IMAGING | Facility: CLINIC | Age: 43
DRG: 387 | End: 2023-10-08
Attending: EMERGENCY MEDICINE

## 2023-10-08 ENCOUNTER — HOSPITAL ENCOUNTER (INPATIENT)
Facility: CLINIC | Age: 43
LOS: 1 days | Discharge: LEFT BEFORE DISCHARGE | DRG: 387 | End: 2023-10-09
Attending: EMERGENCY MEDICINE | Admitting: INTERNAL MEDICINE

## 2023-10-08 DIAGNOSIS — R10.30 ABDOMINAL PAIN, LOWER: ICD-10-CM

## 2023-10-08 DIAGNOSIS — K52.9 ACUTE COLITIS: ICD-10-CM

## 2023-10-08 DIAGNOSIS — K92.2 LOWER GI BLEED: ICD-10-CM

## 2023-10-08 LAB
ABO/RH(D): NORMAL
ALBUMIN SERPL BCG-MCNC: 3.6 G/DL (ref 3.5–5.2)
ALP SERPL-CCNC: 105 U/L (ref 35–104)
ALT SERPL W P-5'-P-CCNC: 10 U/L (ref 0–50)
ANION GAP SERPL CALCULATED.3IONS-SCNC: 9 MMOL/L (ref 7–15)
ANTIBODY SCREEN: NEGATIVE
AST SERPL W P-5'-P-CCNC: 22 U/L (ref 0–45)
BASO+EOS+MONOS # BLD AUTO: ABNORMAL 10*3/UL
BASO+EOS+MONOS NFR BLD AUTO: ABNORMAL %
BASOPHILS # BLD AUTO: 0.1 10E3/UL (ref 0–0.2)
BASOPHILS NFR BLD AUTO: 1 %
BILIRUB SERPL-MCNC: <0.2 MG/DL
BUN SERPL-MCNC: 14.6 MG/DL (ref 6–20)
CALCIUM SERPL-MCNC: 8.8 MG/DL (ref 8.6–10)
CHLORIDE SERPL-SCNC: 106 MMOL/L (ref 98–107)
CREAT SERPL-MCNC: 0.7 MG/DL (ref 0.51–0.95)
CRP SERPL-MCNC: 38.7 MG/L
DEPRECATED HCO3 PLAS-SCNC: 23 MMOL/L (ref 22–29)
EGFRCR SERPLBLD CKD-EPI 2021: >90 ML/MIN/1.73M2
EOSINOPHIL # BLD AUTO: 0.5 10E3/UL (ref 0–0.7)
EOSINOPHIL NFR BLD AUTO: 4 %
ERYTHROCYTE [DISTWIDTH] IN BLOOD BY AUTOMATED COUNT: 17.2 % (ref 10–15)
GLUCOSE SERPL-MCNC: 95 MG/DL (ref 70–99)
GROUP A STREP BY PCR: NOT DETECTED
HCG SERPL QL: NEGATIVE
HCT VFR BLD AUTO: 28.1 % (ref 35–47)
HGB BLD-MCNC: 8.3 G/DL (ref 11.7–15.7)
IMM GRANULOCYTES # BLD: 0.1 10E3/UL
IMM GRANULOCYTES NFR BLD: 0 %
LACTATE SERPL-SCNC: 1 MMOL/L (ref 0.7–2)
LIPASE SERPL-CCNC: 42 U/L (ref 13–60)
LYMPHOCYTES # BLD AUTO: 2.1 10E3/UL (ref 0.8–5.3)
LYMPHOCYTES NFR BLD AUTO: 16 %
MCH RBC QN AUTO: 22.1 PG (ref 26.5–33)
MCHC RBC AUTO-ENTMCNC: 29.5 G/DL (ref 31.5–36.5)
MCV RBC AUTO: 75 FL (ref 78–100)
MONOCYTES # BLD AUTO: 1.2 10E3/UL (ref 0–1.3)
MONOCYTES NFR BLD AUTO: 9 %
NEUTROPHILS # BLD AUTO: 9.3 10E3/UL (ref 1.6–8.3)
NEUTROPHILS NFR BLD AUTO: 70 %
NRBC # BLD AUTO: 0 10E3/UL
NRBC BLD AUTO-RTO: 0 /100
PLATELET # BLD AUTO: 400 10E3/UL (ref 150–450)
POTASSIUM SERPL-SCNC: 3.9 MMOL/L (ref 3.4–5.3)
PROT SERPL-MCNC: 6.7 G/DL (ref 6.4–8.3)
RBC # BLD AUTO: 3.75 10E6/UL (ref 3.8–5.2)
SODIUM SERPL-SCNC: 138 MMOL/L (ref 135–145)
SPECIMEN EXPIRATION DATE: NORMAL
WBC # BLD AUTO: 13.2 10E3/UL (ref 4–11)

## 2023-10-08 PROCEDURE — 250N000011 HC RX IP 250 OP 636: Performed by: EMERGENCY MEDICINE

## 2023-10-08 PROCEDURE — 85025 COMPLETE CBC W/AUTO DIFF WBC: CPT | Performed by: EMERGENCY MEDICINE

## 2023-10-08 PROCEDURE — 83605 ASSAY OF LACTIC ACID: CPT | Performed by: EMERGENCY MEDICINE

## 2023-10-08 PROCEDURE — 83735 ASSAY OF MAGNESIUM: CPT | Performed by: INTERNAL MEDICINE

## 2023-10-08 PROCEDURE — 86140 C-REACTIVE PROTEIN: CPT | Performed by: EMERGENCY MEDICINE

## 2023-10-08 PROCEDURE — 120N000001 HC R&B MED SURG/OB

## 2023-10-08 PROCEDURE — 96375 TX/PRO/DX INJ NEW DRUG ADDON: CPT

## 2023-10-08 PROCEDURE — 86901 BLOOD TYPING SEROLOGIC RH(D): CPT | Performed by: EMERGENCY MEDICINE

## 2023-10-08 PROCEDURE — 99285 EMERGENCY DEPT VISIT HI MDM: CPT | Mod: 25

## 2023-10-08 PROCEDURE — 96361 HYDRATE IV INFUSION ADD-ON: CPT

## 2023-10-08 PROCEDURE — 87651 STREP A DNA AMP PROBE: CPT | Performed by: EMERGENCY MEDICINE

## 2023-10-08 PROCEDURE — 83690 ASSAY OF LIPASE: CPT | Performed by: EMERGENCY MEDICINE

## 2023-10-08 PROCEDURE — 84703 CHORIONIC GONADOTROPIN ASSAY: CPT | Performed by: EMERGENCY MEDICINE

## 2023-10-08 PROCEDURE — 74174 CTA ABD&PLVS W/CONTRAST: CPT

## 2023-10-08 PROCEDURE — 99223 1ST HOSP IP/OBS HIGH 75: CPT | Performed by: INTERNAL MEDICINE

## 2023-10-08 PROCEDURE — 80053 COMPREHEN METABOLIC PANEL: CPT | Performed by: EMERGENCY MEDICINE

## 2023-10-08 PROCEDURE — 250N000011 HC RX IP 250 OP 636: Mod: JZ | Performed by: EMERGENCY MEDICINE

## 2023-10-08 PROCEDURE — 96374 THER/PROPH/DIAG INJ IV PUSH: CPT

## 2023-10-08 PROCEDURE — 36415 COLL VENOUS BLD VENIPUNCTURE: CPT | Performed by: EMERGENCY MEDICINE

## 2023-10-08 RX ORDER — FENTANYL CITRATE 50 UG/ML
50 INJECTION, SOLUTION INTRAMUSCULAR; INTRAVENOUS ONCE
Status: COMPLETED | OUTPATIENT
Start: 2023-10-08 | End: 2023-10-08

## 2023-10-08 RX ORDER — IOPAMIDOL 755 MG/ML
100 INJECTION, SOLUTION INTRAVASCULAR ONCE
Status: COMPLETED | OUTPATIENT
Start: 2023-10-08 | End: 2023-10-08

## 2023-10-08 RX ORDER — ONDANSETRON 2 MG/ML
4 INJECTION INTRAMUSCULAR; INTRAVENOUS ONCE
Status: COMPLETED | OUTPATIENT
Start: 2023-10-08 | End: 2023-10-08

## 2023-10-08 RX ORDER — METHYLPREDNISOLONE SODIUM SUCCINATE 125 MG/2ML
32 INJECTION, POWDER, LYOPHILIZED, FOR SOLUTION INTRAMUSCULAR; INTRAVENOUS 2 TIMES DAILY
Status: DISCONTINUED | OUTPATIENT
Start: 2023-10-08 | End: 2023-10-09 | Stop reason: HOSPADM

## 2023-10-08 RX ORDER — METHYLPREDNISOLONE SODIUM SUCCINATE 125 MG/2ML
125 INJECTION, POWDER, LYOPHILIZED, FOR SOLUTION INTRAMUSCULAR; INTRAVENOUS ONCE
Status: DISCONTINUED | OUTPATIENT
Start: 2023-10-08 | End: 2023-10-08

## 2023-10-08 RX ADMIN — IOPAMIDOL 100 ML: 755 INJECTION, SOLUTION INTRAVENOUS at 22:10

## 2023-10-08 RX ADMIN — METHYLPREDNISOLONE SODIUM SUCCINATE 31.25 MG: 125 INJECTION, POWDER, FOR SOLUTION INTRAMUSCULAR; INTRAVENOUS at 23:15

## 2023-10-08 RX ADMIN — FENTANYL CITRATE 50 MCG: 50 INJECTION, SOLUTION INTRAMUSCULAR; INTRAVENOUS at 21:28

## 2023-10-08 RX ADMIN — ONDANSETRON 4 MG: 2 INJECTION INTRAMUSCULAR; INTRAVENOUS at 21:27

## 2023-10-08 ASSESSMENT — ACTIVITIES OF DAILY LIVING (ADL)
ADLS_ACUITY_SCORE: 35
ADLS_ACUITY_SCORE: 33

## 2023-10-09 ENCOUNTER — OFFICE VISIT (OUTPATIENT)
Dept: FAMILY MEDICINE | Facility: CLINIC | Age: 43
End: 2023-10-09

## 2023-10-09 VITALS
SYSTOLIC BLOOD PRESSURE: 123 MMHG | DIASTOLIC BLOOD PRESSURE: 83 MMHG | BODY MASS INDEX: 24.63 KG/M2 | HEIGHT: 64 IN | RESPIRATION RATE: 20 BRPM | OXYGEN SATURATION: 98 % | TEMPERATURE: 98.9 F | HEART RATE: 107 BPM | WEIGHT: 144.3 LBS

## 2023-10-09 VITALS
DIASTOLIC BLOOD PRESSURE: 74 MMHG | BODY MASS INDEX: 23.92 KG/M2 | OXYGEN SATURATION: 100 % | RESPIRATION RATE: 25 BRPM | WEIGHT: 130 LBS | HEART RATE: 88 BPM | TEMPERATURE: 96.9 F | HEIGHT: 62 IN | SYSTOLIC BLOOD PRESSURE: 117 MMHG

## 2023-10-09 DIAGNOSIS — K92.2 LOWER GI BLEED: Primary | Chronic | ICD-10-CM

## 2023-10-09 LAB
ADV 40+41 DNA STL QL NAA+NON-PROBE: NEGATIVE
ALBUMIN UR-MCNC: 70 MG/DL
APPEARANCE UR: ABNORMAL
ASTRO TYP 1-8 RNA STL QL NAA+NON-PROBE: NEGATIVE
BILIRUB UR QL STRIP: NEGATIVE
C CAYETANENSIS DNA STL QL NAA+NON-PROBE: NEGATIVE
C DIFF TOX B STL QL: NEGATIVE
CAMPYLOBACTER DNA SPEC NAA+PROBE: NEGATIVE
COLOR UR AUTO: ABNORMAL
CRYPTOSP DNA STL QL NAA+NON-PROBE: NEGATIVE
E COLI O157 DNA STL QL NAA+NON-PROBE: NORMAL
E HISTOLYT DNA STL QL NAA+NON-PROBE: NEGATIVE
EAEC ASTA GENE ISLT QL NAA+PROBE: NEGATIVE
EC STX1+STX2 GENES STL QL NAA+NON-PROBE: NEGATIVE
EPEC EAE GENE STL QL NAA+NON-PROBE: NEGATIVE
ERYTHROCYTE [DISTWIDTH] IN BLOOD BY AUTOMATED COUNT: 17.3 % (ref 10–15)
ETEC LTA+ST1A+ST1B TOX ST NAA+NON-PROBE: NEGATIVE
G LAMBLIA DNA STL QL NAA+NON-PROBE: NEGATIVE
GLUCOSE UR STRIP-MCNC: NEGATIVE MG/DL
HCT VFR BLD AUTO: 31.8 % (ref 35–47)
HEMOCCULT STL QL: POSITIVE
HGB BLD-MCNC: 9.3 G/DL (ref 11.7–15.7)
HGB UR QL STRIP: ABNORMAL
KETONES UR STRIP-MCNC: NEGATIVE MG/DL
LEUKOCYTE ESTERASE UR QL STRIP: ABNORMAL
MAGNESIUM SERPL-MCNC: 1.9 MG/DL (ref 1.7–2.3)
MCH RBC QN AUTO: 21.9 PG (ref 26.5–33)
MCHC RBC AUTO-ENTMCNC: 29.2 G/DL (ref 31.5–36.5)
MCV RBC AUTO: 75 FL (ref 78–100)
MUCOUS THREADS #/AREA URNS LPF: PRESENT /LPF
NITRATE UR QL: NEGATIVE
NOROVIRUS GI+II RNA STL QL NAA+NON-PROBE: NEGATIVE
P SHIGELLOIDES DNA STL QL NAA+NON-PROBE: NEGATIVE
PH UR STRIP: 6 [PH] (ref 5–7)
PLATELET # BLD AUTO: 493 10E3/UL (ref 150–450)
RBC # BLD AUTO: 4.25 10E6/UL (ref 3.8–5.2)
RBC URINE: >182 /HPF
RVA RNA STL QL NAA+NON-PROBE: NEGATIVE
SALMONELLA SP RPOD STL QL NAA+PROBE: NEGATIVE
SAPO I+II+IV+V RNA STL QL NAA+NON-PROBE: NEGATIVE
SHIGELLA SP+EIEC IPAH ST NAA+NON-PROBE: NEGATIVE
SP GR UR STRIP: 1.01 (ref 1–1.03)
SQUAMOUS EPITHELIAL: 4 /HPF
UROBILINOGEN UR STRIP-MCNC: <2 MG/DL
V CHOLERAE DNA SPEC QL NAA+PROBE: NEGATIVE
VIBRIO DNA SPEC NAA+PROBE: NEGATIVE
WBC # BLD AUTO: 13.7 10E3/UL (ref 4–11)
WBC CLUMPS #/AREA URNS HPF: PRESENT /HPF
WBC URINE: >182 /HPF
Y ENTEROCOL DNA STL QL NAA+PROBE: NEGATIVE

## 2023-10-09 PROCEDURE — 82272 OCCULT BLD FECES 1-3 TESTS: CPT | Performed by: EMERGENCY MEDICINE

## 2023-10-09 PROCEDURE — 258N000003 HC RX IP 258 OP 636: Performed by: INTERNAL MEDICINE

## 2023-10-09 PROCEDURE — 99214 OFFICE O/P EST MOD 30 MIN: CPT

## 2023-10-09 PROCEDURE — 85027 COMPLETE CBC AUTOMATED: CPT

## 2023-10-09 PROCEDURE — 36415 COLL VENOUS BLD VENIPUNCTURE: CPT

## 2023-10-09 PROCEDURE — 87493 C DIFF AMPLIFIED PROBE: CPT | Performed by: EMERGENCY MEDICINE

## 2023-10-09 PROCEDURE — 87086 URINE CULTURE/COLONY COUNT: CPT | Performed by: EMERGENCY MEDICINE

## 2023-10-09 PROCEDURE — 81001 URINALYSIS AUTO W/SCOPE: CPT | Performed by: EMERGENCY MEDICINE

## 2023-10-09 RX ORDER — PREDNISONE 20 MG/1
TABLET ORAL
Qty: 20 TABLET | Refills: 0 | Status: ON HOLD | OUTPATIENT
Start: 2023-10-09 | End: 2023-10-19

## 2023-10-09 RX ORDER — ACETAMINOPHEN 325 MG/1
650 TABLET ORAL EVERY 4 HOURS PRN
Status: DISCONTINUED | OUTPATIENT
Start: 2023-10-09 | End: 2023-10-09 | Stop reason: HOSPADM

## 2023-10-09 RX ORDER — ACETAMINOPHEN 650 MG/1
650 SUPPOSITORY RECTAL EVERY 6 HOURS PRN
Status: DISCONTINUED | OUTPATIENT
Start: 2023-10-09 | End: 2023-10-09 | Stop reason: HOSPADM

## 2023-10-09 RX ORDER — HYDROMORPHONE HCL IN WATER/PF 6 MG/30 ML
0.2 PATIENT CONTROLLED ANALGESIA SYRINGE INTRAVENOUS EVERY 4 HOURS PRN
Status: DISCONTINUED | OUTPATIENT
Start: 2023-10-09 | End: 2023-10-09 | Stop reason: HOSPADM

## 2023-10-09 RX ORDER — LORAZEPAM 2 MG/ML
0.25 INJECTION INTRAMUSCULAR EVERY 6 HOURS PRN
Status: DISCONTINUED | OUTPATIENT
Start: 2023-10-09 | End: 2023-10-09 | Stop reason: HOSPADM

## 2023-10-09 RX ORDER — SODIUM CHLORIDE 9 MG/ML
INJECTION, SOLUTION INTRAVENOUS CONTINUOUS
Status: DISCONTINUED | OUTPATIENT
Start: 2023-10-09 | End: 2023-10-09 | Stop reason: HOSPADM

## 2023-10-09 RX ADMIN — SODIUM CHLORIDE: 9 INJECTION, SOLUTION INTRAVENOUS at 01:27

## 2023-10-09 ASSESSMENT — ACTIVITIES OF DAILY LIVING (ADL)
ADLS_ACUITY_SCORE: 35
ADLS_ACUITY_SCORE: 35
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
## 2023-10-09 NOTE — COMMUNITY RESOURCES LIST (ENGLISH)
10/09/2023   [facility]  N/A  For questions about this resource list or additional care needs, please contact your primary care clinic or care manager.  Phone: [phone]   Email: N/A   Address: [address] [place] [address]   Hours: N/A        Food and Nutrition       Food pantry  1  [facility] Food Shelf and Emergency Food Distance: 1.66 miles      Pickup   [address] [place] [address]  Language: English  Hours: Mon - Sat 9:00 AM - 5:00 PM  Fees: Free   Phone: (674) 728-3571 Email: info@[facility].org Website: http://www.[facility].org     2  [facility] Food Pantry Distance: 4.2 miles      Pickup   [address] [place] [address]  Language: English  Hours: Tue - Wed 9:00 AM - 2:00 PM , Thu 12:00 PM - 5:00 PM  Fees: Free   Phone: (223) 723-5769 Website: http://www.Groupiter/     SNAP application assistance  3  [facility] SNAP Registration Assistance Distance: 1.66 miles      Phone/Virtual   [address] [place] [address]  Language: English  Fees: Free   Phone: (444) 672-2643 Email: info@[facility].org Website: http://www.[employer] SystemsMat[facility]inally InsaneConfidex.org     4  Community Action Partnership ([facility]) [facility] [place]  [facility] Distance: 11.38 miles      In-Person   [address] [place] [address]  Language: English, Vietnamese  Hours: Mon - Fri 8:00 AM - 8:00 PM  Fees: Free   Phone: (405) 216-8787 Email: info@capagency.org Website: http://www.capagency.org     Soup kitchen or free meals  5  [facility] Distance: 1.33 miles      In-Person   [address] [place] [address]  Language: English  Hours: Wed 6:00 PM - 6:30 PM  Fees: Free   Phone: (203) 558-4930 Email: office@[facility].org Website: https://[facility].org/     6  [facility] Distance: 6.89 miles      In-Person   [address] [place] [address]   Language: English  Hours: Fri 6:00 PM - 8:00 PM  Fees: Free   Phone: (880) 230-5957 Email: fang@saintritas.Reachable Website: http://www.saintriSunStream Networkss.Reachable/          Important Numbers & Websites       Emergency Services   911  [facility] Services   311  Poison Control   (112) 814-5404  Suicide Prevention Lifeline   (301) 483-3862 (TALK)  Child Abuse Hotline   (827) 239-2283 (4-A-Child)  Sexual Assault Hotline   (440) 106-4103 (HOPE)  National Runaway Safeline   (692) 555-1229 (RUNAWAY)  All-Options Talkline   (874) 132-5582  Substance Abuse Referral   (909) 883-4212 (HELP)

## 2023-10-09 NOTE — COMMUNITY RESOURCES LIST (ENGLISH)
10/09/2023   [facility]  N/A  For questions about this resource list or additional care needs, please contact your primary care clinic or care manager.  Phone: [phone]   Email: N/A   Address: [address] [place] [address]   Hours: N/A        Food and Nutrition       Food pantry  1  [facility] Food Shelf and Emergency Food Distance: 1.66 miles      Pickup   [address] [place] [address]  Language: English  Hours: Mon - Sat 9:00 AM - 5:00 PM  Fees: Free   Phone: (432) 113-9482 Email: info@[facility].org Website: http://www.[facility].org     2  [facility] Food Pantry Distance: 4.2 miles      Pickup   [address] [place] [address]  Language: English  Hours: Tue - Wed 9:00 AM - 2:00 PM , Thu 12:00 PM - 5:00 PM  Fees: Free   Phone: (280) 465-7673 Website: http://www.slinkset/     SNAP application assistance  3  [facility] SNAP Registration Assistance Distance: 1.66 miles      Phone/Virtual   [address] [place] [address]  Language: English  Fees: Free   Phone: (661) 803-3281 Email: info@[facility].org Website: http://www.BrainjuicerB[facility] HospitalBeautyTicket.com.org     4  Community Action Partnership ([facility]) [facility]  [facility] Distance: 11.38 miles      In-Person   [address] [place] [address]  Language: English, Belarusian  Hours: Mon - Fri 8:00 AM - 8:00 PM  Fees: Free   Phone: (863) 254-5758 Email: info@capagency.org Website: http://www.capagency.org     Soup kitchen or free meals  5  [facility] Distance: 1.33 miles      In-Person   [address] [place] [address]  Language: English  Hours: Wed 6:00 PM - 6:30 PM  Fees: Free   Phone: (768) 630-2011 Email: office@[facility].org Website: https://[facility].org/     6  [facility] Distance: 6.89 miles      In-Person   [address] [place] [address]   Language: English  Hours: Fri 6:00 PM - 8:00 PM  Fees: Free   Phone: (755) 467-1048 Email: fang@saintrit[employer] Website: http://www.saintriLiving Lens Enterprises.[employer]/          Important Numbers & Websites       Emergency Services   911  [facility] Services   311  Poison Control   (394) 448-5650  Suicide Prevention Lifeline   (344) 420-5109 (TALK)  Child Abuse Hotline   (242) 623-9847 (4-A-Child)  Sexual Assault Hotline   (530) 149-9823 (HOPE)  National Runaway Safeline   (662) 846-2373 (RUNAWAY)  All-Options Talkline   (313) 737-1475  Substance Abuse Referral   (164) 713-1359 (HELP)

## 2023-10-09 NOTE — DISCHARGE SUMMARY
[facility] MEDICINE  DISCHARGE SUMMARY      Patient signed AMA form with RN     I did not see the patient when she left.    RN explained to her that current condition can cause death if she leaves AMA.   She was competent to make decision  RN instructed to follow up with GI clinic and/or to return to ED        P

## 2023-10-09 NOTE — PLAN OF CARE
Pt just signed out AMA. Pt stated she will F/U with GI for her rectal bleeding. She was made aware pt can die from her condition. She aware if the bleeding continues that she may return to the ER for further work up. Pt states she will have medical insurance in 3 days and will feel better about returning if she needs medical attention. Pt is alert and competent to make her own decisions.     PUSHPA MALDONADO RN

## 2023-10-09 NOTE — ED TRIAGE NOTES
3 weeks of rectal bleeding. Today she noticed that it increased with large clots. Has lower abdominal cramping. Hx of UC and chrons.      Triage Assessment       Row Name 10/08/23 2035       Triage Assessment (Adult)    Airway WDL WDL       Respiratory WDL    Respiratory WDL WDL       Skin Circulation/Temperature WDL    Skin Circulation/Temperature WDL WDL       Cardiac WDL    Cardiac WDL WDL       Peripheral/Neurovascular WDL    Peripheral Neurovascular WDL WDL       Cognitive/Neuro/Behavioral WDL    Cognitive/Neuro/Behavioral WDL WDL

## 2023-10-09 NOTE — PROGRESS NOTES
Assessment & Plan     Lower GI bleed  Reports history of ulcerative colitis and Crohn's disease.  Seen in ED yesterday again with GI bleeding and lower abdominal pain.  She reports Solu-Medrol IV completely resolved symptoms and she denies blood in stools or abdominal pain today.  She was not discharged on a prednisone taper and that will be started today.  Hemoglobin was 8.3 in emergency department and we will recheck today.  Patient has scheduled follow-up with GI in 5 days.  She is encouraged to seek immediate medical treatment should she again have GI bleeding or worsening return of symptoms in that time.    Patient was admitted at [facility] on 3/10/2023 and had EGD and colonoscopy which showed ulcers suspicious of Crohn's and left side colitis with inflammation from the anus to the splenic flexure.  She was treated effectively with Solu-Medrol and prednisone and was to be started on infliximab as an outpatient.  She also had bleeding during that hospital course and was admitted with a hemoglobin of 4.5 and discharged with a hemoglobin of 9.3.    Review of the result(s) of each unique test - CBC, UA       MED REC REQUIRED  Post Medication Reconciliation Status:         MACY Rodriguez CNP  [facility]  Seen in ED yesterday and Hgb was 8.3. Will be able to see GI in 5 days.     Subjective   Nicki is a 43 year old, presenting for the following health issues:  Hospital F/U (Abdominal pain, [facility], 10/08/2023- 10/09/2023)        10/9/2023    11:12 AM   Additional Questions   Roomed by LAURA Cameron, new job. Started having bleeding with bowel movements.            Hospital Follow-up Visit:    Hospital/Nursing Home/IP Rehab Facility:  [facility]  Date of Admission: 10/08/2023  Date of Discharge: 10/09/2023  Reason(s) for Admission: Abdominal Pain    Was your hospitalization related to COVID-19? No   Problems taking medications regularly:  None  Medication changes  "since discharge: None  Problems adhering to non-medication therapy:  None    Summary of hospitalization:  [facility] discharge summary reviewed  Diagnostic Tests/Treatments reviewed.  Follow up needed: GI in 5 days  Other Healthcare Providers Involved in Patient s Care:         None  Update since discharge: improved.         Plan of care communicated with patient                   Review of Systems   Constitutional, HEENT, cardiovascular, pulmonary, gi and gu systems are negative, except as otherwise noted.      Objective    /83 (BP Location: Right arm, Patient Position: Sitting, Cuff Size: Adult Regular)   Pulse 107   Temp 98.9  F (37.2  C) (Oral)   Resp 20   Ht 1.626 m (5' 4\")   Wt 65.5 kg (144 lb 4.8 oz)   LMP 09/09/2023 (Approximate)   SpO2 98%   BMI 24.77 kg/m    Body mass index is 24.77 kg/m .  Physical Exam   GENERAL: healthy, alert and no distress  NECK: no adenopathy, no asymmetry, masses, or scars and thyroid normal to palpation  RESP: lungs clear to auscultation - no rales, rhonchi or wheezes  CV: regular rate and rhythm, normal S1 S2, no S3 or S4, no murmur, click or rub, no peripheral edema and peripheral pulses strong  ABDOMEN: soft, nontender, no hepatosplenomegaly, no masses and bowel sounds normal  MS: no gross musculoskeletal defects noted, no edema                      "
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
## 2023-10-09 NOTE — PLAN OF CARE
Paging [facility] for patient that is wishing to leave to take care of dog. She Believes she could follow up with her GI MD, However she has been having difficulty with scheduling appointments with them. Pt is wanting to go home to take care of a dog that has been crated since 7 pm.     PUSHPA MALDONADO RN

## 2023-10-10 LAB — BACTERIA UR CULT: NO GROWTH

## 2023-10-11 ENCOUNTER — NURSE TRIAGE (OUTPATIENT)
Dept: FAMILY MEDICINE | Facility: CLINIC | Age: 43
End: 2023-10-11

## 2023-10-11 ENCOUNTER — MYC MEDICAL ADVICE (OUTPATIENT)
Dept: FAMILY MEDICINE | Facility: CLINIC | Age: 43
End: 2023-10-11

## 2023-10-11 NOTE — TELEPHONE ENCOUNTER
S-(situation): rectal bleeding    B-(background): ER 10/8/23 lower GI bleed and lower abdomen pain. History of lower GI bleed.    A-(assessment): Pt reports having rectal bleeding at 3am and continued all night. Pt having nausea, fever, sore throat. Pt states she has loose, foamy stools with red blood. Intake reduced, taking fluids. Denies feeling faint, does not take blood thinners. Pt states she cannot get into GI until 10/26; appointment scheduled 10/13 was canceled due to outstanding balance owed by pt.     R-(recommendations): Go to ED now. Pt will follow advice.     Reason for Disposition   SEVERE rectal bleeding (large blood clots; constant or on and off bleeding)    Additional Information   Negative: Passed out (i.e., fainted, collapsed and was not responding)   Negative: Shock suspected (e.g., cold/pale/clammy skin, too weak to stand, low BP, rapid pulse)   Negative: Vomiting red blood or black (coffee ground) material   Negative: Sounds like a life-threatening emergency to the triager   Negative: Diarrhea is main symptom   Negative: Rectal symptoms   Negative: SEVERE dizziness (e.g., unable to stand, requires support to walk, feels like passing out now)   Negative: MODERATE rectal bleeding (small blood clots, passing blood without stool, or toilet water turns red) more than once a day   Negative: Bloody, black, or tarry bowel movements  (Exception: Chronic-unchanged black-grey bowel movements and is taking iron pills or Pepto-Bismol.)   Negative: High-risk adult (e.g., prior surgery on aorta, abdominal aortic aneurysm)   Negative: Rectal foreign body (inserted or swallowed)    Protocols used: Rectal Bleeding-A-[place]

## 2023-10-16 ENCOUNTER — HOSPITAL ENCOUNTER (INPATIENT)
Facility: CLINIC | Age: 43
LOS: 3 days | Discharge: HOME | DRG: 386 | End: 2023-10-19
Attending: EMERGENCY MEDICINE | Admitting: FAMILY MEDICINE

## 2023-10-16 ENCOUNTER — APPOINTMENT (OUTPATIENT)
Dept: CT IMAGING | Facility: CLINIC | Age: 43
DRG: 386 | End: 2023-10-16
Attending: PHYSICIAN ASSISTANT

## 2023-10-16 DIAGNOSIS — K52.9 COLITIS: ICD-10-CM

## 2023-10-16 DIAGNOSIS — F41.1 ANXIETY STATE: ICD-10-CM

## 2023-10-16 DIAGNOSIS — K51.911 ACUTE ULCERATIVE COLITIS WITH RECTAL BLEEDING (H): Primary | ICD-10-CM

## 2023-10-16 PROBLEM — K51.90 ACUTE ULCERATIVE COLITIS (H): Status: ACTIVE | Noted: 2023-10-16

## 2023-10-16 LAB
ABO/RH(D): NORMAL
ALBUMIN SERPL BCG-MCNC: 4.1 G/DL (ref 3.5–5.2)
ALP SERPL-CCNC: 82 U/L (ref 35–104)
ALT SERPL W P-5'-P-CCNC: 15 U/L (ref 0–50)
ANION GAP SERPL CALCULATED.3IONS-SCNC: 11 MMOL/L (ref 7–15)
ANTIBODY SCREEN: NEGATIVE
AST SERPL W P-5'-P-CCNC: 14 U/L (ref 0–45)
BASO+EOS+MONOS # BLD AUTO: ABNORMAL 10*3/UL
BASO+EOS+MONOS NFR BLD AUTO: ABNORMAL %
BASOPHILS # BLD AUTO: 0 10E3/UL (ref 0–0.2)
BASOPHILS NFR BLD AUTO: 0 %
BILIRUB DIRECT SERPL-MCNC: <0.2 MG/DL (ref 0–0.3)
BILIRUB SERPL-MCNC: <0.2 MG/DL
BLD PROD TYP BPU: NORMAL
BLOOD COMPONENT TYPE: NORMAL
BUN SERPL-MCNC: 23.3 MG/DL (ref 6–20)
CALCIUM SERPL-MCNC: 9.4 MG/DL (ref 8.6–10)
CHLORIDE SERPL-SCNC: 99 MMOL/L (ref 98–107)
CODING SYSTEM: NORMAL
CREAT SERPL-MCNC: 0.81 MG/DL (ref 0.51–0.95)
CROSSMATCH: NORMAL
DEPRECATED HCO3 PLAS-SCNC: 27 MMOL/L (ref 22–29)
EGFRCR SERPLBLD CKD-EPI 2021: >90 ML/MIN/1.73M2
EOSINOPHIL # BLD AUTO: 0 10E3/UL (ref 0–0.7)
EOSINOPHIL NFR BLD AUTO: 0 %
ERYTHROCYTE [DISTWIDTH] IN BLOOD BY AUTOMATED COUNT: 17.4 % (ref 10–15)
GLUCOSE SERPL-MCNC: 126 MG/DL (ref 70–99)
HBV CORE AB SERPL QL IA: NONREACTIVE
HBV SURFACE AB SERPL IA-ACNC: 0.35 M[IU]/ML
HBV SURFACE AB SERPL IA-ACNC: NONREACTIVE M[IU]/ML
HBV SURFACE AG SERPL QL IA: NONREACTIVE
HCG SERPL QL: NEGATIVE
HCT VFR BLD AUTO: 26.9 % (ref 35–47)
HGB BLD-MCNC: 6.9 G/DL (ref 11.7–15.7)
HGB BLD-MCNC: 7.7 G/DL (ref 11.7–15.7)
IMM GRANULOCYTES # BLD: 0.4 10E3/UL
IMM GRANULOCYTES NFR BLD: 3 %
IRON BINDING CAPACITY (ROCHE): 374 UG/DL (ref 240–430)
IRON SATN MFR SERPL: 3 % (ref 15–46)
IRON SERPL-MCNC: 12 UG/DL (ref 37–145)
ISSUE DATE AND TIME: NORMAL
LACTATE SERPL-SCNC: 1.1 MMOL/L (ref 0.7–2)
LIPASE SERPL-CCNC: 48 U/L (ref 13–60)
LYMPHOCYTES # BLD AUTO: 1.8 10E3/UL (ref 0.8–5.3)
LYMPHOCYTES NFR BLD AUTO: 14 %
MCH RBC QN AUTO: 21.6 PG (ref 26.5–33)
MCHC RBC AUTO-ENTMCNC: 28.6 G/DL (ref 31.5–36.5)
MCV RBC AUTO: 76 FL (ref 78–100)
MONOCYTES # BLD AUTO: 0.9 10E3/UL (ref 0–1.3)
MONOCYTES NFR BLD AUTO: 7 %
NEUTROPHILS # BLD AUTO: 10 10E3/UL (ref 1.6–8.3)
NEUTROPHILS NFR BLD AUTO: 76 %
NRBC # BLD AUTO: 0.1 10E3/UL
NRBC BLD AUTO-RTO: 1 /100
PLATELET # BLD AUTO: 600 10E3/UL (ref 150–450)
POTASSIUM SERPL-SCNC: 4 MMOL/L (ref 3.4–5.3)
PROT SERPL-MCNC: 7.2 G/DL (ref 6.4–8.3)
RBC # BLD AUTO: 3.56 10E6/UL (ref 3.8–5.2)
SODIUM SERPL-SCNC: 137 MMOL/L (ref 135–145)
SPECIMEN EXPIRATION DATE: NORMAL
UNIT ABO/RH: NORMAL
UNIT NUMBER: NORMAL
UNIT STATUS: NORMAL
UNIT TYPE ISBT: 7300
WBC # BLD AUTO: 13.2 10E3/UL (ref 4–11)

## 2023-10-16 PROCEDURE — 36415 COLL VENOUS BLD VENIPUNCTURE: CPT | Performed by: PHYSICIAN ASSISTANT

## 2023-10-16 PROCEDURE — 250N000013 HC RX MED GY IP 250 OP 250 PS 637: Performed by: PHYSICIAN ASSISTANT

## 2023-10-16 PROCEDURE — 86704 HEP B CORE ANTIBODY TOTAL: CPT | Performed by: PHYSICIAN ASSISTANT

## 2023-10-16 PROCEDURE — 250N000013 HC RX MED GY IP 250 OP 250 PS 637: Performed by: FAMILY MEDICINE

## 2023-10-16 PROCEDURE — 99223 1ST HOSP IP/OBS HIGH 75: CPT | Mod: AI | Performed by: FAMILY MEDICINE

## 2023-10-16 PROCEDURE — 250N000011 HC RX IP 250 OP 636: Mod: JZ | Performed by: EMERGENCY MEDICINE

## 2023-10-16 PROCEDURE — 96375 TX/PRO/DX INJ NEW DRUG ADDON: CPT

## 2023-10-16 PROCEDURE — 36415 COLL VENOUS BLD VENIPUNCTURE: CPT | Performed by: FAMILY MEDICINE

## 2023-10-16 PROCEDURE — 99285 EMERGENCY DEPT VISIT HI MDM: CPT | Mod: 25

## 2023-10-16 PROCEDURE — 87340 HEPATITIS B SURFACE AG IA: CPT | Performed by: PHYSICIAN ASSISTANT

## 2023-10-16 PROCEDURE — 84703 CHORIONIC GONADOTROPIN ASSAY: CPT | Performed by: PHYSICIAN ASSISTANT

## 2023-10-16 PROCEDURE — 80053 COMPREHEN METABOLIC PANEL: CPT | Performed by: PHYSICIAN ASSISTANT

## 2023-10-16 PROCEDURE — 250N000011 HC RX IP 250 OP 636: Performed by: PHYSICIAN ASSISTANT

## 2023-10-16 PROCEDURE — 258N000003 HC RX IP 258 OP 636: Performed by: PHYSICIAN ASSISTANT

## 2023-10-16 PROCEDURE — 86481 TB AG RESPONSE T-CELL SUSP: CPT | Performed by: PHYSICIAN ASSISTANT

## 2023-10-16 PROCEDURE — 86901 BLOOD TYPING SEROLOGIC RH(D): CPT | Performed by: PHYSICIAN ASSISTANT

## 2023-10-16 PROCEDURE — 83605 ASSAY OF LACTIC ACID: CPT | Performed by: PHYSICIAN ASSISTANT

## 2023-10-16 PROCEDURE — 74174 CTA ABD&PLVS W/CONTRAST: CPT

## 2023-10-16 PROCEDURE — 96361 HYDRATE IV INFUSION ADD-ON: CPT

## 2023-10-16 PROCEDURE — 258N000003 HC RX IP 258 OP 636: Performed by: FAMILY MEDICINE

## 2023-10-16 PROCEDURE — 85018 HEMOGLOBIN: CPT | Performed by: FAMILY MEDICINE

## 2023-10-16 PROCEDURE — 85025 COMPLETE CBC W/AUTO DIFF WBC: CPT | Performed by: PHYSICIAN ASSISTANT

## 2023-10-16 PROCEDURE — 86706 HEP B SURFACE ANTIBODY: CPT | Performed by: PHYSICIAN ASSISTANT

## 2023-10-16 PROCEDURE — 83550 IRON BINDING TEST: CPT | Performed by: PHYSICIAN ASSISTANT

## 2023-10-16 PROCEDURE — 82248 BILIRUBIN DIRECT: CPT | Performed by: PHYSICIAN ASSISTANT

## 2023-10-16 PROCEDURE — 250N000011 HC RX IP 250 OP 636: Performed by: EMERGENCY MEDICINE

## 2023-10-16 PROCEDURE — 120N000001 HC R&B MED SURG/OB

## 2023-10-16 PROCEDURE — 96374 THER/PROPH/DIAG INJ IV PUSH: CPT

## 2023-10-16 PROCEDURE — 86922 COMPATIBILITY TEST ANTIGLOB: CPT | Performed by: STUDENT IN AN ORGANIZED HEALTH CARE EDUCATION/TRAINING PROGRAM

## 2023-10-16 PROCEDURE — 83690 ASSAY OF LIPASE: CPT | Performed by: PHYSICIAN ASSISTANT

## 2023-10-16 RX ORDER — ONDANSETRON 4 MG/1
4 TABLET, ORALLY DISINTEGRATING ORAL EVERY 6 HOURS PRN
Status: DISCONTINUED | OUTPATIENT
Start: 2023-10-16 | End: 2023-10-19 | Stop reason: HOSPADM

## 2023-10-16 RX ORDER — ACETAMINOPHEN 325 MG/1
650 TABLET ORAL EVERY 6 HOURS PRN
Status: DISCONTINUED | OUTPATIENT
Start: 2023-10-16 | End: 2023-10-19 | Stop reason: HOSPADM

## 2023-10-16 RX ORDER — ONDANSETRON 2 MG/ML
4 INJECTION INTRAMUSCULAR; INTRAVENOUS ONCE
Status: COMPLETED | OUTPATIENT
Start: 2023-10-16 | End: 2023-10-16

## 2023-10-16 RX ORDER — SODIUM CHLORIDE 9 MG/ML
INJECTION, SOLUTION INTRAVENOUS CONTINUOUS
Status: DISCONTINUED | OUTPATIENT
Start: 2023-10-16 | End: 2023-10-17

## 2023-10-16 RX ORDER — ACETAMINOPHEN 650 MG/1
650 SUPPOSITORY RECTAL EVERY 6 HOURS PRN
Status: DISCONTINUED | OUTPATIENT
Start: 2023-10-16 | End: 2023-10-19 | Stop reason: HOSPADM

## 2023-10-16 RX ORDER — IOPAMIDOL 755 MG/ML
100 INJECTION, SOLUTION INTRAVASCULAR ONCE
Status: COMPLETED | OUTPATIENT
Start: 2023-10-16 | End: 2023-10-16

## 2023-10-16 RX ORDER — MORPHINE SULFATE 4 MG/ML
4 INJECTION, SOLUTION INTRAMUSCULAR; INTRAVENOUS ONCE
Status: COMPLETED | OUTPATIENT
Start: 2023-10-16 | End: 2023-10-16

## 2023-10-16 RX ORDER — ONDANSETRON 2 MG/ML
4 INJECTION INTRAMUSCULAR; INTRAVENOUS EVERY 6 HOURS PRN
Status: DISCONTINUED | OUTPATIENT
Start: 2023-10-16 | End: 2023-10-19 | Stop reason: HOSPADM

## 2023-10-16 RX ORDER — METHYLPREDNISOLONE SODIUM SUCCINATE 40 MG/ML
20 INJECTION, POWDER, LYOPHILIZED, FOR SOLUTION INTRAMUSCULAR; INTRAVENOUS EVERY 8 HOURS
Qty: 6 ML | Refills: 0 | Status: COMPLETED | OUTPATIENT
Start: 2023-10-16 | End: 2023-10-18

## 2023-10-16 RX ORDER — MESALAMINE 800 MG/1
1600 TABLET, DELAYED RELEASE ORAL 3 TIMES DAILY
Status: DISCONTINUED | OUTPATIENT
Start: 2023-10-16 | End: 2023-10-19 | Stop reason: HOSPADM

## 2023-10-16 RX ADMIN — METHYLPREDNISOLONE SODIUM SUCCINATE 20 MG: 40 INJECTION, POWDER, FOR SOLUTION INTRAMUSCULAR; INTRAVENOUS at 13:21

## 2023-10-16 RX ADMIN — IOPAMIDOL 100 ML: 755 INJECTION, SOLUTION INTRAVENOUS at 11:55

## 2023-10-16 RX ADMIN — ACETAMINOPHEN 650 MG: 325 TABLET ORAL at 21:11

## 2023-10-16 RX ADMIN — METHYLPREDNISOLONE SODIUM SUCCINATE 20 MG: 40 INJECTION, POWDER, FOR SOLUTION INTRAMUSCULAR; INTRAVENOUS at 21:11

## 2023-10-16 RX ADMIN — ONDANSETRON 4 MG: 2 INJECTION INTRAMUSCULAR; INTRAVENOUS at 11:37

## 2023-10-16 RX ADMIN — MORPHINE SULFATE 4 MG: 4 INJECTION, SOLUTION INTRAMUSCULAR; INTRAVENOUS at 11:38

## 2023-10-16 RX ADMIN — SODIUM CHLORIDE 1000 ML: 9 INJECTION, SOLUTION INTRAVENOUS at 10:24

## 2023-10-16 RX ADMIN — MESALAMINE 1600 MG: 800 TABLET, DELAYED RELEASE ORAL at 21:11

## 2023-10-16 RX ADMIN — MESALAMINE 1600 MG: 800 TABLET, DELAYED RELEASE ORAL at 15:40

## 2023-10-16 RX ADMIN — SODIUM CHLORIDE: 9 INJECTION, SOLUTION INTRAVENOUS at 14:35

## 2023-10-16 ASSESSMENT — ACTIVITIES OF DAILY LIVING (ADL)
ADLS_ACUITY_SCORE: 35
DEPENDENT_IADLS:: INDEPENDENT
ADLS_ACUITY_SCORE: 35
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
## 2023-10-16 NOTE — PROGRESS NOTES
"Care Management Follow Up    Length of Stay (days): 0    Expected Discharge Date: 10/17/2023     Concerns to be Addressed: GI consult today, labs, IV steroid    Patient plan of care discussed at interdisciplinary rounds: CM reviewed all ER notes. Awaiting transfer to floor for IP admission.     Anticipated Discharge Disposition: Home  Disposition Comments: Will D/C home; no CM needs. Plans to drive self home (car in ER parking lot).  Anticipated Discharge Services: None  Anticipated Discharge DME: None    Patient/family educated on Medicare website which has current facility and service quality ratings:  (None indicated at this time)  Education Provided on the Discharge Plan: Yes (AVS per bedside RN)  Patient/Family in Agreement with the Plan: yes    Referrals Placed by CM/SW:  (None indicated at this time)  Private pay costs discussed: None discussed at this time. She states having contact information to apply for MA with [facility] and also for the hospital financial counselor from her recent hospital stay as she is in between jobs with insurance changes but states \"it's all OK\".     Additional Information:  Chart reviewed.     CM met with patient. She lives alone. Independent with all cares, including driving and works. No assistive devices. No private duty or skilled HC services.     Denies needs from CM at this time. States plan to return home via self transport (car in ER parking lot).     Nora Hall RN      "

## 2023-10-16 NOTE — PLAN OF CARE
Problem: Bowel Disease, Inflammatory (Ulcerative Colitis or Crohn's Disease)  Goal: Absence of Infection Signs and Symptoms  Outcome: Progressing     Problem: Bowel Disease, Inflammatory (Ulcerative Colitis or Crohn's Disease)  Goal: Diarrhea Symptom Relief  Outcome: Progressing     Patient is A/Ox4, VSS on RA. Pt's speech is hyperactive/fast. Pt wanted to go out to her car to get a bag, RN explained that with the IV it is not safe to do so. Pt verbally gave understanding.  Patient ambulates independently in the room. Voiding adequately., pt reports pain and discomfort during urination.  Pt tolerating a clear liquid diet. No bowl movements this shift. IV infusing NS at 100 ml/hr. Discharge pending medical progression and improvement.     [id number] Scooter Waddell RN

## 2023-10-16 NOTE — PLAN OF CARE
Problem: Adult Inpatient Plan of Care  Goal: Optimal Comfort and Wellbeing  Outcome: Progressing  Intervention: Monitor Pain and Promote Comfort  Recent Flowsheet Documentation  Taken 10/16/2023 1436 by Jasmeet Rodriguez RN  Pain Management Interventions:   care clustered   pain management plan reviewed with patient/caregiver   pillow support provided   repositioned   rest     Problem: Adult Inpatient Plan of Care  Goal: Readiness for Transition of Care  Outcome: Progressing       Patient is A/Ox4 but is hyperactive and talks very fast. VSS on RA. Patient ambulates independently in the room. Voiding adequately. Patient's last bowel movement was this morning and patient reports pain when voiding. Full sensation per Pt. IV infusing at 100ml/hr, patent. No new skin issues noted. Patient rating pain 8/10 during shift, cold therapy, pain medications, rest, and repositioning used to manage pain. Patient tolerating a clear liquid diet. NPO at midnight.
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
## 2023-10-16 NOTE — PHARMACY-ADMISSION MEDICATION HISTORY
Pharmacy Intern Admission Medication History    Admission medication history is complete. The information provided in this note is only as accurate as the sources available at the time of the update.    Information Source(s): Patient and CareEverywhere/SureScripts via in-person    Pertinent Information: Patient stated taking prednisone on 10/09/23.    Changes made to PTA medication list:  Added: None  Deleted: None  Changed: None    Medication Affordability:  Not including over the counter (OTC) medications, was there a time in the past 3 months when you did not take your medications as prescribed because of cost?: No    Allergies reviewed with patient and updates made in EHR: yes    Medication History Completed By: Mely Hoffmann 10/16/2023 11:35 AM    PTA Med List   Medication Sig Last Dose    predniSONE (DELTASONE) 20 MG tablet Take 3 tabs by mouth daily x 3 days, then 2 tabs daily x 3 days, then 1 tab daily x 3 days, then 1/2 tab daily x 3 days. 10/16/2023 at 0800

## 2023-10-16 NOTE — ED TRIAGE NOTES
The patient presents to the ED with abdominal pain and marty bloody diarrhea with clots that has been ongoing for several months. The patient has a history of Crohn's and ulcerative colitis. The patient reports she feels like her abdomen is enflamed. The patienti reports she had bloodwork done a week ago and it showed a low hemoglobin. The patient was instructed to see her GI doctor if symptoms didn't improve but she is unable to get in to them.

## 2023-10-17 LAB
C DIFF TOX B STL QL: NEGATIVE
ERYTHROCYTE [DISTWIDTH] IN BLOOD BY AUTOMATED COUNT: 16.6 % (ref 10–15)
HCT VFR BLD AUTO: 28.9 % (ref 35–47)
HGB BLD-MCNC: 8.7 G/DL (ref 11.7–15.7)
HGB BLD-MCNC: 9.9 G/DL (ref 11.7–15.7)
MCH RBC QN AUTO: 22.5 PG (ref 26.5–33)
MCHC RBC AUTO-ENTMCNC: 30.1 G/DL (ref 31.5–36.5)
MCV RBC AUTO: 75 FL (ref 78–100)
PLATELET # BLD AUTO: 531 10E3/UL (ref 150–450)
RBC # BLD AUTO: 3.86 10E6/UL (ref 3.8–5.2)
WBC # BLD AUTO: 11 10E3/UL (ref 4–11)

## 2023-10-17 PROCEDURE — 36415 COLL VENOUS BLD VENIPUNCTURE: CPT | Performed by: FAMILY MEDICINE

## 2023-10-17 PROCEDURE — 87493 C DIFF AMPLIFIED PROBE: CPT | Performed by: PHYSICIAN ASSISTANT

## 2023-10-17 PROCEDURE — 250N000011 HC RX IP 250 OP 636: Mod: JZ | Performed by: PHYSICIAN ASSISTANT

## 2023-10-17 PROCEDURE — 99233 SBSQ HOSP IP/OBS HIGH 50: CPT | Performed by: FAMILY MEDICINE

## 2023-10-17 PROCEDURE — P9016 RBC LEUKOCYTES REDUCED: HCPCS | Performed by: STUDENT IN AN ORGANIZED HEALTH CARE EDUCATION/TRAINING PROGRAM

## 2023-10-17 PROCEDURE — 250N000013 HC RX MED GY IP 250 OP 250 PS 637: Performed by: PHYSICIAN ASSISTANT

## 2023-10-17 PROCEDURE — 258N000003 HC RX IP 258 OP 636: Performed by: FAMILY MEDICINE

## 2023-10-17 PROCEDURE — 85018 HEMOGLOBIN: CPT | Performed by: FAMILY MEDICINE

## 2023-10-17 PROCEDURE — 258N000003 HC RX IP 258 OP 636: Performed by: PHYSICIAN ASSISTANT

## 2023-10-17 PROCEDURE — 120N000001 HC R&B MED SURG/OB

## 2023-10-17 PROCEDURE — 85027 COMPLETE CBC AUTOMATED: CPT | Performed by: FAMILY MEDICINE

## 2023-10-17 PROCEDURE — 250N000011 HC RX IP 250 OP 636: Performed by: PHYSICIAN ASSISTANT

## 2023-10-17 PROCEDURE — 250N000013 HC RX MED GY IP 250 OP 250 PS 637: Performed by: FAMILY MEDICINE

## 2023-10-17 RX ORDER — DICYCLOMINE HYDROCHLORIDE 10 MG/1
10 CAPSULE ORAL 4 TIMES DAILY
Status: DISCONTINUED | OUTPATIENT
Start: 2023-10-17 | End: 2023-10-19 | Stop reason: HOSPADM

## 2023-10-17 RX ORDER — ESCITALOPRAM OXALATE 10 MG/1
10 TABLET ORAL DAILY
Status: DISCONTINUED | OUTPATIENT
Start: 2023-10-17 | End: 2023-10-19 | Stop reason: HOSPADM

## 2023-10-17 RX ADMIN — METHYLPREDNISOLONE SODIUM SUCCINATE 20 MG: 40 INJECTION, POWDER, FOR SOLUTION INTRAMUSCULAR; INTRAVENOUS at 20:51

## 2023-10-17 RX ADMIN — MESALAMINE 1600 MG: 800 TABLET, DELAYED RELEASE ORAL at 20:07

## 2023-10-17 RX ADMIN — MESALAMINE 1600 MG: 800 TABLET, DELAYED RELEASE ORAL at 14:03

## 2023-10-17 RX ADMIN — IRON SUCROSE 300 MG: 20 INJECTION, SOLUTION INTRAVENOUS at 09:16

## 2023-10-17 RX ADMIN — MESALAMINE 1600 MG: 800 TABLET, DELAYED RELEASE ORAL at 09:16

## 2023-10-17 RX ADMIN — ESCITALOPRAM OXALATE 10 MG: 10 TABLET ORAL at 15:02

## 2023-10-17 RX ADMIN — DICYCLOMINE HYDROCHLORIDE 10 MG: 10 CAPSULE ORAL at 20:51

## 2023-10-17 RX ADMIN — DICYCLOMINE HYDROCHLORIDE 10 MG: 10 CAPSULE ORAL at 15:00

## 2023-10-17 RX ADMIN — METHYLPREDNISOLONE SODIUM SUCCINATE 20 MG: 40 INJECTION, POWDER, FOR SOLUTION INTRAMUSCULAR; INTRAVENOUS at 06:12

## 2023-10-17 RX ADMIN — SODIUM CHLORIDE: 9 INJECTION, SOLUTION INTRAVENOUS at 06:13

## 2023-10-17 RX ADMIN — METHYLPREDNISOLONE SODIUM SUCCINATE 20 MG: 40 INJECTION, POWDER, FOR SOLUTION INTRAMUSCULAR; INTRAVENOUS at 14:04

## 2023-10-17 RX ADMIN — Medication 1 MG: at 20:07

## 2023-10-17 ASSESSMENT — ACTIVITIES OF DAILY LIVING (ADL)
ADLS_ACUITY_SCORE: 20
ADLS_ACUITY_SCORE: 35
ADLS_ACUITY_SCORE: 35
ADLS_ACUITY_SCORE: 20
ADLS_ACUITY_SCORE: 35
ADLS_ACUITY_SCORE: 35

## 2023-10-17 NOTE — PROGRESS NOTES
[facility] MEDICINE  PROGRESS NOTE     Code Status: Full Code       Identification/Summary:   Nicki Mckeon is a 43 year old female with PMH signficant for ulcerative colitis, nephrolithiasis, menorrhagia, TBI, anxiety, ADHD, BPPV.  At [facility] 10/8 tests 10/9 for colitis / GI bleed.  Left AMA.  Hemoglobin at that time 9.3.  Seen by primary and started oral steroids.  10/16 presented with ongoing diarrhea with bright red blood and clots.  Abdominal pain.  Hemoglobin 7.7.  White count 13.2.  CT scan showed active colitis from the proximal descending colon through to the rectum.  Consulted [facility] and initiated on IV steroids.  Hemoglobin down to 6.9.  Received PRBC x1 with hemoglobin improvement.  C. difficile negative.  Receiving Bentyl for abdominal cramping.  Diet advancing.  Possible discharge 1 to 2 days.     Assessment and Plan:     Acute ulcerative colitis  Lower GI bleed  CT scan findings as above.  [facility] consultation appreciated.  Utilizing intravenous steroids per their recommendation.  C. difficile testing negative.  Discontinue enteric precautions.  Clear liquid diet advanced to low fiber diet.  Bentyl for abdominal cramping.  Acute blood loss anemia  PRBC transfusion x1  Admission hemoglobin 7.7.  Patient has had issues with anemia in the past due to bleeding.  Consent obtained for transfusion.  Hemoglobin down to 6.9.  Transfused PRBC x1.  Recheck hemoglobin 8.7.  Following serial hemoglobins every 12 hours.  Transfuse if less than 7.  Leukocytosis  Possibly combination of the colitis as well as the steroid she had had been on before.  Admission white count 13.2 recheck 11.    Follow daily CBC.  Thrombocytosis  Admission platelets 600.  Possibly hemoconcentrated.  Recheck 531.  Follow daily platelet level.  History of TBI  Anxiety/ADHD  Very tearful and anxious.  Agreeable to start selective serotonin reuptake inhibitor- ordered Lexapro 10 mg daily.    "  Anticoagulation   Low Risk/Ambulatory with no VTE prophylaxis indicated     COVID19 PCR screened no respiratory symptoms  Fluids: Saline lock  Pain meds: Tylenol as needed and scheduled Bentyl  Therapy: N/A  [place]:Not present  Lines: None       Current Diet  Orders Placed This Encounter      Low Fiber Diet    Supplements  None    Barriers to Discharge: Abdominal cramping, IV steroids    Disposition: Likely here at least 1-2 more days    Clinically Significant Risk Factors Present on Admission                       # Overweight: Estimated body mass index is 26.17 kg/m  as calculated from the following:    Height as of this encounter: 1.575 m (5' 2\").    Weight as of this encounter: 64.9 kg (143 lb 1.6 oz).              Interval History/Subjective:  Patient having less diarrhea but now having significant abdominal cramping.  Quite tearful.  Rates it as a 9 out of 10.  Was able to eat some breakfast.  States that this is typical pain that she has been dealing with for the last month.  Agreeable to starting a SSRI.  Does not remember the name of which she was on before.  Reviewing electronic chart looks like she was on Lexapro.  Questions answered to verbalized satisfaction.      Last 24H PRN:     acetaminophen (TYLENOL) tablet 650 mg, 650 mg at 10/16/23 2111 **OR** acetaminophen (TYLENOL) Suppository 650 mg    Physical Exam/Objective:  Temp:  [97.2  F (36.2  C)-97.9  F (36.6  C)] 97.8  F (36.6  C)  Pulse:  [] 81  Resp:  [16-18] 16  BP: (116-166)/(72-87) 135/81  SpO2:  [92 %-100 %] 94 %  Wt Readings from Last 4 Encounters:   10/16/23 64.9 kg (143 lb 1.6 oz)   10/09/23 65.5 kg (144 lb 4.8 oz)   10/08/23 59 kg (130 lb)   01/04/23 67.9 kg (149 lb 12.8 oz)     Body mass index is 26.17 kg/m .    Constitutional: awake, alert, cooperative, distracted, mild distress, appears stated age, and normal weight  ENT: Normocephalic, without obvious abnormality, atraumatic, external ears without lesions, oral pharynx with moist " mucous membranes, tonsils without erythema or exudates, gums normal and good dentition.  Respiratory: No increased work of breathing, good air exchange, clear to auscultation bilaterally, no crackles or wheezing  Cardiovascular: Normal apical impulse, regular rate and rhythm, normal S1 and S2, no S3 or S4, and no murmur noted  GI: No scars, normal bowel sounds, soft, non-distended, non-tender, no masses palpated, no hepatosplenomegally  Skin: normal skin color, texture, turgor, no redness, warmth, or swelling, and no rashes  Musculoskeletal: There is no redness, warmth, or swelling of the joints.  Motor strength is 5 out of 5 all extremities bilaterally.  Tone is normal. no lower extremity pitting edema present  Neurologic: Cranial nerves II-XII are grossly intact. Sensory:  Sensory intact  Neuropsychiatric: General: restless and fidgeting Level of consciousness: alert / normal Affect: tearful and sad Orientation: oriented to self, place, time and situation Memory and insight: normal, memory for past and recent events intact, and thought process normal      Medications:   Personally Reviewed.  Medications    sodium chloride 100 mL/hr at 10/17/23 0613      iron sucrose  300 mg Intravenous Every Other Day    mesalamine  1,600 mg Oral TID    methylPREDNISolone  20 mg Intravenous Q8H       Data reviewed today: I personally reviewed all new medications, labs, imaging/diagnostics reports over the past 24 hours. Pertinent findings include:    Imaging:   Recent Results (from the past 24 hour(s))   CTA Abdomen Pelvis with Contrast    Narrative    EXAM: CTA ABDOMEN PELVIS WITH CONTRAST  LOCATION: [facility]  DATE: 10/16/2023    INDICATION: Bloody diarrhea. Lower abdominal pain. History of Crohn's and ulcerative colitis.  COMPARISON: CTA abdomen pelvis 10/08/2023.  TECHNIQUE: CT angiogram abdomen pelvis during arterial phase of injection of IV contrast. 2D and 3D MIP reconstructions were performed by  the CT technologist. Dose reduction techniques were used.  CONTRAST: 90ml Isovue 370    FINDINGS:    ANGIOGRAM ABDOMEN/PELVIS: No evidence of active gastrointestinal bleeding. Nonaneurysmal abdominal aorta. Celiac artery and its branches are patent. Superior mesenteric artery and its visualized branches are patent. Single bilateral renal arteries are   patent. Inferior mesenteric artery is patent. Bilateral iliofemoral arteries are patent. Portal, splenic, and superior mesenteric veins are patent.    LOWER CHEST: Posterior right lower lobe scarring. No consolidations or pleural effusions.    HEPATOBILIARY: Subcentimeter low-attenuation left hepatic lesion is unchanged and likely benign. No suspicious liver lesions. No calcified gallstones or biliary ductal dilation.    PANCREAS: Normal.    SPLEEN: Normal.    ADRENAL GLANDS: Normal.    KIDNEYS/BLADDER: Two adjacent nonobstructing calculi at the right upper pole measuring up to 2 mm. No left renal calculi. Small simple left renal cyst, which does not require follow-up. No hydronephrosis. Normal bladder.    BOWEL: Active inflammation of the distal colon extending from the proximal descending colon through the rectum, characterized by mild circumferential wall thickening, mural hyperenhancement, and engorgement of the surrounding pericolonic vascular   arcades. The overall severity and extent of inflammation has mildly improved since 10/08/2023, particularly within the transverse colon. No pneumatosis or colonic dilation. No bowel obstruction. Normal appendix. No evidence of active small bowel   inflammation. No perianal inflammation or fluid collections.    LYMPH NODES: Scattered prominent but nonenlarged pericolonic lymph nodes, likely reactive. No enlarged abdominal or pelvic lymph nodes.    PELVIC ORGANS: Retroverted uterus. No adnexal mass.    MUSCULOSKELETAL: Tiny fat-containing periumbilical hernia. Degenerative disc changes at L5-S1. No acute bony abnormality.       Impression    IMPRESSION:    1.  No evidence of active gastrointestinal bleeding.     2.  Active colitis extending from the proximal descending colon through the rectum, overall mildly improved in severity and extent since 10/08/2023, particularly within the transverse colon.    3.  No new segments of active bowel inflammation.       Labs:  CTA Abdomen Pelvis with Contrast   Final Result   IMPRESSION:      1.  No evidence of active gastrointestinal bleeding.       2.  Active colitis extending from the proximal descending colon through the rectum, overall mildly improved in severity and extent since 10/08/2023, particularly within the transverse colon.      3.  No new segments of active bowel inflammation.        Recent Results (from the past 24 hour(s))   Basic metabolic panel    Collection Time: 10/16/23 10:19 AM   Result Value Ref Range    Sodium 137 135 - 145 mmol/L    Potassium 4.0 3.4 - 5.3 mmol/L    Chloride 99 98 - 107 mmol/L    Carbon Dioxide (CO2) 27 22 - 29 mmol/L    Anion Gap 11 7 - 15 mmol/L    Urea Nitrogen 23.3 (H) 6.0 - 20.0 mg/dL    Creatinine 0.81 0.51 - 0.95 mg/dL    GFR Estimate >90 >60 mL/min/1.73m2    Calcium 9.4 8.6 - 10.0 mg/dL    Glucose 126 (H) 70 - 99 mg/dL   Lactic acid whole blood    Collection Time: 10/16/23 10:19 AM   Result Value Ref Range    Lactic Acid 1.1 0.7 - 2.0 mmol/L   Hepatic function panel    Collection Time: 10/16/23 10:19 AM   Result Value Ref Range    Protein Total 7.2 6.4 - 8.3 g/dL    Albumin 4.1 3.5 - 5.2 g/dL    Bilirubin Total <0.2 <=1.2 mg/dL    Alkaline Phosphatase 82 35 - 104 U/L    AST 14 0 - 45 U/L    ALT 15 0 - 50 U/L    Bilirubin Direct <0.20 0.00 - 0.30 mg/dL   Lipase    Collection Time: 10/16/23 10:19 AM   Result Value Ref Range    Lipase 48 13 - 60 U/L   HCG qualitative    Collection Time: 10/16/23 10:19 AM   Result Value Ref Range    hCG Serum Qualitative Negative Negative   CBC with platelets and differential    Collection Time: 10/16/23 10:19 AM    Result Value Ref Range    WBC Count 13.2 (H) 4.0 - 11.0 10e3/uL    RBC Count 3.56 (L) 3.80 - 5.20 10e6/uL    Hemoglobin 7.7 (L) 11.7 - 15.7 g/dL    Hematocrit 26.9 (L) 35.0 - 47.0 %    MCV 76 (L) 78 - 100 fL    MCH 21.6 (L) 26.5 - 33.0 pg    MCHC 28.6 (L) 31.5 - 36.5 g/dL    RDW 17.4 (H) 10.0 - 15.0 %    Platelet Count 600 (H) 150 - 450 10e3/uL    % Neutrophils 76 %    % Lymphocytes 14 %    % Monocytes 7 %    Mids % (Monos, Eos, Basos)      % Eosinophils 0 %    % Basophils 0 %    % Immature Granulocytes 3 %    NRBCs per 100 WBC 1 (H) <1 /100    Absolute Neutrophils 10.0 (H) 1.6 - 8.3 10e3/uL    Absolute Lymphocytes 1.8 0.8 - 5.3 10e3/uL    Absolute Monocytes 0.9 0.0 - 1.3 10e3/uL    Mids Abs (Monos, Eos, Basos)      Absolute Eosinophils 0.0 0.0 - 0.7 10e3/uL    Absolute Basophils 0.0 0.0 - 0.2 10e3/uL    Absolute Immature Granulocytes 0.4 <=0.4 10e3/uL    Absolute NRBCs 0.1 10e3/uL   Adult Type and Screen    Collection Time: 10/16/23 10:19 AM   Result Value Ref Range    ABO/RH(D) B POS     Antibody Screen Negative Negative    SPECIMEN EXPIRATION DATE [id number]    Iron and iron binding capacity    Collection Time: 10/16/23 10:19 AM   Result Value Ref Range    Iron 12 (L) 37 - 145 ug/dL    Iron Binding Capacity 374 240 - 430 ug/dL    Iron Sat Index 3 (L) 15 - 46 %   Hepatitis B core antibody    Collection Time: 10/16/23  2:19 PM   Result Value Ref Range    Hepatitis B Core Antibody Total Nonreactive Nonreactive   Hepatitis B Surface Antibody    Collection Time: 10/16/23  2:19 PM   Result Value Ref Range    Hepatitis B Surface Antibody Instrument Value 0.35 <8.00 m[IU]/mL    Hepatitis B Surface Antibody Nonreactive    Hepatitis B surface antigen    Collection Time: 10/16/23  2:19 PM   Result Value Ref Range    Hepatitis B Surface Antigen Nonreactive Nonreactive   Hemoglobin    Collection Time: 10/16/23 10:00 PM   Result Value Ref Range    Hemoglobin 6.9 (LL) 11.7 - 15.7 g/dL   Prepare red blood cells (unit)     Collection Time: 10/16/23 10:40 PM   Result Value Ref Range    Blood Component Type Red Blood Cells     Product Code [id number]     Unit Status Transfused     Unit Number [id number]     CROSSMATCH COMPATIBLE     CODING SYSTEM [id number]     ISSUE DATE AND TIME [id number]     UNIT ABO/RH B+     UNIT TYPE ISBT 7300    CBC with platelets    Collection Time: 10/17/23  6:20 AM   Result Value Ref Range    WBC Count 11.0 4.0 - 11.0 10e3/uL    RBC Count 3.86 3.80 - 5.20 10e6/uL    Hemoglobin 8.7 (L) 11.7 - 15.7 g/dL    Hematocrit 28.9 (L) 35.0 - 47.0 %    MCV 75 (L) 78 - 100 fL    MCH 22.5 (L) 26.5 - 33.0 pg    MCHC 30.1 (L) 31.5 - 36.5 g/dL    RDW 16.6 (H) 10.0 - 15.0 %    Platelet Count 531 (H) 150 - 450 10e3/uL       Pending Labs:  Unresulted Labs Ordered in the Past 30 Days of this Admission       Date and Time Order Name Status Description    10/16/2023  1:15 PM Quantiferon TB Gold Plus Purple Tube In process     10/16/2023  1:15 PM Quantiferon TB Gold Plus Yellow Tube In process     10/16/2023  1:15 PM Quantiferon TB Gold Plus Green Tube In process     10/16/2023  1:15 PM Quantiferon TB Gold Plus Grey Tube In process     10/16/2023  9:20 AM C. difficile Toxin B PCR with reflex to C. difficile Antigen and Toxins A/B EIA In process               Mauricio Garcia MD  [facility]  Phone: #[phone]

## 2023-10-17 NOTE — PLAN OF CARE
Problem: Adult Inpatient Plan of Care  Goal: Absence of Hospital-Acquired Illness or Injury  Intervention: Identify and Manage Fall Risk  Recent Flowsheet Documentation  Taken 10/16/2023 2030 by Yohana Harper RN  Safety Promotion/Fall Prevention:   activity supervised   assistive device/personal items within reach   clutter free environment maintained   increased rounding and observation   nonskid shoes/slippers when out of bed   room near nurse's station   room organization consistent   Goal Outcome Evaluation:      Plan of Care Reviewed With: patient    Overall Patient Progress: improvingOverall Patient Progress: improving pt remains stable on room air, calls appropriately, independent, fully continent, lab called with a hgb of 6.9, orders in for one unit of transfusion. Stool sample sent for C Difficile rule out. Pt NPO  from midnight  for ?  scoop today.

## 2023-10-17 NOTE — PLAN OF CARE
"Pt still having cramping when using the BR per pt PRN Tylenol not very helpful. Bently added to regime to see if that helps. Pt also started on Lexapro. IV iron infusion given this AM. Hgb check this AM was 8.7 after blood was given on NOC shift another Hbg will be check at 1800. Still having stools but per pt \" are more formed, and still some blood noted\". GI to follow up with pt outpatient. IV fluids discontinued per pt request. Tolerating oral intake.   Ayanna Hammond RN on 10/17/2023 at 5:12 PM     Problem: Adult Inpatient Plan of Care  Goal: Absence of Hospital-Acquired Illness or Injury  Intervention: Identify and Manage Fall Risk  Recent Flowsheet Documentation  Taken 10/17/2023 1500 by Ayanna Hammond RN  Safety Promotion/Fall Prevention:   activity supervised   toileting scheduled   supervised activity   safety round/check completed  Taken 10/17/2023 0900 by Ayanna Hammond RN  Safety Promotion/Fall Prevention:   activity supervised   toileting scheduled   supervised activity   safety round/check completed  Intervention: Prevent Infection  Recent Flowsheet Documentation  Taken 10/17/2023 1500 by Ayanna Hammond RN  Infection Prevention: hand hygiene promoted  Taken 10/17/2023 0900 by Ayanna Hammond RN  Infection Prevention: hand hygiene promoted  Goal: Readiness for Transition of Care  Recent Flowsheet Documentation  Taken 10/17/2023 0700 by Ayanna Hammond RN  Transportation Anticipated: family or friend will provide  Intervention: Mutually Develop Transition Plan  Recent Flowsheet Documentation  Taken 10/17/2023 0700 by Ayanna Hammond RN  Transportation Anticipated: family or friend will provide  Patient/Family Anticipates Transition to: home with family  Equipment Currently Used at Home: none     Problem: Bowel Disease, Inflammatory (Ulcerative Colitis or Crohn's Disease)  Goal: Absence of Infection Signs and Symptoms  Outcome: Progressing  Intervention: Prevent or Manage " Infection  Recent Flowsheet Documentation  Taken 10/17/2023 1500 by Ayanna Hammond, RN  Isolation Precautions: enteric precautions maintained  Taken 10/17/2023 0900 by Ayanna Hammond, RN  Isolation Precautions: enteric precautions maintained   Goal Outcome Evaluation:

## 2023-10-17 NOTE — PROGRESS NOTES
"GI PROGRESS NOTE  10/17/2023  Nicki Mckeon  1980  [id number]    Subjective:   Still having several abdominal pain with bowel movements.      Objective:     Blood pressure 136/82, pulse 85, temperature 97.9  F (36.6  C), temperature source Oral, resp. rate 16, height 1.575 m (5' 2\"), weight 64.9 kg (143 lb 1.6 oz), last menstrual period 09/09/2023, SpO2 94%.    Body mass index is 26.17 kg/m .  Gen: NAD  Cardio: RRR  GI: Non-distended, BS positive, soft, mild diffuse tenderness  Neuro: Alert and orientated  Skin: No jaundice     Laboratory:  BMP  Recent Labs   Lab 10/16/23  1019      POTASSIUM 4.0   CHLORIDE 99   MANUEL 9.4   CO2 27   BUN 23.3*   CR 0.81   *     CBC  Recent Labs   Lab 10/17/23  0620 10/16/23  2200 10/16/23  1019   WBC 11.0  --  13.2*   RBC 3.86  --  3.56*   HGB 8.7* 6.9* 7.7*   HCT 28.9*  --  26.9*   MCV 75*  --  76*   MCH 22.5*  --  21.6*   MCHC 30.1*  --  28.6*   RDW 16.6*  --  17.4*   *  --  600*     INRNo lab results found in last 7 days.   LFTs  Recent Labs   Lab Test 10/16/23  1019 10/09/23  0017 10/08/23  2122 01/04/23  1658 07/24/22  0822 02/14/19  1233 02/14/19  1233   ALBUMIN 4.1  --  3.6 3.6   < >  --  3.5   BILITOTAL <0.2  --  <0.2 0.2   < >  --  0.3   ALT 15  --  10 14   < >  --  19   AST 14 --  22 23   < >  --  18   PROTEIN  --  70*  --   --   --  100 mg/dL*  --    LIPASE 48  --  42  --   --   --  30    < > = values in this interval not displayed.     Procedures:  EGD 3/13/23:  Findings:       The examined esophagus was normal.        The entire examined stomach was normal. Biopsies were taken with a cold        forceps for histology.        The examined duodenum was normal. Biopsies were taken with a cold        forceps for histology.      Colonoscopy 3/13/23:  Impressions/Post-Op Diagnosis:        - The examined portion of the ileum was normal.        - The transverse colon, hepatic flexure, ascending colon and cecum are        normal.        - Left-sided " colitis. Inflammation was found from the anus to the        splenic flexure. This was mild in severity. Biopsied.        - Non-bleeding internal hemorrhoids.   A) DUODENUM, BIOPSY:   1. Normal duodenal mucosa   2. Negative for celiac disease and other enteropathy     B) STOMACH, BIOPSY:   1. Normal gastric antral and body mucosae   2. Negative for Helicobacter     C) COLON, LEFT, BIOPSY:   1. Markedly active chronic colitis consistent with inflammatory bowel disease (see comment)   2. Negative for dysplasia   3. CMV immunohistochemistry: Negative      Colonoscopy 7/25/23:  Findings:        The visualized terminal ileum appeared normal.        There was moderate inflammation in the rectosigmoid colon extending to        approximately 30 cm from anal verge consisting or erythema, granularity,        friabilty, small ulcers and erosions with adherent mucopus, and loss of        the vascular pattern. There were two small patches of small erosions and        small ulcers in cecum, two small patches of small erosions and small        ulcers in the proximal ascending colon, and two small patches of small        erosions and small ulcers in proximal transverse colon. The remainder of        colonic mucosa appeared normal. Biopsies taken from inflamed areas as        well as from normal appearing colon.      A) COLON, CECUM, BIOPSY:  -COLON WITH MILD ACTIVE COLITIS  -NEGATIVE FOR DYSPLASIA    B) COLON, ASCENDING, PROXIMAL, BIOPSY:  -COLON WITH MILD ACTIVE COLITIS  -NEGATIVE FOR DYSPLASIA     C) COLON, TRANSVERSE, PROXIMAL, BIOPSY:  -COLON WITH MILD ACTIVE COLITIS  -NEGATIVE FOR DYSPLASIA     D) COLON, TRANSVERSE, DISTAL, BIOPSY:  -COLONIC MUCOSA WITHOUT DIAGNOSTIC NORMALITY    E) COLON, DESCENDING, BIOPSY:  -COLONIC MUCOSA WITHOUT DIAGNOSTIC NORMALITY     F) COLON, SIGMOID/RECTAL, BIOPSY:  -MILD TO SEVERE ACTIVE CHRONIC COLITIS WITHOUT GRANULOMAS OR SPECIFIC FEATURES  -NEGATIVE FOR DYSPLASIA  -SEE COMMENT  Imaging:  EXAM:  CTA ABDOMEN PELVIS WITH CONTRAST  LOCATION: [facility]  DATE: 10/16/2023                                        IMPRESSION:     1.  No evidence of active gastrointestinal bleeding.   2.  Active colitis extending from the proximal descending colon through the rectum, overall mildly improved in severity and extent since 10/08/2023, particularly within the transverse colon.  3.  No new segments of active bowel inflammation.     Assessment:   Left sided ulcerative colitis   43 year old female with history of left-sided ulcerative colitis who has never been on maintenance therapy.  She is now admitted with recurrent diarrhea and hematochezia consistent with UC flare, with active left-sided inflammation seen on CT scan.  She was recently admitted but left AMA for similar reasons, and did start a short oral prednisone taper prescribed by her primary 10/9/2023.  This is likely a flare of her untreated ulcerative colitis. C. Diff pending. Given ongoing pain, will continue IV steroids today and mesalamine, consider transition to oral tomorrow. No plans for scope today. Plan for follow-up in our IBD clinic to discuss long-term maintenance medications and follow-up.    2. Iron deficiency Anemia  Likely due to ongoing blood loss from GI tract, also history of menorrhagia. Received 1 unit PRBCs yesterday due to hemoglobin of 6.9, now 8.7 today. Iron/iron sats low, will give IV iron infusion as well.      Plan:   1. IV Venofer every other day for max 5 doses  2. Continue IV solumedrol  3. Continue mesalamine  4. Low fiber diet         21min minutes of total time was spent providing patient care, including patient evaluation, reviewing documentation/test results, , and documentation.     ADDENDUM: Went back to see patient at 1330 to check on pain. She reports ongoing abdominal pain. I offered to do a rectal/perianal exam since previously she had reported severe pain with bowel movements and I  was concerned for fissure/abscess etc. She declined.                                                                        Fern York PA-C  Thank you for the opportunity to participate in the care of this patient.   Please feel free to call me with any questions or concerns.  Phone number [phone].

## 2023-10-18 LAB
CRP SERPL-MCNC: 12 MG/L
ERYTHROCYTE [DISTWIDTH] IN BLOOD BY AUTOMATED COUNT: 17.2 % (ref 10–15)
GAMMA INTERFERON BACKGROUND BLD IA-ACNC: 0 IU/ML
HCT VFR BLD AUTO: 31.9 % (ref 35–47)
HGB BLD-MCNC: 9.6 G/DL (ref 11.7–15.7)
M TB IFN-G BLD-IMP: NEGATIVE
M TB IFN-G CD4+ BCKGRND COR BLD-ACNC: 3.86 IU/ML
MCH RBC QN AUTO: 23 PG (ref 26.5–33)
MCHC RBC AUTO-ENTMCNC: 30.1 G/DL (ref 31.5–36.5)
MCV RBC AUTO: 77 FL (ref 78–100)
MITOGEN IGNF BCKGRD COR BLD-ACNC: 0 IU/ML
MITOGEN IGNF BCKGRD COR BLD-ACNC: 0 IU/ML
PLATELET # BLD AUTO: 559 10E3/UL (ref 150–450)
QUANTIFERON MITOGEN: 3.86 IU/ML
QUANTIFERON NIL TUBE: 0 IU/ML
QUANTIFERON TB1 TUBE: 0 IU/ML
QUANTIFERON TB2 TUBE: 0
RBC # BLD AUTO: 4.17 10E6/UL (ref 3.8–5.2)
WBC # BLD AUTO: 17 10E3/UL (ref 4–11)

## 2023-10-18 PROCEDURE — 120N000001 HC R&B MED SURG/OB

## 2023-10-18 PROCEDURE — 250N000011 HC RX IP 250 OP 636: Performed by: PHYSICIAN ASSISTANT

## 2023-10-18 PROCEDURE — 250N000013 HC RX MED GY IP 250 OP 250 PS 637: Performed by: FAMILY MEDICINE

## 2023-10-18 PROCEDURE — 36415 COLL VENOUS BLD VENIPUNCTURE: CPT | Performed by: FAMILY MEDICINE

## 2023-10-18 PROCEDURE — 86140 C-REACTIVE PROTEIN: CPT | Performed by: PHYSICIAN ASSISTANT

## 2023-10-18 PROCEDURE — 99232 SBSQ HOSP IP/OBS MODERATE 35: CPT | Performed by: FAMILY MEDICINE

## 2023-10-18 PROCEDURE — 36415 COLL VENOUS BLD VENIPUNCTURE: CPT | Performed by: PHYSICIAN ASSISTANT

## 2023-10-18 PROCEDURE — 85014 HEMATOCRIT: CPT | Performed by: FAMILY MEDICINE

## 2023-10-18 PROCEDURE — 250N000013 HC RX MED GY IP 250 OP 250 PS 637: Performed by: PHYSICIAN ASSISTANT

## 2023-10-18 PROCEDURE — 250N000012 HC RX MED GY IP 250 OP 636 PS 637: Performed by: PHYSICIAN ASSISTANT

## 2023-10-18 RX ORDER — NALOXONE HYDROCHLORIDE 0.4 MG/ML
0.2 INJECTION, SOLUTION INTRAMUSCULAR; INTRAVENOUS; SUBCUTANEOUS
Status: DISCONTINUED | OUTPATIENT
Start: 2023-10-18 | End: 2023-10-19 | Stop reason: HOSPADM

## 2023-10-18 RX ORDER — NALOXONE HYDROCHLORIDE 0.4 MG/ML
0.4 INJECTION, SOLUTION INTRAMUSCULAR; INTRAVENOUS; SUBCUTANEOUS
Status: DISCONTINUED | OUTPATIENT
Start: 2023-10-18 | End: 2023-10-19 | Stop reason: HOSPADM

## 2023-10-18 RX ORDER — OXYCODONE HYDROCHLORIDE 5 MG/1
5 TABLET ORAL EVERY 6 HOURS PRN
Status: DISCONTINUED | OUTPATIENT
Start: 2023-10-18 | End: 2023-10-19 | Stop reason: HOSPADM

## 2023-10-18 RX ORDER — PREDNISONE 20 MG/1
40 TABLET ORAL DAILY
Status: DISCONTINUED | OUTPATIENT
Start: 2023-10-18 | End: 2023-10-19 | Stop reason: HOSPADM

## 2023-10-18 RX ORDER — HYDROXYZINE HYDROCHLORIDE 25 MG/1
25 TABLET, FILM COATED ORAL EVERY 6 HOURS PRN
Status: DISCONTINUED | OUTPATIENT
Start: 2023-10-18 | End: 2023-10-19 | Stop reason: HOSPADM

## 2023-10-18 RX ADMIN — MESALAMINE 1600 MG: 800 TABLET, DELAYED RELEASE ORAL at 08:14

## 2023-10-18 RX ADMIN — DICYCLOMINE HYDROCHLORIDE 10 MG: 10 CAPSULE ORAL at 20:51

## 2023-10-18 RX ADMIN — PREDNISONE 40 MG: 20 TABLET ORAL at 12:48

## 2023-10-18 RX ADMIN — ACETAMINOPHEN 650 MG: 325 TABLET ORAL at 15:44

## 2023-10-18 RX ADMIN — DICYCLOMINE HYDROCHLORIDE 10 MG: 10 CAPSULE ORAL at 08:14

## 2023-10-18 RX ADMIN — MESALAMINE 1600 MG: 800 TABLET, DELAYED RELEASE ORAL at 20:51

## 2023-10-18 RX ADMIN — OXYCODONE HYDROCHLORIDE 5 MG: 5 TABLET ORAL at 15:45

## 2023-10-18 RX ADMIN — ESCITALOPRAM OXALATE 10 MG: 10 TABLET ORAL at 12:46

## 2023-10-18 RX ADMIN — DICYCLOMINE HYDROCHLORIDE 10 MG: 10 CAPSULE ORAL at 12:46

## 2023-10-18 RX ADMIN — METHYLPREDNISOLONE SODIUM SUCCINATE 20 MG: 40 INJECTION, POWDER, FOR SOLUTION INTRAMUSCULAR; INTRAVENOUS at 05:17

## 2023-10-18 RX ADMIN — MESALAMINE 1600 MG: 800 TABLET, DELAYED RELEASE ORAL at 14:10

## 2023-10-18 RX ADMIN — HYDROXYZINE HYDROCHLORIDE 25 MG: 25 TABLET, FILM COATED ORAL at 15:45

## 2023-10-18 RX ADMIN — DICYCLOMINE HYDROCHLORIDE 10 MG: 10 CAPSULE ORAL at 18:31

## 2023-10-18 ASSESSMENT — ACTIVITIES OF DAILY LIVING (ADL)
ADLS_ACUITY_SCORE: 20

## 2023-10-18 NOTE — PLAN OF CARE
Problem: Bowel Disease, Inflammatory (Ulcerative Colitis or Crohn's Disease)  Goal: Optimal Pain Control and Function  Intervention: Prevent or Manage Pain  Recent Flowsheet Documentation  Taken 10/18/2023 1545 by Silva Bueno, RN  Pain Management Interventions: medication (see MAR)  Taken 10/18/2023 1240 by Silva Bueno, RN  Pain Management Interventions: rest  Taken 10/18/2023 0836 by Silva Bueno, RN  Pain Management Interventions: declines     Increased pain today after breakfast and lunch. Patient reported relief of pain with prn pain meds. Had multiple soft/loose stools today. Only reported 1 stool with blood. Plan is to discharge to home tomorrow pending pain control.

## 2023-10-18 NOTE — PROGRESS NOTES
"GI PROGRESS NOTE  10/17/2023  Nicki Antonytz  1980  [id number]    Subjective:   Had a good night, had several nonbloody stools without pain. Tolerated breakfast, now having some increased abdominal pain after most recent nonbloody stool. No other complaints.      Objective:     Blood pressure (!) 140/65, pulse 79, temperature 98.1  F (36.7  C), resp. rate 16, height 1.575 m (5' 2\"), weight 64.9 kg (143 lb 1.6 oz), last menstrual period 09/09/2023, SpO2 99%.    Body mass index is 26.17 kg/m .  Gen: NAD  Cardio: RRR  GI: Non-distended, BS positive, soft, mild diffuse tenderness  Neuro: Alert and orientated  Skin: No jaundice     Laboratory:  BMP  Recent Labs   Lab 10/16/23  1019      POTASSIUM 4.0   CHLORIDE 99   MANUEL 9.4   CO2 27   BUN 23.3*   CR 0.81   *     CBC  Recent Labs   Lab 10/18/23  0715 10/17/23  1756 10/17/23  0620 10/16/23  2200 10/16/23  1019   WBC 17.0*  --  11.0  --  13.2*   RBC 4.17  --  3.86  --  3.56*   HGB 9.6* 9.9* 8.7*   < > 7.7*   HCT 31.9*  --  28.9*  --  26.9*   MCV 77*  --  75*  --  76*   MCH 23.0*  --  22.5*  --  21.6*   MCHC 30.1*  --  30.1*  --  28.6*   RDW 17.2*  --  16.6*  --  17.4*   *  --  531*  --  600*    < > = values in this interval not displayed.     INRNo lab results found in last 7 days.   LFTs  Recent Labs   Lab Test 10/16/23  1019 10/09/23  0017 10/08/23  2122 01/04/23  1658 07/24/22  0822 02/14/19  1233 02/14/19  1233   ALBUMIN 4.1  --  3.6 3.6   < >  --  3.5   BILITOTAL <0.2  --  <0.2 0.2   < >  --  0.3   ALT 15  --  10 14   < >  --  19   AST 14  --  22 23   < >  --  18   PROTEIN  --  70*  --   --   --  100 mg/dL*  --    LIPASE 48  --  42  --   --   --  30    < > = values in this interval not displayed.     Procedures:  EGD 3/13/23:  Findings:       The examined esophagus was normal.        The entire examined stomach was normal. Biopsies were taken with a cold        forceps for histology.        The examined duodenum was normal. Biopsies were " taken with a cold        forceps for histology.      Colonoscopy 3/13/23:  Impressions/Post-Op Diagnosis:        - The examined portion of the ileum was normal.        - The transverse colon, hepatic flexure, ascending colon and cecum are        normal.        - Left-sided colitis. Inflammation was found from the anus to the        splenic flexure. This was mild in severity. Biopsied.        - Non-bleeding internal hemorrhoids.   A) DUODENUM, BIOPSY:   1. Normal duodenal mucosa   2. Negative for celiac disease and other enteropathy     B) STOMACH, BIOPSY:   1. Normal gastric antral and body mucosae   2. Negative for Helicobacter     C) COLON, LEFT, BIOPSY:   1. Markedly active chronic colitis consistent with inflammatory bowel disease (see comment)   2. Negative for dysplasia   3. CMV immunohistochemistry: Negative      Colonoscopy 7/25/23:  Findings:        The visualized terminal ileum appeared normal.        There was moderate inflammation in the rectosigmoid colon extending to        approximately 30 cm from anal verge consisting or erythema, granularity,        friabilty, small ulcers and erosions with adherent mucopus, and loss of        the vascular pattern. There were two small patches of small erosions and        small ulcers in cecum, two small patches of small erosions and small        ulcers in the proximal ascending colon, and two small patches of small        erosions and small ulcers in proximal transverse colon. The remainder of        colonic mucosa appeared normal. Biopsies taken from inflamed areas as        well as from normal appearing colon.      A) COLON, CECUM, BIOPSY:  -COLON WITH MILD ACTIVE COLITIS  -NEGATIVE FOR DYSPLASIA    B) COLON, ASCENDING, PROXIMAL, BIOPSY:  -COLON WITH MILD ACTIVE COLITIS  -NEGATIVE FOR DYSPLASIA     C) COLON, TRANSVERSE, PROXIMAL, BIOPSY:  -COLON WITH MILD ACTIVE COLITIS  -NEGATIVE FOR DYSPLASIA     D) COLON, TRANSVERSE, DISTAL, BIOPSY:  -COLONIC MUCOSA WITHOUT  DIAGNOSTIC NORMALITY    E) COLON, DESCENDING, BIOPSY:  -COLONIC MUCOSA WITHOUT DIAGNOSTIC NORMALITY     F) COLON, SIGMOID/RECTAL, BIOPSY:  -MILD TO SEVERE ACTIVE CHRONIC COLITIS WITHOUT GRANULOMAS OR SPECIFIC FEATURES  -NEGATIVE FOR DYSPLASIA  -SEE COMMENT  Imaging:  EXAM: CTA ABDOMEN PELVIS WITH CONTRAST  LOCATION: [facility]  DATE: 10/16/2023                                        IMPRESSION:     1.  No evidence of active gastrointestinal bleeding.   2.  Active colitis extending from the proximal descending colon through the rectum, overall mildly improved in severity and extent since 10/08/2023, particularly within the transverse colon.  3.  No new segments of active bowel inflammation.     Assessment:   Left sided ulcerative colitis   43 year old female with history of left-sided ulcerative colitis who has never been on maintenance therapy.  She is now admitted with recurrent diarrhea and hematochezia consistent with UC flare, with active left-sided inflammation seen on CT scan.  She was recently admitted but left AMA for similar reasons, and did start a short oral prednisone taper prescribed by her primary 10/9/2023.  This is likely a flare of her untreated ulcerative colitis. C. Diff negative. We will transition to oral prednisone today, continue oral mesalamine. Plan for follow-up in our IBD clinic to discuss long-term maintenance medications and follow-up. She may be able to go home later today depending on her pain.     2. Iron deficiency Anemia  Likely due to ongoing blood loss from GI tract, also history of menorrhagia. Received 1 unit PRBCs 10/16 due to hemoglobin of 6.9, now 9.6 today. She received IV venofer (300mg) yeserday, will give another dose if here tomorrow.      Plan:   1. IV Venofer 300mg every other day for max 3 doses  2. Oral prednisone taper: 40mg x7 days, then 35mg x7 days, then 30mg x7days, then 25mg x7days, 20mg x7 days, then 15mg x7 days, then 10mg x7days,  then 5mg x7days then stop   3. Continue mesalamine 1600mg TID   4. Low fiber diet   5. [facility] clinic follow-up, our office will arrange.     She could possibly discharge home later today if pain improves.       21min minutes of total time was spent providing patient care, including patient evaluation, reviewing documentation/test results, , and documentation.                                                                          Fern York PA-C  Thank you for the opportunity to participate in the care of this patient.   Please feel free to call me with any questions or concerns.  Phone number [phone].

## 2023-10-18 NOTE — PROGRESS NOTES
[facility] MEDICINE  PROGRESS NOTE     Code Status: Full Code       Identification/Summary:   Nicki Mckeon is a 43 year old female with PMH signficant for ulcerative colitis, nephrolithiasis, menorrhagia, TBI, anxiety, ADHD, BPPV.  At [facility] 10/8 tests 10/9 for colitis / GI bleed.  Left AMA.  Hemoglobin at that time 9.3.  Seen by primary and started oral steroids.  10/16 presented with ongoing diarrhea with bright red blood and clots.  Abdominal pain.  Hemoglobin 7.7.  White count 13.2.  CT scan showed active colitis from the proximal descending colon through to the rectum.  Consulted [facility] and initiated on IV steroids.  Hemoglobin down to 6.9.  Received PRBC x1 with hemoglobin improvement.  C. difficile negative.  Receiving Bentyl for abdominal cramping.  Diet advancing.  10/18 clinically feeling better and transitioning to oral steroids but had severe cramping after lunch.  Recommend monitoring the patient overnight with anticipated discharge 10/19.     Assessment and Plan:     Acute ulcerative colitis  Lower GI bleed  CT scan findings as above.  [facility] consultation appreciated.  Utilizing intravenous steroids per their recommendation.  C. difficile testing negative.  Discontinued enteric precautions.  Clear liquid diet advanced to low fiber diet.  Bentyl for abdominal cramping.  10/18 clinically feeling better and transition to p.o. prednisone.  Unfortunately had severe cramping after lunch and a bowel movement with some blood.  Recommend monitoring overnight.  Acute blood loss anemia  PRBC transfusion x1  Admission hemoglobin 7.7.  Patient has had issues with anemia in the past due to bleeding.  Consent obtained for transfusion.  Hemoglobin down to 6.9.  Transfused PRBC x1.  Recheck hemoglobin 8.7--> 9.9--> 9.6.  Following serial hemoglobins.  Transfuse if less than 7.  Leukocytosis-steroid-induced  Possibly combination of the colitis as well as the steroid she had  "had been on before.  Admission white count 13.2 recheck 11.   Subsequent white count up to 17 likely steroid induced.  No further checks indicated.    Thrombocytosis  Admission platelets 600.  Recheck 531.  Recommend outpatient follow-up with hematologist in 1 month.  Follow daily platelet level.  History of TBI  Anxiety/ADHD  At admission very tearful and anxious.  Started Lexapro 10 mg daily.  Subsequent mood appears improved.     Anticoagulation   Low Risk/Ambulatory with no VTE prophylaxis indicated     COVID19 PCR screened no respiratory symptoms  Fluids: Saline lock  Pain meds: Tylenol as needed and scheduled Bentyl  Therapy: N/A   [place]:Not present  Lines: None       Current Diet  Orders Placed This Encounter      Low Fiber Diet    Supplements  None    Barriers to Discharge: Abdominal cramping, food tolerance    Disposition: Anticipate discharge 10/19    Clinically Significant Risk Factors                         # Overweight: Estimated body mass index is 26.17 kg/m  as calculated from the following:    Height as of this encounter: 1.575 m (5' 2\").    Weight as of this encounter: 64.9 kg (143 lb 1.6 oz)., PRESENT ON ADMISSION            Interval History/Subjective:  Patient was doing well this morning.  Cramping was very mild.  However after lunch patient had a bowel movement with some blood and severe cramping.  Pain intensified.  Otherwise doing okay.  No chest pain.  No shortness of breath.  Questions answered to verbalized satisfaction.      Last 24H PRN:     melatonin tablet 1 mg, 1 mg at 10/17/23 2007    Physical Exam/Objective:  Temp:  [97.8  F (36.6  C)] 97.8  F (36.6  C)  Pulse:  [92-95] 92  Resp:  [17] 17  BP: (134-154)/(67-77) 134/77  SpO2:  [96 %-99 %] 99 %  Wt Readings from Last 4 Encounters:   10/16/23 64.9 kg (143 lb 1.6 oz)   10/09/23 65.5 kg (144 lb 4.8 oz)   10/08/23 59 kg (130 lb)   01/04/23 67.9 kg (149 lb 12.8 oz)     Body mass index is 26.17 kg/m .    Constitutional: awake, alert, " cooperative, no apparent distress, and appears stated age  ENT: Normocephalic, without obvious abnormality, atraumatic, external ears without lesions, oral pharynx with moist mucous membranes, tonsils without erythema or exudates, gums normal and good dentition.  Respiratory: No increased work of breathing, good air exchange, clear to auscultation bilaterally, no crackles or wheezing  Cardiovascular: Normal apical impulse, regular rate and rhythm, normal S1 and S2, no S3 or S4, and no murmur noted  GI: Positive bowel sounds soft nondistended.  Mild diffuse tenderness  Skin: normal skin color, texture, turgor, no redness, warmth, or swelling, and no rashes  Musculoskeletal: There is no redness, warmth, or swelling of the joints.  Motor strength is 5 out of 5 all extremities bilaterally.  Tone is normal. no lower extremity pitting edema present  Neurologic: Cranial nerves II-XII are grossly intact. Sensory:  Sensory intact  Neuropsychiatric: General: restless and normal eye contact Level of consciousness: alert / normal Affect: full and rapid pace of speech orientation: oriented to self, place, time and situation Memory and insight: normal, memory for past and recent events intact, and thought process normal      Medications:   Personally Reviewed.  Medications      dicyclomine  10 mg Oral 4x Daily    escitalopram  10 mg Oral Daily    [START ON 10/19/2023] iron sucrose  300 mg Intravenous Every Other Day    mesalamine  1,600 mg Oral TID       Data reviewed today: I personally reviewed all new medications, labs, imaging/diagnostics reports over the past 24 hours. Pertinent findings include:    Imaging:   No results found for this or any previous visit (from the past 24 hour(s)).    Labs:  CTA Abdomen Pelvis with Contrast   Final Result   IMPRESSION:      1.  No evidence of active gastrointestinal bleeding.       2.  Active colitis extending from the proximal descending colon through the rectum, overall mildly improved  in severity and extent since 10/08/2023, particularly within the transverse colon.      3.  No new segments of active bowel inflammation.        Recent Results (from the past 24 hour(s))   Hemoglobin    Collection Time: 10/17/23  5:56 PM   Result Value Ref Range    Hemoglobin 9.9 (L) 11.7 - 15.7 g/dL   CBC with platelets    Collection Time: 10/18/23  7:15 AM   Result Value Ref Range    WBC Count 17.0 (H) 4.0 - 11.0 10e3/uL    RBC Count 4.17 3.80 - 5.20 10e6/uL    Hemoglobin 9.6 (L) 11.7 - 15.7 g/dL    Hematocrit 31.9 (L) 35.0 - 47.0 %    MCV 77 (L) 78 - 100 fL    MCH 23.0 (L) 26.5 - 33.0 pg    MCHC 30.1 (L) 31.5 - 36.5 g/dL    RDW 17.2 (H) 10.0 - 15.0 %    Platelet Count 559 (H) 150 - 450 10e3/uL   CRP inflammation    Collection Time: 10/18/23  8:29 AM   Result Value Ref Range    CRP Inflammation 12.00 (H) <5.00 mg/L       Pending Labs:  Unresulted Labs Ordered in the Past 30 Days of this Admission       No orders found from 9/16/2023 to 10/17/2023.              Mauricio Garcia MD  [facility] Medicine  [facility]  Phone: #[phone]

## 2023-10-18 NOTE — PLAN OF CARE
"  Problem: Adult Inpatient Plan of Care  Goal: Optimal Comfort and Wellbeing  Intervention: Monitor Pain and Promote Comfort  Recent Flowsheet Documentation  Taken 10/17/2023 2010 by Josephine Sanchez, RN  Pain Management Interventions: (Declined essential oils)   medication (see MAR)   emotional support   distraction   care clustered   declines   quiet environment facilitated   rest     Problem: Anemia  Goal: Anemia Symptom Improvement  Intervention: Monitor and Manage Anemia  Recent Flowsheet Documentation  Taken 10/18/2023 0300 by Josephine Snachez, RN  Safety Promotion/Fall Prevention: safety round/check completed  Taken 10/17/2023 2010 by Josephine Sanchez, RN  Safety Promotion/Fall Prevention:   clutter free environment maintained   lighting adjusted   room near nurse's station   safety round/check completed    A/Ox4, uses call light appropriately. Calm and cooperative, VSS on RA. Complaining of \"unbearable\" pain at start of shift, 10/10, located bilaterally in lower abdomen, bowel sounds present. Declined PRN Acetaminophen, but reported pain relief after receiving Asacol. Pt reported that she became comfortable throughout shift, and was able to get sleep. Ambulating independently, reported having bloody stool at beginning of shift that \"had clots\" and was \"mostly blood\", at end of shift pt reported passing stool that was free of blood. Pt denied dizziness or weakness, BP and HR stable. Pt aware that she is to use the hat on the toilet and notify the nurse next time she passes stool. Pt speech is rapid, pt states d/t TBI she suffered 13 years ago. Refused midnight vital signs. L PIV is saline locked, flushing adequately. Adhering to low fiber diet.   "

## 2023-10-19 VITALS
SYSTOLIC BLOOD PRESSURE: 140 MMHG | BODY MASS INDEX: 26.33 KG/M2 | DIASTOLIC BLOOD PRESSURE: 76 MMHG | OXYGEN SATURATION: 97 % | HEART RATE: 89 BPM | TEMPERATURE: 97.8 F | WEIGHT: 143.1 LBS | RESPIRATION RATE: 16 BRPM | HEIGHT: 62 IN

## 2023-10-19 LAB
HGB BLD-MCNC: 9.4 G/DL (ref 11.7–15.7)
PLATELET # BLD AUTO: 569 10E3/UL (ref 150–450)

## 2023-10-19 PROCEDURE — 36415 COLL VENOUS BLD VENIPUNCTURE: CPT | Performed by: FAMILY MEDICINE

## 2023-10-19 PROCEDURE — 99239 HOSP IP/OBS DSCHRG MGMT >30: CPT | Performed by: FAMILY MEDICINE

## 2023-10-19 PROCEDURE — 258N000003 HC RX IP 258 OP 636: Performed by: PHYSICIAN ASSISTANT

## 2023-10-19 PROCEDURE — 85018 HEMOGLOBIN: CPT | Performed by: FAMILY MEDICINE

## 2023-10-19 PROCEDURE — 250N000011 HC RX IP 250 OP 636: Mod: JZ | Performed by: PHYSICIAN ASSISTANT

## 2023-10-19 PROCEDURE — 250N000013 HC RX MED GY IP 250 OP 250 PS 637: Performed by: PHYSICIAN ASSISTANT

## 2023-10-19 PROCEDURE — 85049 AUTOMATED PLATELET COUNT: CPT | Performed by: FAMILY MEDICINE

## 2023-10-19 PROCEDURE — 250N000013 HC RX MED GY IP 250 OP 250 PS 637: Performed by: FAMILY MEDICINE

## 2023-10-19 PROCEDURE — 250N000012 HC RX MED GY IP 250 OP 636 PS 637: Performed by: PHYSICIAN ASSISTANT

## 2023-10-19 RX ORDER — PREDNISONE 10 MG/1
TABLET ORAL
Qty: 122 TABLET | Refills: 0 | Status: SHIPPED | OUTPATIENT
Start: 2023-10-20 | End: 2023-12-14

## 2023-10-19 RX ORDER — ESCITALOPRAM OXALATE 10 MG/1
10 TABLET ORAL DAILY
Qty: 30 TABLET | Refills: 0 | Status: SHIPPED | OUTPATIENT
Start: 2023-10-19 | End: 2024-03-05

## 2023-10-19 RX ORDER — ONDANSETRON 4 MG/1
4 TABLET, ORALLY DISINTEGRATING ORAL EVERY 6 HOURS PRN
Qty: 20 TABLET | Refills: 0 | Status: SHIPPED | OUTPATIENT
Start: 2023-10-19

## 2023-10-19 RX ORDER — ACETAMINOPHEN 325 MG/1
650 TABLET ORAL EVERY 6 HOURS PRN
COMMUNITY
Start: 2023-10-19 | End: 2024-03-05

## 2023-10-19 RX ORDER — OXYCODONE HYDROCHLORIDE 5 MG/1
5 TABLET ORAL EVERY 6 HOURS PRN
Qty: 10 TABLET | Refills: 0 | Status: SHIPPED | OUTPATIENT
Start: 2023-10-19 | End: 2024-03-05

## 2023-10-19 RX ORDER — MESALAMINE 800 MG/1
1600 TABLET, DELAYED RELEASE ORAL 3 TIMES DAILY
Qty: 180 TABLET | Refills: 0 | Status: SHIPPED | OUTPATIENT
Start: 2023-10-19 | End: 2024-02-14

## 2023-10-19 RX ORDER — HYDROXYZINE HYDROCHLORIDE 25 MG/1
25 TABLET, FILM COATED ORAL EVERY 8 HOURS PRN
Qty: 20 TABLET | Refills: 0 | Status: SHIPPED | OUTPATIENT
Start: 2023-10-19 | End: 2024-03-05

## 2023-10-19 RX ORDER — DICYCLOMINE HYDROCHLORIDE 10 MG/1
10 CAPSULE ORAL 4 TIMES DAILY PRN
Qty: 30 CAPSULE | Refills: 0 | Status: SHIPPED | OUTPATIENT
Start: 2023-10-19

## 2023-10-19 RX ADMIN — OXYCODONE HYDROCHLORIDE 5 MG: 5 TABLET ORAL at 06:35

## 2023-10-19 RX ADMIN — DICYCLOMINE HYDROCHLORIDE 10 MG: 10 CAPSULE ORAL at 07:58

## 2023-10-19 RX ADMIN — PREDNISONE 40 MG: 20 TABLET ORAL at 07:58

## 2023-10-19 RX ADMIN — IRON SUCROSE 300 MG: 20 INJECTION, SOLUTION INTRAVENOUS at 08:36

## 2023-10-19 RX ADMIN — MESALAMINE 1600 MG: 800 TABLET, DELAYED RELEASE ORAL at 07:56

## 2023-10-19 ASSESSMENT — ACTIVITIES OF DAILY LIVING (ADL)
ADLS_ACUITY_SCORE: 20

## 2023-10-19 NOTE — PROGRESS NOTES
"GI PROGRESS NOTE  10/17/2023  Nicki Mckeon  1980  [id number]    Subjective:   Had a good night, overall pain has improved. She would like to go home today. No new complaints. Getting her IV iron infusion now.      Objective:     Blood pressure (!) 140/76, pulse 89, temperature 97.8  F (36.6  C), temperature source Oral, resp. rate 16, height 1.575 m (5' 2\"), weight 64.9 kg (143 lb 1.6 oz), last menstrual period 09/09/2023, SpO2 97%.    Body mass index is 26.17 kg/m .  Gen: NAD  Cardio: RRR  GI: Non-distended, BS positive, soft, mild diffuse tenderness  Neuro: Alert and orientated  Skin: No jaundice     Laboratory:  BMP  Recent Labs   Lab 10/16/23  1019      POTASSIUM 4.0   CHLORIDE 99   MANUEL 9.4   CO2 27   BUN 23.3*   CR 0.81   *     CBC  Recent Labs   Lab 10/19/23  0556 10/18/23  0715 10/17/23  1756 10/17/23  0620 10/16/23  2200 10/16/23  1019   WBC  --  17.0*  --  11.0  --  13.2*   RBC  --  4.17  --  3.86  --  3.56*   HGB 9.4* 9.6* 9.9* 8.7*   < > 7.7*   HCT  --  31.9*  --  28.9*  --  26.9*   MCV  --  77*  --  75*  --  76*   MCH  --  23.0*  --  22.5*  --  21.6*   MCHC  --  30.1*  --  30.1*  --  28.6*   RDW  --  17.2*  --  16.6*  --  17.4*   * 559*  --  531*  --  600*    < > = values in this interval not displayed.     INRNo lab results found in last 7 days.   LFTs  Recent Labs   Lab Test 10/16/23  1019 10/09/23  0017 10/08/23  2122 01/04/23  1658 07/24/22  0822 02/14/19  1233 02/14/19  1233   ALBUMIN 4.1  --  3.6 3.6   < >  --  3.5   BILITOTAL <0.2  --  <0.2 0.2   < >  --  0.3   ALT 15  --  10 14   < >  --  19   AST 14  --  22 23   < >  --  18   PROTEIN  --  70*  --   --   --  100 mg/dL*  --    LIPASE 48  --  42  --   --   --  30    < > = values in this interval not displayed.     Procedures:  EGD 3/13/23:  Findings:       The examined esophagus was normal.        The entire examined stomach was normal. Biopsies were taken with a cold        forceps for histology.        The " examined duodenum was normal. Biopsies were taken with a cold        forceps for histology.      Colonoscopy 3/13/23:  Impressions/Post-Op Diagnosis:        - The examined portion of the ileum was normal.        - The transverse colon, hepatic flexure, ascending colon and cecum are        normal.        - Left-sided colitis. Inflammation was found from the anus to the        splenic flexure. This was mild in severity. Biopsied.        - Non-bleeding internal hemorrhoids.   A) DUODENUM, BIOPSY:   1. Normal duodenal mucosa   2. Negative for celiac disease and other enteropathy     B) STOMACH, BIOPSY:   1. Normal gastric antral and body mucosae   2. Negative for Helicobacter     C) COLON, LEFT, BIOPSY:   1. Markedly active chronic colitis consistent with inflammatory bowel disease (see comment)   2. Negative for dysplasia   3. CMV immunohistochemistry: Negative      Colonoscopy 7/25/23:  Findings:        The visualized terminal ileum appeared normal.        There was moderate inflammation in the rectosigmoid colon extending to        approximately 30 cm from anal verge consisting or erythema, granularity,        friabilty, small ulcers and erosions with adherent mucopus, and loss of        the vascular pattern. There were two small patches of small erosions and        small ulcers in cecum, two small patches of small erosions and small        ulcers in the proximal ascending colon, and two small patches of small        erosions and small ulcers in proximal transverse colon. The remainder of        colonic mucosa appeared normal. Biopsies taken from inflamed areas as        well as from normal appearing colon.      A) COLON, CECUM, BIOPSY:  -COLON WITH MILD ACTIVE COLITIS  -NEGATIVE FOR DYSPLASIA    B) COLON, ASCENDING, PROXIMAL, BIOPSY:  -COLON WITH MILD ACTIVE COLITIS  -NEGATIVE FOR DYSPLASIA     C) COLON, TRANSVERSE, PROXIMAL, BIOPSY:  -COLON WITH MILD ACTIVE COLITIS  -NEGATIVE FOR DYSPLASIA     D) COLON, TRANSVERSE,  DISTAL, BIOPSY:  -COLONIC MUCOSA WITHOUT DIAGNOSTIC NORMALITY    E) COLON, DESCENDING, BIOPSY:  -COLONIC MUCOSA WITHOUT DIAGNOSTIC NORMALITY     F) COLON, SIGMOID/RECTAL, BIOPSY:  -MILD TO SEVERE ACTIVE CHRONIC COLITIS WITHOUT GRANULOMAS OR SPECIFIC FEATURES  -NEGATIVE FOR DYSPLASIA  -SEE COMMENT  Imaging:  EXAM: CTA ABDOMEN PELVIS WITH CONTRAST  LOCATION: [facility]  DATE: 10/16/2023                                        IMPRESSION:     1.  No evidence of active gastrointestinal bleeding.   2.  Active colitis extending from the proximal descending colon through the rectum, overall mildly improved in severity and extent since 10/08/2023, particularly within the transverse colon.  3.  No new segments of active bowel inflammation.     Assessment:   Left sided ulcerative colitis   43 year old female with history of left-sided ulcerative colitis who has never been on maintenance therapy.  She is now admitted with recurrent diarrhea and hematochezia consistent with UC flare, with active left-sided inflammation seen on CT scan.  She was recently admitted but left AMA for similar reasons, and did start a short oral prednisone taper prescribed by her primary 10/9/2023.  This is likely a flare of her untreated ulcerative colitis. C. Diff negative. Her CRP has improved as expected. We transitioned to oral prednisone yesterday, continued oral mesalamine and Bentyl for pain. Plan for follow-up in our IBD clinic to discuss long-term maintenance medications and follow-up.     2. Iron deficiency Anemia  Likely due to ongoing blood loss from GI tract, also history of menorrhagia. Received 1 unit PRBCs 10/16 due to hemoglobin of 6.9, now stable at 9.4 today. She received IV venofer (300mg) 10/17, will get another dose today.      Plan:   1. IV Venofer 300mg today  2. Oral prednisone taper: 40mg x7 days, then 35mg x7 days, then 30mg x7days, then 25mg x7days, 20mg x7 days, then 15mg x7 days, then 10mg  x7days, then 5mg x7days then stop   3. Continue mesalamine 1600mg TID   4. Bentyl 10mg up to 4 times daily as needed.   5. Can return to regular diet   6. [facility] clinic follow-up, our office will arrange.     Ok from our standpoint for discharge home. Discussed with primary team.        21min minutes of total time was spent providing patient care, including patient evaluation, reviewing documentation/test results, , and documentation.                                                                          Fern York PA-C  Thank you for the opportunity to participate in the care of this patient.   Please feel free to call me with any questions or concerns.  Phone number [phone].

## 2023-10-19 NOTE — DISCHARGE SUMMARY
"[facility] MEDICINE  DISCHARGE SUMMARY     Primary Care Physician: No Ref-Primary, Physician  Admission Date: 10/16/2023   Discharge Provider: Mauricio Garcia MD Discharge Date: 10/19/2023    Diet:   Active Diet and Nourishment Order   Procedures    Regular Diet Adult    Diet     Code Status: Full Code   Activity: DCACTIVITY: Activity as tolerated        Condition at Discharge: Stable     REASON FOR PRESENTATION(See Admission Note for Details)   Abdominal pain cramping and bloody bowel movements    PRINCIPAL & ACTIVE DISCHARGE DIAGNOSES     Principal Problem:    Acute ulcerative colitis (H)  Active Problems:    Anxiety state    Hx of traumatic brain injury    Anemia due to blood loss, acute  Lower GI bleed  RBC transfusion x1  Leukocytosis steroid-induced  Thrombocytosis  History of TBI  Anxiety    Clinically Significant Risk Factors                         # Overweight: Estimated body mass index is 26.17 kg/m  as calculated from the following:    Height as of this encounter: 1.575 m (5' 2\").    Weight as of this encounter: 64.9 kg (143 lb 1.6 oz)., PRESENT ON ADMISSION            PENDING LABS     Unresulted Labs Ordered in the Past 30 Days of this Admission       No orders found from 9/16/2023 to 10/17/2023.          PROCEDURES ( this hospitalization only)      None    RECOMMENDATIONS TO OUTPATIENT PROVIDER FOR F/U VISIT   Follow-up Appointments     Follow-up and recommended labs and tests       1.  Follow-up with primary care provider in 1 week.  Recheck hemoglobin   and platelets.  2.  Follow-up with [facility] per their recommendations.  Give office   phone number to patient.  3.  If platelets continue to be elevated may warrant evaluation by   hematology.  Coordinate with your primary.              DISPOSITION     Home    SUMMARY OF HOSPITAL COURSE:      Nicki Mckeon is a 43 year old female with PMH signficant for ulcerative colitis, nephrolithiasis, menorrhagia, TBI, " anxiety, ADHD, BPPV.  At  10/8-10/9 for colitis / GI bleed.  Left AMA.  Hemoglobin at that time 9.3.  Seen by primary and started oral steroids.  10/16 presented with ongoing diarrhea with bright red blood and clots.  Abdominal pain.  Hemoglobin 7.7.  White count 13.2.  CT scan showed active colitis from the proximal descending colon through to the rectum.  Admitted.      1.  GI.  Consulted [facility] and initiated on IV steroids and Asacol.  Hemoglobin down to 6.9.  Received PRBC x1 with hemoglobin improvement.  C. difficile negative.  Receiving Bentyl for abdominal cramping.  Diet advancing.  10/18 clinically feeling better and transitioning to oral steroids but had severe cramping after lunch.  Symptoms improved with some oxycodone and Atarax.  The following day doing well.  Cleared for discharge.  We will have prolonged steroid taper.  Follow-up with [facility].    2.  Anxiety.  Patient was quite tearful during her stay.  Agreed to restart Lexapro she had been on before.  Mood substantially improved at time of discharge.  Follow-up with primary.    3.  Hematology.  As noted above patient received PRBC transfusion x1.  Did also receive IV Venofer.  Hemoglobin stable at 9.4 at time of discharge.  Platelets have been noted to be elevated throughout her stay.  Recommend recheck with primary and may benefit from hematology outpatient consultation.    Discharge Medications with Med changes:     Current Discharge Medication List        START taking these medications    Details   acetaminophen (TYLENOL) 325 MG tablet Take 2 tablets (650 mg) by mouth every 6 hours as needed for mild pain or other (and adjunct with moderate or severe pain or per patient request)    Associated Diagnoses: Acute ulcerative colitis with rectal bleeding (H)      dicyclomine (BENTYL) 10 MG capsule Take 1 capsule (10 mg) by mouth 4 times daily as needed (Abdominal cramping.  Take before a meal.)  Qty: 30 capsule, Refills: 0    Associated  Diagnoses: Acute ulcerative colitis with rectal bleeding (H)      escitalopram (LEXAPRO) 10 MG tablet Take 1 tablet (10 mg) by mouth daily  Qty: 30 tablet, Refills: 0    Associated Diagnoses: Anxiety state      hydrOXYzine (ATARAX) 25 MG tablet Take 1 tablet (25 mg) by mouth every 8 hours as needed for other or anxiety (adjuvant pain)  Qty: 20 tablet, Refills: 0    Associated Diagnoses: Acute ulcerative colitis with rectal bleeding (H)      mesalamine (ASACOL HD) 800 MG EC tablet Take 2 tablets (1,600 mg) by mouth 3 times daily  Qty: 180 tablet, Refills: 0    Associated Diagnoses: Acute ulcerative colitis with rectal bleeding (H)      ondansetron (ZOFRAN ODT) 4 MG ODT tab Take 1 tablet (4 mg) by mouth every 6 hours as needed for nausea or vomiting  Qty: 20 tablet, Refills: 0    Associated Diagnoses: Acute ulcerative colitis with rectal bleeding (H)      oxyCODONE (ROXICODONE) 5 MG tablet Take 1 tablet (5 mg) by mouth every 6 hours as needed for severe pain  Qty: 10 tablet, Refills: 0    Associated Diagnoses: Acute ulcerative colitis with rectal bleeding (H)      phenylephrine-mineral oil-petrolatum (PREPARATION H) 0.25-14-74.9 % rectal ointment Place rectally 4 times daily as needed for hemorrhoids (hemorrhoidal irritation)  Qty: 56 g, Refills: 0    Associated Diagnoses: Acute ulcerative colitis with rectal bleeding (H)           CONTINUE these medications which have CHANGED    Details   predniSONE (DELTASONE) 10 MG tablet Take 4 tablets (40 mg) by mouth daily for 6 days, THEN 3.5 tablets (35 mg) daily for 7 days, THEN 3 tablets (30 mg) daily for 7 days, THEN 2.5 tablets (25 mg) daily for 7 days, THEN 2 tablets (20 mg) daily for 7 days, THEN 1.5 tablets (15 mg) daily for 7 days, THEN 1 tablet (10 mg) daily for 7 days, THEN 0.5 tablets (5 mg) daily for 7 days.  Qty: 122 tablet, Refills: 0    Associated Diagnoses: Acute ulcerative colitis with rectal bleeding (H)               Rationale for medication changes:   "    Lexapro for anxiety.  All other medications were for management of her acute colitis.      Consults     GASTROENTEROLOGY IP CONSULT      Immunizations given this encounter     Most Recent Immunizations   Administered Date(s) Administered    COVID-19 Monovalent 18+ (Moderna) 02/18/2021    Influenza Vaccine >6 months (Alfuria,Fluzone) 12/02/2019           Anticoagulation Information      Recent INR results: No results for input(s): \"INR\" in the last 168 hours.  Warfarin doses (if applicable) or name of other anticoagulant: N/A      SIGNIFICANT IMAGING FINDINGS     Results for orders placed or performed during the hospital encounter of 10/16/23   CTA Abdomen Pelvis with Contrast    Impression    IMPRESSION:    1.  No evidence of active gastrointestinal bleeding.     2.  Active colitis extending from the proximal descending colon through the rectum, overall mildly improved in severity and extent since 10/08/2023, particularly within the transverse colon.    3.  No new segments of active bowel inflammation.       SIGNIFICANT LABORATORY FINDINGS     Most Recent 3 CBC's:  Recent Labs   Lab Test 10/19/23  0556 10/18/23  0715 10/17/23  1756 10/17/23  0620 10/16/23  2200 10/16/23  1019   WBC  --  17.0*  --  11.0  --  13.2*   HGB 9.4* 9.6* 9.9* 8.7*   < > 7.7*   MCV  --  77*  --  75*  --  76*   * 559*  --  531*  --  600*    < > = values in this interval not displayed.     Most Recent 3 BMP's:  Recent Labs   Lab Test 10/16/23  1019 10/08/23  2122 01/04/23  1658    138 141   POTASSIUM 4.0 3.9 3.5   CHLORIDE 99 106 107   CO2 27 23 19*   BUN 23.3* 14.6 11   CR 0.81 0.70 0.80   ANIONGAP 11 9 15   MANUEL 9.4 8.8 8.8   * 95 94     Most Recent 2 LFT's:  Recent Labs   Lab Test 10/16/23  1019 10/08/23  2122   AST 14 22   ALT 15 10   ALKPHOS 82 105*   BILITOTAL <0.2 <0.2     Most Recent TSH and T4:No lab results found.  Most Recent Hemoglobin A1c:No lab results found.  Most Recent 6 glucoses:  Recent Labs   Lab Test " 10/16/23  1019 10/08/23  2122 01/04/23  1658 07/26/22  1026 07/26/22  0839 07/25/22  1704   * 95 94 112 82 105*     Most Recent ESR & CRP:  Recent Labs   Lab Test 10/18/23  0829 10/08/23  2122 07/26/22  1026   CRP  --   --  5.8*   CRPI 12.00*   < >  --     < > = values in this interval not displayed.        7-Day Micro Results       Collected Updated Procedure Result Status      10/17/2023 0600 10/17/2023 1127 C. difficile Toxin B PCR with reflex to C. difficile Antigen and Toxins A/B EIA [[id number]]    Stool from Per Rectum    Final result Component Value   C Difficile Toxin B by PCR Negative   A negative result does not exclude actual disease due to C. difficile and may be due to improper collection, handling and storage of the specimen or the number of organisms in the specimen is below the detection limit of the assay.            10/16/2023 1446 10/18/2023 0930 Quantiferon TB Gold Plus Grey Tube [[id number]]   Blood from Arm, Right    Final result Component Value Units   Quantiferon Nil Tube 0.00 IU/mL            10/16/2023 1446 10/18/2023 0932 Quantiferon TB Gold Plus Green Tube [[id number]]   Blood from Arm, Right    Final result Component Value Units   Quantiferon TB1 Tube 0.00 IU/mL            10/16/2023 1446 10/18/2023 0930 Quantiferon TB Gold Plus Yellow Tube [[id number]]   Blood from Arm, Right    Final result Component Value   Quantiferon TB2 Tube 0.00            10/16/2023 1446 10/18/2023 0929 Quantiferon TB Gold Plus Purple Tube [[id number]]   Blood from Arm, Right    Final result Component Value Units   Quantiferon Mitogen 3.86 IU/mL            10/16/2023 1446 10/18/2023 0935 Quantiferon TB Gold Plus [[id number]]   Blood from Arm, Right    Final result Component Value Units   Quantiferon-TB Gold Plus Negative    No interferon gamma response to M.tuberculosis antigens was detected. Infection with M.tuberculosis is unlikely, however a single negative result does not exclude  infection. In patients at high risk for infection, a second test should be considered in accordance with the 2017 ATS/IDSA/CDC Clinical Pract  ice Guidelines for Diagnosis of Tuberculosis in Adults and Children    TB1 Ag minus Nil Value 0.00 IU/mL   TB2 Ag minus Nil Value 0.00 IU/mL   Mitogen minus Nil Result 3.86 IU/mL   Nil Result 0.00 IU/mL                      Discharge Orders        Reason for your hospital stay    Ulcerative colitis flare     Activity    Your activity upon discharge: activity as tolerated     When to contact your care team    Call [facility] if you have any of the following: temperature greater than 100.5, increased swelling, increased pain, or worsening blood with bowel movements.     Follow-up and recommended labs and tests     1.  Follow-up with primary care provider in 1 week.  Recheck hemoglobin and platelets.  2.  Follow-up with [facility] per their recommendations.  Give office phone number to patient.  3.  If platelets continue to be elevated may warrant evaluation by hematology.  Coordinate with your primary.     Diet    Follow this diet upon discharge: Orders Placed This Encounter      Regular Diet Adult       Examination   Physical Exam   Temp:  [97.4  F (36.3  C)-98.3  F (36.8  C)] 97.8  F (36.6  C)  Pulse:  [85-91] 89  Resp:  [16-18] 16  BP: (129-158)/(70-81) 140/76  SpO2:  [95 %-98 %] 97 %  Wt Readings from Last 4 Encounters:   10/16/23 64.9 kg (143 lb 1.6 oz)   10/09/23 65.5 kg (144 lb 4.8 oz)   10/08/23 59 kg (130 lb)   01/04/23 67.9 kg (149 lb 12.8 oz)       Constitutional: awake, alert, cooperative, no apparent distress, and appears stated age  Eyes: Lids and lashes normal, pupils equal, round and reactive to light, extra ocular muscles intact, sclera clear, conjunctiva normal  ENT: Normocephalic, without obvious abnormality, atraumatic, sinuses nontender on palpation, external ears without lesions, oral pharynx with moist mucous membranes, tonsils without erythema or  exudates, gums normal and good dentition.  Respiratory: No increased work of breathing, good air exchange, clear to auscultation bilaterally, no crackles or wheezing  Cardiovascular: Normal apical impulse, regular rate and rhythm, normal S1 and S2, no S3 or S4, and no murmur noted  GI: No scars, normal bowel sounds, soft, non-distended, non-tender, no masses palpated, no hepatosplenomegally  Skin: normal skin color, texture, turgor, no redness, warmth, or swelling, and no rashes  Musculoskeletal: There is no redness, warmth, or swelling of the joints.  Full range of motion noted.  Motor strength is 5 out of 5 all extremities bilaterally.  Tone is normal. no lower extremity pitting edema present  Neurologic: Cranial nerves II-XII are grossly intact. Sensory:  Sensory intact  Neuropsychiatric: General: normal, calm, and normal eye contact Level of consciousness: alert / normal Affect: normal and mild pressured speech but baseline.  Pleasant.  Orientation: oriented to self, place, time and situation Memory and insight: normal, memory for past and recent events intact, and thought process normal      Please see EMR for more detailed significant labs, imaging, consultant notes etc.    I, Mauricio Garcia MD, personally saw the patient today and spent greater than 30 minutes discharging this patient.    Maruicio Garcia MD  [facility]    CC:No Ref-Primary, Physician

## 2023-10-19 NOTE — PROGRESS NOTES
Patient discharged to home from discharge lounge. VSS on RA. PIV access removed prior to discharge. Belongings remain with pt at discharge. AVS reviewed with pt, questions answered, education complete.

## 2023-10-19 NOTE — PROGRESS NOTES
Care Management Discharge Note    Discharge Date: 10/19/2023       Discharge Disposition: Home    Discharge Services: None    Discharge DME: None    Discharge Transportation: self    Private pay costs discussed: Not applicable      Education Provided on the Discharge Plan: Yes (AVS per bedside RN)  Persons Notified of Discharge Plans: pt  Patient/Family in Agreement with the Plan: yes    Handoff Referral Completed: Yes    Additional Information:  10:32 AM  Pt medically ready to discharge home with no needs.  Pt will drive self home, car in parking lot.    ISAAC Issa

## 2023-10-19 NOTE — PLAN OF CARE
Problem: Adult Inpatient Plan of Care  Goal: Optimal Comfort and Wellbeing  Intervention: Monitor Pain and Promote Comfort  Recent Flowsheet Documentation  Taken 10/18/2023 2053 by Felix Mccormick, RN  Pain Management Interventions: medication (see MAR)   Goal Outcome Evaluation:       Pt is alert and oriented. VSS. Complained of lower abdominal pain 7/8, decline prn pain meds, but reported some relief with Ebntyl and Asacol last night. Pt sleeping comfortably throughout shift. In the morning, pt reported one bloody stool. Complains of pain after having BM. PRN Oxycodone given.  Instructed pt to use hat and notify RN if she has another bloody stool. Pt ambulating independently in room.

## 2023-10-21 ENCOUNTER — PATIENT OUTREACH (OUTPATIENT)
Dept: CARE COORDINATION | Facility: CLINIC | Age: 43
End: 2023-10-21

## 2023-10-21 NOTE — PROGRESS NOTES
[facility] Care Resource [facility] Contact  [facility]/Voicemail     Clinical Data: Post-Discharge Outreach     Outreach attempted x 2.  Left message on patient's voicemail, providing [facility]'s central phone number of [phone] ([phone]) for questions/concerns and/or to schedule an appt with an [facility] provider, if they do not have a PCP.      Plan:  [facility] will do no further outreaches at this time.       Cindy Wayne  Community Health Worker  [facility], [facility]  [phone]        *Connected Care Resource Team does NOT follow patient ongoing. Referrals are identified based on internal discharge reports and the outreach is to ensure patient has an understanding of their discharge instructions.

## 2023-11-25 ENCOUNTER — HEALTH MAINTENANCE LETTER (OUTPATIENT)
Age: 43
End: 2023-11-25

## 2024-02-03 ENCOUNTER — HEALTH MAINTENANCE LETTER (OUTPATIENT)
Age: 44
End: 2024-02-03

## 2024-02-14 ENCOUNTER — HOSPITAL ENCOUNTER (EMERGENCY)
Facility: CLINIC | Age: 44
Discharge: HOME | End: 2024-02-14
Attending: EMERGENCY MEDICINE | Admitting: EMERGENCY MEDICINE
Payer: COMMERCIAL

## 2024-02-14 ENCOUNTER — APPOINTMENT (OUTPATIENT)
Dept: CT IMAGING | Facility: CLINIC | Age: 44
End: 2024-02-14
Attending: EMERGENCY MEDICINE
Payer: COMMERCIAL

## 2024-02-14 VITALS
TEMPERATURE: 98.4 F | HEART RATE: 87 BPM | RESPIRATION RATE: 20 BRPM | HEIGHT: 62 IN | WEIGHT: 140 LBS | OXYGEN SATURATION: 100 % | DIASTOLIC BLOOD PRESSURE: 72 MMHG | BODY MASS INDEX: 25.76 KG/M2 | SYSTOLIC BLOOD PRESSURE: 139 MMHG

## 2024-02-14 DIAGNOSIS — K51.911 ACUTE ULCERATIVE COLITIS WITH RECTAL BLEEDING (H): ICD-10-CM

## 2024-02-14 DIAGNOSIS — K51.919 ULCERATIVE COLITIS WITH COMPLICATION, UNSPECIFIED LOCATION (H): ICD-10-CM

## 2024-02-14 DIAGNOSIS — N20.1 URETERAL STONE: ICD-10-CM

## 2024-02-14 LAB
ALBUMIN SERPL BCG-MCNC: 3.9 G/DL (ref 3.5–5.2)
ALP SERPL-CCNC: 79 U/L (ref 40–150)
ALT SERPL W P-5'-P-CCNC: 12 U/L (ref 0–50)
ANION GAP SERPL CALCULATED.3IONS-SCNC: 11 MMOL/L (ref 7–15)
AST SERPL W P-5'-P-CCNC: 20 U/L (ref 0–45)
ATRIAL RATE - MUSE: 84 BPM
BASOPHILS # BLD AUTO: 0.1 10E3/UL (ref 0–0.2)
BASOPHILS NFR BLD AUTO: 1 %
BILIRUB SERPL-MCNC: 0.3 MG/DL
BUN SERPL-MCNC: 15.3 MG/DL (ref 6–20)
CALCIUM SERPL-MCNC: 8.6 MG/DL (ref 8.6–10)
CHLORIDE SERPL-SCNC: 106 MMOL/L (ref 98–107)
CREAT SERPL-MCNC: 0.7 MG/DL (ref 0.51–0.95)
DEPRECATED HCO3 PLAS-SCNC: 24 MMOL/L (ref 22–29)
DIASTOLIC BLOOD PRESSURE - MUSE: NORMAL MMHG
EGFRCR SERPLBLD CKD-EPI 2021: >90 ML/MIN/1.73M2
EOSINOPHIL # BLD AUTO: 0.4 10E3/UL (ref 0–0.7)
EOSINOPHIL NFR BLD AUTO: 5 %
ERYTHROCYTE [DISTWIDTH] IN BLOOD BY AUTOMATED COUNT: 16.9 % (ref 10–15)
GLUCOSE SERPL-MCNC: 87 MG/DL (ref 70–99)
HCT VFR BLD AUTO: 29.4 % (ref 35–47)
HGB BLD-MCNC: 8.6 G/DL (ref 11.7–15.7)
IMM GRANULOCYTES # BLD: 0 10E3/UL
IMM GRANULOCYTES NFR BLD: 1 %
INTERPRETATION ECG - MUSE: NORMAL
LYMPHOCYTES # BLD AUTO: 1.6 10E3/UL (ref 0.8–5.3)
LYMPHOCYTES NFR BLD AUTO: 22 %
MCH RBC QN AUTO: 22.1 PG (ref 26.5–33)
MCHC RBC AUTO-ENTMCNC: 29.3 G/DL (ref 31.5–36.5)
MCV RBC AUTO: 75 FL (ref 78–100)
MONOCYTES # BLD AUTO: 0.6 10E3/UL (ref 0–1.3)
MONOCYTES NFR BLD AUTO: 8 %
NEUTROPHILS # BLD AUTO: 4.8 10E3/UL (ref 1.6–8.3)
NEUTROPHILS NFR BLD AUTO: 63 %
NRBC # BLD AUTO: 0 10E3/UL
NRBC BLD AUTO-RTO: 0 /100
P AXIS - MUSE: 29 DEGREES
PLATELET # BLD AUTO: 357 10E3/UL (ref 150–450)
POTASSIUM SERPL-SCNC: 3.5 MMOL/L (ref 3.4–5.3)
PR INTERVAL - MUSE: 120 MS
PROT SERPL-MCNC: 7 G/DL (ref 6.4–8.3)
QRS DURATION - MUSE: 70 MS
QT - MUSE: 378 MS
QTC - MUSE: 446 MS
R AXIS - MUSE: 60 DEGREES
RBC # BLD AUTO: 3.9 10E6/UL (ref 3.8–5.2)
SODIUM SERPL-SCNC: 141 MMOL/L (ref 135–145)
SYSTOLIC BLOOD PRESSURE - MUSE: NORMAL MMHG
T AXIS - MUSE: 58 DEGREES
VENTRICULAR RATE- MUSE: 84 BPM
WBC # BLD AUTO: 7.5 10E3/UL (ref 4–11)

## 2024-02-14 PROCEDURE — 36415 COLL VENOUS BLD VENIPUNCTURE: CPT | Performed by: EMERGENCY MEDICINE

## 2024-02-14 PROCEDURE — 80053 COMPREHEN METABOLIC PANEL: CPT | Performed by: EMERGENCY MEDICINE

## 2024-02-14 PROCEDURE — 99285 EMERGENCY DEPT VISIT HI MDM: CPT | Mod: 25

## 2024-02-14 PROCEDURE — 250N000011 HC RX IP 250 OP 636: Performed by: EMERGENCY MEDICINE

## 2024-02-14 PROCEDURE — 258N000003 HC RX IP 258 OP 636: Performed by: EMERGENCY MEDICINE

## 2024-02-14 PROCEDURE — 93005 ELECTROCARDIOGRAM TRACING: CPT | Performed by: EMERGENCY MEDICINE

## 2024-02-14 PROCEDURE — 96360 HYDRATION IV INFUSION INIT: CPT | Mod: 59

## 2024-02-14 PROCEDURE — 85025 COMPLETE CBC W/AUTO DIFF WBC: CPT | Performed by: EMERGENCY MEDICINE

## 2024-02-14 PROCEDURE — 74177 CT ABD & PELVIS W/CONTRAST: CPT

## 2024-02-14 RX ORDER — IOPAMIDOL 755 MG/ML
100 INJECTION, SOLUTION INTRAVASCULAR ONCE
Status: COMPLETED | OUTPATIENT
Start: 2024-02-14 | End: 2024-02-14

## 2024-02-14 RX ORDER — MESALAMINE 800 MG/1
1600 TABLET, DELAYED RELEASE ORAL 3 TIMES DAILY
Qty: 180 TABLET | Refills: 0 | Status: SHIPPED | OUTPATIENT
Start: 2024-02-14

## 2024-02-14 RX ADMIN — IOPAMIDOL 90 ML: 755 INJECTION, SOLUTION INTRAVENOUS at 10:51

## 2024-02-14 RX ADMIN — SODIUM CHLORIDE 1000 ML: 9 INJECTION, SOLUTION INTRAVENOUS at 10:00

## 2024-02-14 ASSESSMENT — ACTIVITIES OF DAILY LIVING (ADL): ADLS_ACUITY_SCORE: 35

## 2024-02-14 NOTE — DISCHARGE INSTRUCTIONS
Your hemoglobin was 8.6  Take medications as prescribed  30-day supply of Asacol prescribed  Call for follow-up with gastroenterology and urology

## 2024-02-14 NOTE — ED TRIAGE NOTES
Pt presents with Crohn's flare and has been out of her medication for 2 weeks. Would like her hemoglobin checked. Endorses severe rectal bleeding with every time she has a bowel movement. Endorses some dizziness     Triage Assessment (Adult)       Row Name 02/14/24 0906          Triage Assessment    Airway WDL WDL        Respiratory WDL    Respiratory WDL WDL        Skin Circulation/Temperature WDL    Skin Circulation/Temperature WDL WDL        Cardiac WDL    Cardiac WDL WDL        Peripheral/Neurovascular WDL    Peripheral Neurovascular WDL WDL        Cognitive/Neuro/Behavioral WDL    Cognitive/Neuro/Behavioral WDL WDL
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
## 2024-02-19 NOTE — LETTER
February 19, 2024  Re: Nicki Mckeon  YOB: 1980    Dear Nicki    We have been trying to reach you in regards to your referral to the [facility] UROLOGY [facility]. We have been unable to schedule the referral after several contact attempts.      If you have any questions or concerns, or to schedule and appointment, please contact our office at Dept: [phone].        Sincerely,  [facility] UROLOGY [facility]

## 2024-02-28 ENCOUNTER — TELEPHONE (OUTPATIENT)
Dept: UROLOGY | Facility: CLINIC | Age: 44
End: 2024-02-28
Payer: COMMERCIAL

## 2024-02-28 NOTE — TELEPHONE ENCOUNTER
This encounter is being sent to inform the clinic that this patient has a referral from MD Gabriel Joyce for the diagnoses of Ureteral stone [N20.1]  and has requested that this patient be seen within 1-2 weeks. Based on the availability of our provider(s), we are unable to accommodate this request.   Were all sites offered this patient?  Yes    Does scheduling algorithm request to schedule next available?  Patient has been scheduled for the first available opening with Dr. Spann on April 8th, 2024 @ 1:45pm.  We have informed the patient that the clinic will review their referral and reach out if a sooner appointment is medically necessary.  Patient was also added to the wait list.

## 2024-03-04 ENCOUNTER — VIRTUAL VISIT (OUTPATIENT)
Dept: UROLOGY | Facility: CLINIC | Age: 44
End: 2024-03-04
Attending: EMERGENCY MEDICINE
Payer: COMMERCIAL

## 2024-03-04 DIAGNOSIS — N20.1 URETERAL STONE: ICD-10-CM

## 2024-03-04 PROCEDURE — 99203 OFFICE O/P NEW LOW 30 MIN: CPT | Mod: GT | Performed by: UROLOGY

## 2024-03-04 ASSESSMENT — PAIN SCALES - GENERAL: PAINLEVEL: EXTREME PAIN (8)
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
## 2024-03-04 NOTE — NURSING NOTE
Pt stated she passed a kidney stone last night     Is the patient currently in the state of [place]? YES    Visit mode:VIDEO    If the visit is dropped, the patient can be reconnected by: VIDEO VISIT: Text to cell phone:   Telephone Information:   Mobile [phone]   Mobile Not on file.       Will anyone else be joining the visit? NO  (If patient encounters technical issues they should call [phone]    How would you like to obtain your AVS? MyChart    Are changes needed to the allergy or medication list? No    Reason for visit: Consult (New kidney stone)    Ree ALFORD

## 2025-01-04 ENCOUNTER — HEALTH MAINTENANCE LETTER (OUTPATIENT)
Age: 45
End: 2025-01-04

## 2025-04-07 ENCOUNTER — HOSPITAL ENCOUNTER (INPATIENT)
Facility: CLINIC | Age: 45
DRG: 387 | End: 2025-04-07
Attending: EMERGENCY MEDICINE | Admitting: FAMILY MEDICINE
Payer: COMMERCIAL

## 2025-04-07 ENCOUNTER — APPOINTMENT (OUTPATIENT)
Dept: CT IMAGING | Facility: CLINIC | Age: 45
DRG: 387 | End: 2025-04-07
Attending: EMERGENCY MEDICINE
Payer: COMMERCIAL

## 2025-04-07 DIAGNOSIS — K51.911 ACUTE ULCERATIVE COLITIS WITH RECTAL BLEEDING (H): Primary | ICD-10-CM

## 2025-04-07 DIAGNOSIS — F41.1 ANXIETY STATE: ICD-10-CM

## 2025-04-07 DIAGNOSIS — K51.90 EXACERBATION OF ULCERATIVE COLITIS WITHOUT COMPLICATION (H): ICD-10-CM

## 2025-04-07 DIAGNOSIS — Z87.820 HX OF TRAUMATIC BRAIN INJURY: Chronic | ICD-10-CM

## 2025-04-07 PROBLEM — F90.9 ADHD (ATTENTION DEFICIT HYPERACTIVITY DISORDER): Status: ACTIVE | Noted: 2022-07-24

## 2025-04-07 LAB
ABO + RH BLD: NORMAL
ALBUMIN SERPL BCG-MCNC: 3.5 G/DL (ref 3.5–5.2)
ALP SERPL-CCNC: 91 U/L (ref 40–150)
ALT SERPL W P-5'-P-CCNC: NORMAL U/L
ANION GAP SERPL CALCULATED.3IONS-SCNC: 13 MMOL/L (ref 7–15)
AST SERPL W P-5'-P-CCNC: NORMAL U/L
BASOPHILS # BLD MANUAL: 0 10E3/UL (ref 0–0.2)
BASOPHILS NFR BLD MANUAL: 0 %
BILIRUB DIRECT SERPL-MCNC: <0.08 MG/DL (ref 0–0.3)
BILIRUB SERPL-MCNC: 0.3 MG/DL
BLD GP AB SCN SERPL QL: NEGATIVE
BUN SERPL-MCNC: 14 MG/DL (ref 6–20)
CALCIUM SERPL-MCNC: 9.3 MG/DL (ref 8.8–10.4)
CHLORIDE SERPL-SCNC: 98 MMOL/L (ref 98–107)
CREAT SERPL-MCNC: 0.78 MG/DL (ref 0.51–0.95)
EGFRCR SERPLBLD CKD-EPI 2021: >90 ML/MIN/1.73M2
ELLIPTOCYTES BLD QL SMEAR: SLIGHT
EOSINOPHIL # BLD MANUAL: 0 10E3/UL (ref 0–0.7)
EOSINOPHIL NFR BLD MANUAL: 0 %
ERYTHROCYTE [DISTWIDTH] IN BLOOD BY AUTOMATED COUNT: 25.5 % (ref 10–15)
GLUCOSE SERPL-MCNC: 112 MG/DL (ref 70–99)
HCO3 SERPL-SCNC: 24 MMOL/L (ref 22–29)
HCT VFR BLD AUTO: 31.4 % (ref 35–47)
HGB BLD-MCNC: 9.1 G/DL (ref 11.7–15.7)
HOLD SPECIMEN: NORMAL
LACTATE SERPL-SCNC: 0.9 MMOL/L (ref 0.7–2)
LIPASE SERPL-CCNC: 60 U/L (ref 13–60)
LYMPHOCYTES # BLD MANUAL: 1.8 10E3/UL (ref 0.8–5.3)
LYMPHOCYTES NFR BLD MANUAL: 14 %
MCH RBC QN AUTO: 22.9 PG (ref 26.5–33)
MCHC RBC AUTO-ENTMCNC: 29 G/DL (ref 31.5–36.5)
MCV RBC AUTO: 79 FL (ref 78–100)
METAMYELOCYTES # BLD MANUAL: 0.3 10E3/UL
METAMYELOCYTES NFR BLD MANUAL: 2 %
MONOCYTES # BLD MANUAL: 0.8 10E3/UL (ref 0–1.3)
MONOCYTES NFR BLD MANUAL: 6 %
NEUTROPHILS # BLD MANUAL: 10 10E3/UL (ref 1.6–8.3)
NEUTROPHILS NFR BLD MANUAL: 78 %
NRBC # BLD AUTO: 0.3 10E3/UL
NRBC BLD MANUAL-RTO: 2 %
PLAT MORPH BLD: ABNORMAL
PLATELET # BLD AUTO: 630 10E3/UL (ref 150–450)
POLYCHROMASIA BLD QL SMEAR: SLIGHT
POTASSIUM SERPL-SCNC: 4.7 MMOL/L (ref 3.4–5.3)
POTASSIUM SERPL-SCNC: 5.4 MMOL/L (ref 3.4–5.3)
PROT SERPL-MCNC: 7.4 G/DL (ref 6.4–8.3)
RBC # BLD AUTO: 3.98 10E6/UL (ref 3.8–5.2)
RBC MORPH BLD: ABNORMAL
SODIUM SERPL-SCNC: 135 MMOL/L (ref 135–145)
SPECIMEN EXP DATE BLD: NORMAL
WBC # BLD AUTO: 12.8 10E3/UL (ref 4–11)

## 2025-04-07 PROCEDURE — 96375 TX/PRO/DX INJ NEW DRUG ADDON: CPT

## 2025-04-07 PROCEDURE — 96374 THER/PROPH/DIAG INJ IV PUSH: CPT | Mod: 59

## 2025-04-07 PROCEDURE — 99285 EMERGENCY DEPT VISIT HI MDM: CPT | Mod: 25

## 2025-04-07 PROCEDURE — 36415 COLL VENOUS BLD VENIPUNCTURE: CPT | Performed by: FAMILY MEDICINE

## 2025-04-07 PROCEDURE — 83605 ASSAY OF LACTIC ACID: CPT | Performed by: EMERGENCY MEDICINE

## 2025-04-07 PROCEDURE — 87507 IADNA-DNA/RNA PROBE TQ 12-25: CPT | Performed by: FAMILY MEDICINE

## 2025-04-07 PROCEDURE — 80048 BASIC METABOLIC PNL TOTAL CA: CPT | Performed by: EMERGENCY MEDICINE

## 2025-04-07 PROCEDURE — 36415 COLL VENOUS BLD VENIPUNCTURE: CPT | Performed by: EMERGENCY MEDICINE

## 2025-04-07 PROCEDURE — 258N000003 HC RX IP 258 OP 636: Performed by: EMERGENCY MEDICINE

## 2025-04-07 PROCEDURE — 120N000001 HC R&B MED SURG/OB

## 2025-04-07 PROCEDURE — 87493 C DIFF AMPLIFIED PROBE: CPT | Performed by: FAMILY MEDICINE

## 2025-04-07 PROCEDURE — 85007 BL SMEAR W/DIFF WBC COUNT: CPT | Performed by: EMERGENCY MEDICINE

## 2025-04-07 PROCEDURE — 86900 BLOOD TYPING SEROLOGIC ABO: CPT | Performed by: FAMILY MEDICINE

## 2025-04-07 PROCEDURE — 85018 HEMOGLOBIN: CPT | Performed by: EMERGENCY MEDICINE

## 2025-04-07 PROCEDURE — 74177 CT ABD & PELVIS W/CONTRAST: CPT

## 2025-04-07 PROCEDURE — 250N000011 HC RX IP 250 OP 636: Performed by: EMERGENCY MEDICINE

## 2025-04-07 PROCEDURE — 96361 HYDRATE IV INFUSION ADD-ON: CPT

## 2025-04-07 PROCEDURE — 250N000013 HC RX MED GY IP 250 OP 250 PS 637: Performed by: FAMILY MEDICINE

## 2025-04-07 PROCEDURE — 84132 ASSAY OF SERUM POTASSIUM: CPT | Performed by: FAMILY MEDICINE

## 2025-04-07 PROCEDURE — 99223 1ST HOSP IP/OBS HIGH 75: CPT | Performed by: FAMILY MEDICINE

## 2025-04-07 PROCEDURE — 83690 ASSAY OF LIPASE: CPT | Performed by: EMERGENCY MEDICINE

## 2025-04-07 PROCEDURE — 250N000011 HC RX IP 250 OP 636: Mod: JZ | Performed by: FAMILY MEDICINE

## 2025-04-07 PROCEDURE — 84155 ASSAY OF PROTEIN SERUM: CPT | Performed by: EMERGENCY MEDICINE

## 2025-04-07 PROCEDURE — 85041 AUTOMATED RBC COUNT: CPT | Performed by: EMERGENCY MEDICINE

## 2025-04-07 PROCEDURE — 250N000011 HC RX IP 250 OP 636: Mod: JZ | Performed by: EMERGENCY MEDICINE

## 2025-04-07 PROCEDURE — 250N000011 HC RX IP 250 OP 636: Performed by: INTERNAL MEDICINE

## 2025-04-07 RX ORDER — ONDANSETRON 2 MG/ML
4 INJECTION INTRAMUSCULAR; INTRAVENOUS ONCE
Status: COMPLETED | OUTPATIENT
Start: 2025-04-07 | End: 2025-04-07

## 2025-04-07 RX ORDER — METHYLPREDNISOLONE SODIUM SUCCINATE 40 MG/ML
20 INJECTION INTRAMUSCULAR; INTRAVENOUS ONCE
Status: COMPLETED | OUTPATIENT
Start: 2025-04-07 | End: 2025-04-07

## 2025-04-07 RX ORDER — HYDROMORPHONE HCL IN WATER/PF 6 MG/30 ML
0.2 PATIENT CONTROLLED ANALGESIA SYRINGE INTRAVENOUS
Status: DISCONTINUED | OUTPATIENT
Start: 2025-04-07 | End: 2025-04-11 | Stop reason: HOSPADM

## 2025-04-07 RX ORDER — CALCIUM CARBONATE 500 MG/1
1000 TABLET, CHEWABLE ORAL 4 TIMES DAILY PRN
Status: DISCONTINUED | OUTPATIENT
Start: 2025-04-07 | End: 2025-04-11 | Stop reason: HOSPADM

## 2025-04-07 RX ORDER — LIDOCAINE 40 MG/G
CREAM TOPICAL
Status: DISCONTINUED | OUTPATIENT
Start: 2025-04-07 | End: 2025-04-11 | Stop reason: HOSPADM

## 2025-04-07 RX ORDER — METHYLPREDNISOLONE SODIUM SUCCINATE 40 MG/ML
20 INJECTION INTRAMUSCULAR; INTRAVENOUS EVERY 8 HOURS
Status: DISCONTINUED | OUTPATIENT
Start: 2025-04-07 | End: 2025-04-11 | Stop reason: HOSPADM

## 2025-04-07 RX ORDER — ONDANSETRON 2 MG/ML
4 INJECTION INTRAMUSCULAR; INTRAVENOUS EVERY 6 HOURS PRN
Status: DISCONTINUED | OUTPATIENT
Start: 2025-04-07 | End: 2025-04-11 | Stop reason: HOSPADM

## 2025-04-07 RX ORDER — HYDROMORPHONE HCL IN WATER/PF 6 MG/30 ML
0.4 PATIENT CONTROLLED ANALGESIA SYRINGE INTRAVENOUS
Status: DISCONTINUED | OUTPATIENT
Start: 2025-04-07 | End: 2025-04-11 | Stop reason: HOSPADM

## 2025-04-07 RX ORDER — ACETAMINOPHEN 650 MG/1
650 SUPPOSITORY RECTAL EVERY 4 HOURS PRN
Status: DISCONTINUED | OUTPATIENT
Start: 2025-04-07 | End: 2025-04-11 | Stop reason: HOSPADM

## 2025-04-07 RX ORDER — HYDROMORPHONE HYDROCHLORIDE 1 MG/ML
0.5 INJECTION, SOLUTION INTRAMUSCULAR; INTRAVENOUS; SUBCUTANEOUS ONCE
Status: COMPLETED | OUTPATIENT
Start: 2025-04-07 | End: 2025-04-07

## 2025-04-07 RX ORDER — LORAZEPAM 2 MG/ML
0.5 INJECTION INTRAMUSCULAR ONCE
Status: COMPLETED | OUTPATIENT
Start: 2025-04-07 | End: 2025-04-07

## 2025-04-07 RX ORDER — ACETAMINOPHEN 325 MG/1
650 TABLET ORAL EVERY 4 HOURS PRN
Status: DISCONTINUED | OUTPATIENT
Start: 2025-04-07 | End: 2025-04-11 | Stop reason: HOSPADM

## 2025-04-07 RX ORDER — IOPAMIDOL 755 MG/ML
90 INJECTION, SOLUTION INTRAVASCULAR ONCE
Status: COMPLETED | OUTPATIENT
Start: 2025-04-07 | End: 2025-04-07

## 2025-04-07 RX ORDER — PREDNISONE 5 MG/1
TABLET ORAL DAILY
Status: ON HOLD | COMMUNITY
End: 2025-04-11

## 2025-04-07 RX ORDER — ONDANSETRON 4 MG/1
4 TABLET, ORALLY DISINTEGRATING ORAL EVERY 6 HOURS PRN
Status: DISCONTINUED | OUTPATIENT
Start: 2025-04-07 | End: 2025-04-11

## 2025-04-07 RX ADMIN — HYDROMORPHONE HYDROCHLORIDE 1 MG: 1 INJECTION, SOLUTION INTRAMUSCULAR; INTRAVENOUS; SUBCUTANEOUS at 12:14

## 2025-04-07 RX ADMIN — ACETAMINOPHEN 650 MG: 325 TABLET, FILM COATED ORAL at 14:31

## 2025-04-07 RX ADMIN — HYDROMORPHONE HYDROCHLORIDE 0.5 MG: 1 INJECTION, SOLUTION INTRAMUSCULAR; INTRAVENOUS; SUBCUTANEOUS at 08:51

## 2025-04-07 RX ADMIN — METHYLPREDNISOLONE SODIUM SUCCINATE 20 MG: 40 INJECTION, POWDER, FOR SOLUTION INTRAMUSCULAR; INTRAVENOUS at 12:13

## 2025-04-07 RX ADMIN — METHYLPREDNISOLONE SODIUM SUCCINATE 20 MG: 40 INJECTION, POWDER, FOR SOLUTION INTRAMUSCULAR; INTRAVENOUS at 18:16

## 2025-04-07 RX ADMIN — LORAZEPAM 0.5 MG: 2 INJECTION INTRAMUSCULAR; INTRAVENOUS at 22:32

## 2025-04-07 RX ADMIN — HYDROMORPHONE HYDROCHLORIDE 0.2 MG: 0.2 INJECTION, SOLUTION INTRAMUSCULAR; INTRAVENOUS; SUBCUTANEOUS at 14:32

## 2025-04-07 RX ADMIN — HYDROMORPHONE HYDROCHLORIDE 0.4 MG: 0.2 INJECTION, SOLUTION INTRAMUSCULAR; INTRAVENOUS; SUBCUTANEOUS at 18:16

## 2025-04-07 RX ADMIN — HYDROMORPHONE HYDROCHLORIDE 0.4 MG: 0.2 INJECTION, SOLUTION INTRAMUSCULAR; INTRAVENOUS; SUBCUTANEOUS at 23:03

## 2025-04-07 RX ADMIN — ONDANSETRON 4 MG: 2 INJECTION, SOLUTION INTRAMUSCULAR; INTRAVENOUS at 08:49

## 2025-04-07 RX ADMIN — IOPAMIDOL 90 ML: 755 INJECTION, SOLUTION INTRAVENOUS at 10:25

## 2025-04-07 RX ADMIN — SODIUM CHLORIDE 1000 ML: 9 INJECTION, SOLUTION INTRAVENOUS at 09:00

## 2025-04-07 ASSESSMENT — COLUMBIA-SUICIDE SEVERITY RATING SCALE - C-SSRS
2. HAVE YOU ACTUALLY HAD ANY THOUGHTS OF KILLING YOURSELF IN THE PAST MONTH?: NO
1. IN THE PAST MONTH, HAVE YOU WISHED YOU WERE DEAD OR WISHED YOU COULD GO TO SLEEP AND NOT WAKE UP?: NO
6. HAVE YOU EVER DONE ANYTHING, STARTED TO DO ANYTHING, OR PREPARED TO DO ANYTHING TO END YOUR LIFE?: NO

## 2025-04-07 ASSESSMENT — ACTIVITIES OF DAILY LIVING (ADL)
ADLS_ACUITY_SCORE: 52
ADLS_ACUITY_SCORE: 56
ADLS_ACUITY_SCORE: 52
ADLS_ACUITY_SCORE: 56
ADLS_ACUITY_SCORE: 52
ADLS_ACUITY_SCORE: 56
ADLS_ACUITY_SCORE: 52
ADLS_ACUITY_SCORE: 52
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
## 2025-04-07 NOTE — PLAN OF CARE
Problem: Adult Inpatient Plan of Care  Goal: Absence of Hospital-Acquired Illness or Injury  Intervention: Identify and Manage Fall Risk  Recent Flowsheet Documentation  Taken 4/7/2025 1643 by Yohana Harper RN  Safety Promotion/Fall Prevention:   activity supervised   clutter free environment maintained     Problem: Adult Inpatient Plan of Care  Goal: Absence of Hospital-Acquired Illness or Injury  Intervention: Prevent Skin Injury  Recent Flowsheet Documentation  Taken 4/7/2025 1643 by Yohana Harper RN  Body Position: position changed independently   Goal Outcome Evaluation:      Plan of Care Reviewed With: patient    Overall Patient Progress: improvingOverall Patient Progress: improving  Pt alert and oriented, vss on room air, can voice needs, pt is independent, has mild abdominal pain, still awaiting  stool sample, enteric precautions maintained. Pt extremely agitated and very argumentative,  pulled out iv and asked to be discharge asap,  tried to explain rationale for her staying in hospital but she wouldn't listen to me, I paged the doctor  who came in and told her that she can not be discharge, he ordered a dose of ativan.

## 2025-04-07 NOTE — ED NOTES
Writer assumed patient cares. Introduced self, whiteboard updated. Plan of care discussed, all questions answered. Alert and oriented x4. GCS 15.  VSS on RA. Sitting in bed, drinking broth soup. Patient reports abd pain 4/10, a lot better after IV Dilaudid was given. Bed in low position, call light within reach.     Left AC PIV

## 2025-04-07 NOTE — ED NOTES
Patient admitting to , room 3116. Transferred via wheelchair accompanied by EDT. Patient belongings sent with patient. All questions answered.

## 2025-04-07 NOTE — ED TRIAGE NOTES
Patient states for the past 4 years she' had bright red rectal bleeding, she recently had an EGD/Colonoscopy and was put on Prednisone, she's had 1 infusion in past which helped but currently is not prescribed them. She has increased bleeding now.      Triage Assessment (Adult)       Row Name 04/07/25 0748          Triage Assessment    Airway WDL WDL        Respiratory WDL    Respiratory WDL WDL        Skin Circulation/Temperature WDL    Skin Circulation/Temperature WDL WDL        Cardiac WDL    Cardiac WDL WDL        Peripheral/Neurovascular WDL    Peripheral Neurovascular WDL WDL        Cognitive/Neuro/Behavioral WDL    Cognitive/Neuro/Behavioral WDL WDL

## 2025-04-07 NOTE — ED NOTES
Pt requesting something to eat talked with provider and he advised NPO for now. Advised pt. Pt states she can't have a bowel movement without eating something.
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
## 2025-04-08 LAB
ADV 40+41 DNA STL QL NAA+NON-PROBE: NEGATIVE
ASTRO TYP 1-8 RNA STL QL NAA+NON-PROBE: NEGATIVE
C CAYETANENSIS DNA STL QL NAA+NON-PROBE: NEGATIVE
C DIFF TOX B STL QL: NEGATIVE
CAMPYLOBACTER DNA SPEC NAA+PROBE: NEGATIVE
CRYPTOSP DNA STL QL NAA+NON-PROBE: NEGATIVE
E COLI O157 DNA STL QL NAA+NON-PROBE: NORMAL
E HISTOLYT DNA STL QL NAA+NON-PROBE: NEGATIVE
EAEC ASTA GENE ISLT QL NAA+PROBE: NEGATIVE
EC STX1+STX2 GENES STL QL NAA+NON-PROBE: NEGATIVE
EPEC EAE GENE STL QL NAA+NON-PROBE: NEGATIVE
ETEC LTA+ST1A+ST1B TOX ST NAA+NON-PROBE: NEGATIVE
G LAMBLIA DNA STL QL NAA+NON-PROBE: NEGATIVE
HGB BLD-MCNC: 8.2 G/DL (ref 11.7–15.7)
NOROVIRUS GI+II RNA STL QL NAA+NON-PROBE: NEGATIVE
P SHIGELLOIDES DNA STL QL NAA+NON-PROBE: NEGATIVE
RVA RNA STL QL NAA+NON-PROBE: NEGATIVE
SALMONELLA SP RPOD STL QL NAA+PROBE: NEGATIVE
SAPO I+II+IV+V RNA STL QL NAA+NON-PROBE: NEGATIVE
SHIGELLA SP+EIEC IPAH ST NAA+NON-PROBE: NEGATIVE
V CHOLERAE DNA SPEC QL NAA+PROBE: NEGATIVE
VIBRIO DNA SPEC NAA+PROBE: NEGATIVE
Y ENTEROCOL DNA STL QL NAA+PROBE: NEGATIVE

## 2025-04-08 PROCEDURE — 250N000011 HC RX IP 250 OP 636: Performed by: PHYSICIAN ASSISTANT

## 2025-04-08 PROCEDURE — 120N000001 HC R&B MED SURG/OB

## 2025-04-08 PROCEDURE — 85018 HEMOGLOBIN: CPT | Performed by: FAMILY MEDICINE

## 2025-04-08 PROCEDURE — 250N000011 HC RX IP 250 OP 636: Mod: JZ | Performed by: FAMILY MEDICINE

## 2025-04-08 PROCEDURE — 36415 COLL VENOUS BLD VENIPUNCTURE: CPT | Performed by: FAMILY MEDICINE

## 2025-04-08 PROCEDURE — 99232 SBSQ HOSP IP/OBS MODERATE 35: CPT | Performed by: FAMILY MEDICINE

## 2025-04-08 RX ORDER — LORAZEPAM 2 MG/ML
0.5 INJECTION INTRAMUSCULAR EVERY 6 HOURS PRN
Status: DISCONTINUED | OUTPATIENT
Start: 2025-04-08 | End: 2025-04-11 | Stop reason: HOSPADM

## 2025-04-08 RX ORDER — ENOXAPARIN SODIUM 100 MG/ML
40 INJECTION SUBCUTANEOUS EVERY 24 HOURS
Status: DISCONTINUED | OUTPATIENT
Start: 2025-04-08 | End: 2025-04-11 | Stop reason: HOSPADM

## 2025-04-08 RX ADMIN — HYDROMORPHONE HYDROCHLORIDE 0.4 MG: 0.2 INJECTION, SOLUTION INTRAMUSCULAR; INTRAVENOUS; SUBCUTANEOUS at 18:10

## 2025-04-08 RX ADMIN — ENOXAPARIN SODIUM 40 MG: 40 INJECTION SUBCUTANEOUS at 10:31

## 2025-04-08 RX ADMIN — ONDANSETRON 4 MG: 2 INJECTION INTRAMUSCULAR; INTRAVENOUS at 08:49

## 2025-04-08 RX ADMIN — METHYLPREDNISOLONE SODIUM SUCCINATE 20 MG: 40 INJECTION, POWDER, FOR SOLUTION INTRAMUSCULAR; INTRAVENOUS at 18:10

## 2025-04-08 RX ADMIN — HYDROMORPHONE HYDROCHLORIDE 0.4 MG: 0.2 INJECTION, SOLUTION INTRAMUSCULAR; INTRAVENOUS; SUBCUTANEOUS at 03:36

## 2025-04-08 RX ADMIN — HYDROMORPHONE HYDROCHLORIDE 0.4 MG: 0.2 INJECTION, SOLUTION INTRAMUSCULAR; INTRAVENOUS; SUBCUTANEOUS at 14:20

## 2025-04-08 RX ADMIN — METHYLPREDNISOLONE SODIUM SUCCINATE 20 MG: 40 INJECTION, POWDER, FOR SOLUTION INTRAMUSCULAR; INTRAVENOUS at 10:31

## 2025-04-08 RX ADMIN — HYDROMORPHONE HYDROCHLORIDE 0.4 MG: 0.2 INJECTION, SOLUTION INTRAMUSCULAR; INTRAVENOUS; SUBCUTANEOUS at 08:48

## 2025-04-08 RX ADMIN — METHYLPREDNISOLONE SODIUM SUCCINATE 20 MG: 40 INJECTION, POWDER, FOR SOLUTION INTRAMUSCULAR; INTRAVENOUS at 03:40

## 2025-04-08 RX ADMIN — HYDROMORPHONE HYDROCHLORIDE 0.4 MG: 0.2 INJECTION, SOLUTION INTRAMUSCULAR; INTRAVENOUS; SUBCUTANEOUS at 20:27

## 2025-04-08 ASSESSMENT — ACTIVITIES OF DAILY LIVING (ADL)
ADLS_ACUITY_SCORE: 31
ADLS_ACUITY_SCORE: 31
ADLS_ACUITY_SCORE: 52
ADLS_ACUITY_SCORE: 31
ADLS_ACUITY_SCORE: 52
ADLS_ACUITY_SCORE: 31
ADLS_ACUITY_SCORE: 52
ADLS_ACUITY_SCORE: 31
ADLS_ACUITY_SCORE: 52
ADLS_ACUITY_SCORE: 31
ADLS_ACUITY_SCORE: 31

## 2025-04-08 NOTE — PROGRESS NOTES
[facility] MEDICINE  PROGRESS NOTE     Code Status: Full Code       Assessment and Plan:  Nicki Mckeon is a 44 year old female with PMH signficant for ulcerative colitis, TBI, anxiety, distant history of alcohol abuse in remission.  Works with [employer].  Receiving infliximab every 6 weeks most recently 3/7/2025.  On 4/3 underwent EGD showing chronic gastric inflammation negative for H. pylori and colonoscopy with severe inflammation throughout the colon with large deep ulcers.  Taking oral prednisone but abdominal pain 10 out of 10 and small-volume bloody diarrhea continued.  4/7/2025 seen in the ED.  Hemoglobin 9.1.  CT scan showed diffuse colonic thickening consistent with colitis flare.  [place] [facility] recommended admission and initiation of IV Solu-Medrol 20 mg IV every 8 hours.  4/8 clinically feeling substantially better.  Monitor overnight.  Potential discharge 4/9.        Acute ulcerative colitis flare  CT scan findings as above.  Clinically feeling substantially better after IV steroids.  Continue IV Solu-Medrol 20 mg IV every 8 hours.  Continue MiraLAX 17 g daily  Low fiber diet.  Enteric and C. difficile testing negative.  Dilaudid and Zofran as needed.  Appreciate [facility] consultation.  If continues to do well potential discharge 4/8.     Chronic anemia  Admission hemoglobin 9.1.  Similar to previous baseline.  Consent obtained to transfuse if needed.    Recheck hemoglobin 8.2.  Follow daily hemoglobin.     Leukocytosis  White count mildly elevated 12.9.  Would not recheck as she is receiving high-dose steroids.     Pseudohyperkalemia  Initial potassium elevated at 5.4.  Specimen was hemolyzed.  Recheck potassium 4.7 with a new draw.     History of TBI  Anxiety  Alcohol abuse in remission  Noted.  Patient is not on active antidepressants at this time.     Anticoagulation   Enoxaparin (Lovenox) SQ 40 mg daily per GI.    Fluids: Saline lock  Pain meds: Tylenol  "and Dilaudid as needed  Therapy: N/A  [place]:Not present  Lines: None       Current Diet  Orders Placed This Encounter      Low Fiber Diet    Supplements  None    Lines/Drains/Airways       PIV Line  Duration             Peripheral IV 04/07/25 Right Upper forearm <1 day                  Barriers to Discharge: Intravenous steroids, monitoring overnight and continue diet    Disposition: Potential discharge 4/9 if doing well  Medically Ready for Discharge: Anticipated Tomorrow       Clinically Significant Risk Factors Present on Admission        # Hyperkalemia: Highest K = 5.4 mmol/L in last 2 days, will monitor as appropriate                 # Anemia: based on hgb <11       # Overweight: Estimated body mass index is 25.69 kg/m  as calculated from the following:    Height as of this encounter: 1.6 m (5' 3\").    Weight as of this encounter: 65.8 kg (145 lb).              Interval History/Subjective:  This morning patient had severe 11 out of 10 abdominal pain but after receiving intravenous pain meds and steroids her pain has improved.  Did tolerate eggs and verdin for brunch today.  States her pain is now a 4 out of 10.  Feels like her stools are forming up.  Pleased with how things are going.  Agreeable to staying overnight and hopeful she can discharge home tomorrow.  Questions answered to verbalized satisfaction.      Last 24H PRN:     acetaminophen (TYLENOL) tablet 650 mg, 650 mg at 04/07/25 1431 **OR** acetaminophen (TYLENOL) Suppository 650 mg    HYDROmorphone (DILAUDID) injection 0.2 mg, 0.2 mg at 04/07/25 1432    HYDROmorphone (DILAUDID) injection 0.4 mg, 0.4 mg at 04/08/25 0848    ondansetron (ZOFRAN ODT) ODT tab 4 mg **OR** ondansetron (ZOFRAN) injection 4 mg, 4 mg at 04/08/25 0849    Physical Exam/Objective:  Temp:  [97.4  F (36.3  C)-97.5  F (36.4  C)] 97.5  F (36.4  C)  Pulse:  [76-97] 76  Resp:  [16] 16  BP: (104-138)/(63-89) 132/72  SpO2:  [94 %-100 %] 97 %  Wt Readings from Last 4 Encounters:   04/07/25 " 65.8 kg (145 lb)   02/14/24 63.5 kg (140 lb)   10/16/23 64.9 kg (143 lb 1.6 oz)   10/09/23 65.5 kg (144 lb 4.8 oz)     Body mass index is 25.69 kg/m .    Constitutional: awake, alert, cooperative, no apparent distress, and appears stated age and stable pressured speech  ENT: Normocephalic, without obvious abnormality, atraumatic, external ears without lesions, oral pharynx with moist mucous membranes, tonsils without erythema or exudates, gums normal and good dentition.  Respiratory: No increased work of breathing, good air exchange, clear to auscultation bilaterally, no crackles or wheezing  Cardiovascular: Normal apical impulse, regular rate and rhythm, normal S1 and S2, no S3 or S4, and no murmur noted  GI: Hyperactive bowel sounds soft nondistended.  Mild diffuse tenderness.  Significantly improved from prior exam.  Skin: normal skin color, texture, turgor, no redness, warmth, or swelling, and no rashes  Musculoskeletal: There is no redness, warmth, or swelling of the joints.  Motor strength is 5 out of 5 all extremities bilaterally.  Tone is normal. no lower extremity pitting edema present  Neurologic: Cranial nerves II-XII are grossly intact. Sensory:  Sensory intact  Neuropsychiatric: General: normal, calm, and normal eye contact Level of consciousness: alert / normal Affect: normal and full Orientation: oriented to self, place, time and situation Memory and insight: normal, memory for past and recent events intact, thought process normal, and stable pressured speech      Medications:   Personally Reviewed.  Medications   Current Facility-Administered Medications   Medication Dose Route Frequency Provider Last Rate Last Admin    Patient is already receiving anticoagulation with heparin, enoxaparin (LOVENOX), warfarin (COUMADIN)  or other anticoagulant medication   Does not apply Continuous PRN Mauricio Garcia MD         Current Facility-Administered Medications   Medication Dose Route Frequency Provider  Last Rate Last Admin    enoxaparin ANTICOAGULANT (LOVENOX) injection 40 mg  40 mg Subcutaneous Q24H DarDaniela walker PA-C   40 mg at 04/08/25 1031    methylPREDNISolone sodium succinate (SOLU-MEDROL) injection 20 mg  20 mg Intravenous Q8H Mauricio Garcia MD   20 mg at 04/08/25 1031    sodium chloride (PF) 0.9% PF flush 3 mL  3 mL Intracatheter Q8H [facility] Mauricio Garcia MD   3 mL at 04/07/25 6362       Data reviewed today: I personally reviewed all new medications, labs, imaging/diagnostics reports over the past 24 hours. Pertinent findings include:    Imaging:   No results found for this or any previous visit (from the past 24 hours).  No results found for this or any previous visit (from the past 4320 hours).    Labs:  CT Abdomen Pelvis w Contrast   Final Result   IMPRESSION:    1.  Diffuse colonic wall thickening, likely related to known ulcerative colitis. Thickening is decreased compared to 2/14/2024. No free air or intra-abdominal fluid collection.   2.  Nonobstructing nephrolithiasis.           Recent Results (from the past 24 hours)   Potassium    Collection Time: 04/07/25  3:36 PM   Result Value Ref Range    Potassium 4.7 3.4 - 5.3 mmol/L   Enteric Bacteria and Virus Panel PCR    Collection Time: 04/07/25 10:51 PM    Specimen: Per Rectum; Stool   Result Value Ref Range    Campylobacter species Negative Negative    Salmonella species Negative Negative    Vibrio species Negative Negative    Vibrio cholerae Negative Negative    Yersinia enterocolitica Negative Negative    Enteropathogenic E. coli (EPEC) Negative Negative, NA    Shiga-like toxin-producing E. coli (STEC) Negative Negative    Shigella/Enteroinvasive E. coli (EIEC) Negative Negative    Cryptosporidium species Negative Negative    Giardia lamblia Negative Negative    Norovirus Gl/Gll Negative Negative    Rotavirus A Negative Negative    Plesiomonas shigelloides Negative Negative    Enteroaggregative E. coli (EAEC) Negative Negative     Enterotoxigenic E. coli (ETEC) Negative Negative    E. coli O157 NA Negative, NA    Cyclospora cayetanensis Negative Negative    Entamoeba histolytica Negative Negative    Adenovirus F40/41 Negative Negative    Astrovirus Negative Negative    Sapovirus Negative Negative   C. difficile Toxin B PCR with reflex to C. difficile EIA    Collection Time: 04/07/25 10:51 PM    Specimen: Per Rectum; Stool   Result Value Ref Range    C Difficile Toxin B by PCR Negative Negative   Hemoglobin    Collection Time: 04/08/25  7:05 AM   Result Value Ref Range    Hemoglobin 8.2 (L) 11.7 - 15.7 g/dL       Pending Labs:  Unresulted Labs Ordered in the Past 30 Days of this Admission       No orders found for last 31 day(s).              Mauricio Garcia MD  [facility]  [facility]  [facility]  Phone: #[phone]

## 2025-04-08 NOTE — PLAN OF CARE
Problem: Adult Inpatient Plan of Care  Goal: Plan of Care Review  Outcome: Progressing   Problem: Adult Inpatient Plan of Care  Goal: Optimal Comfort and Wellbeing  Intervention: Monitor Pain and Promote Comfort  Recent Flowsheet Documentation  Taken 4/8/2025 0059 by Katherine Matt RN  Pain Management Interventions: declines   Goal Outcome Evaluation:  Patient is alert and oriented X 4. Utilized PRN IV Dilaudid for pain control. Was up to the bathroom independently and voided. IV saline locked. VSS on RA. Call light within reach.

## 2025-04-08 NOTE — PROGRESS NOTES
"[facility] Progress Note     SUBJECTIVE:    The patient reports feeling improved this morning, noting she is having significantly less rectal bleeding, and her abdominal pain is better since starting the prednisone. She is still having significant abdominal pain, but is unsure if it is from being so hungry, and she questions if she can eat.     OBJECTIVE:  /63 (BP Location: Left arm)   Pulse 76   Temp 97.5  F (36.4  C) (Oral)   Resp 16   Ht 1.6 m (5' 3\")   Wt 65.8 kg (145 lb)   SpO2 96%   BMI 25.69 kg/m    Temp (24hrs), Av.5  F (36.4  C), Min:97.4  F (36.3  C), Max:97.5  F (36.4  C)    Patient Vitals for the past 72 hrs:   Weight   25 0747 65.8 kg (145 lb)       Intake/Output Summary (Last 24 hours) at 2025 0816  Last data filed at 2025 0550  Gross per 24 hour   Intake 1857 ml   Output 475 ml   Net 1382 ml     PHYSICAL EXAM  GEN: Resting comfortably in bed  HRT: appears well perfused  RESP: unlabored  ABD: Soft and mildly tender across lower abdomen  SKIN: Visualized skin intact, no rash    Additional Data:  I have reviewed the patient's new clinical lab results:     Recent Labs   Lab Test 25  0705 25  0824 24  0954 10/19/23  0556 10/18/23  0715 22  2152 22  0822 19  1233 19  1233   WBC  --  12.8* 7.5  --  17.0*   < > 7.4   < >  --    HGB 8.2* 9.1* 8.6* 9.4* 9.6*   < > 6.8*   < >  --    MCV  --  79 75*  --  77*   < > 71*   < >  --    PLT  --  630* 357 569* 559*   < > 452*   < >  --    INR  --   --   --   --   --   --  1.03  --  1.08    < > = values in this interval not displayed.     Recent Labs   Lab Test 25  1536 25  0824 24  0954 10/16/23  1019   POTASSIUM 4.7 5.4* 3.5 4.0   CHLORIDE  --  98 106 99   CO2  --     BUN  --  14.0 15.3 23.3*   ANIONGAP  --  13 11 11     Recent Labs   Lab Test 25  0925 25  0824 24  0954 10/16/23  1019 10/09/23  0017 10/08/23  2122 22  0822 19  1233 "   ALBUMIN  --  3.5 3.9 4.1  --  3.6   < >  --    BILITOTAL  --  0.3 0.3 <0.2  --  <0.2   < >  --    ALT  --   --  12 15  --  10   < >  --    AST  --   --  20 14  --  22   < >  --    PROTEIN  --   --   --   --  70*  --   --  100 mg/dL*   LIPASE 60  --   --  48  --  42  --   --     < > = values in this interval not displayed.     Imaging results:  EXAM: CT ABDOMEN PELVIS W CONTRAST  LOCATION: [facility]  DATE: 4/7/2025     INDICATION: Diffuse abdominal pain, recent colonoscopy  COMPARISON: CT abdomen pelvis 2/14/2024  TECHNIQUE: CT scan of the abdomen and pelvis was performed following injection of IV contrast. Multiplanar reformats were obtained. Dose reduction techniques were used.  CONTRAST: 90 mL iopamidol     FINDINGS:   LOWER CHEST: Right basilar atelectasis adjacent to healed right posterior rib fractures.     HEPATOBILIARY: Normal liver contours. Subcentimeter benign hemangioma in segment 2 (3/23) is unchanged. No worrisome liver lesions. Normal gallbladder.     PANCREAS: Normal.     SPLEEN: Normal.     ADRENAL GLANDS: Normal.     KIDNEYS/BLADDER: Kidneys enhance symmetrically with unchanged benign left renal cyst. Punctate nonobstructing nephrolithiasis bilaterally, measuring up to 4 mm in the right upper and 2 mm in the left lower poles. No ureteral stones or hydronephrosis.   Urinary bladder is unremarkable.     BOWEL: Colonic wall thickening of the transverse, descending, and rectosigmoid colon is decreased from 2/14/2024 and likely related to known ulcerative colitis. Normal appendix. No free fluid or free air.     LYMPH NODES: No lymphadenopathy.     VASCULATURE: The abdominal aorta is nonaneurysmal.     PELVIC ORGANS: Uterus is retroflexed. No adnexal mass.     MUSCULOSKELETAL: No acute osseous abnormality.                                                                 IMPRESSION:   1.  Diffuse colonic wall thickening, likely related to known ulcerative colitis. Thickening is  decreased compared to 2/14/2024. No free air or intra-abdominal fluid collection.  2.  Nonobstructing nephrolithiasis.     ASSESSMENT:      Abdominal pain  Rectal bleeding  Ulcerative colitis with flare  Nicki Mckeon is a 44 year old female with a PMH significant for TBI, ulcerative colitis, and chronic anemia, who presented to the hospital for abdominal pain and rectal bleeding.      Known history of ulcerative colitis, currently on infliximab infusions, just increased from every 8 weeks to every 6 weeks without improvement, even with the addition of budesonide.  Colonoscopy last week with severe inflammation consistent with active ulcerative colitis.  At that time she was started on prednisone, but had no improvement with this over the last 4 days, prompting her presentation to the hospital.     Plan will be to start the patient on 20 mg IV methylprednisolone every 8 hours.  Will continue to see how she progresses, and transition to oral steroids when we start to see improvement.  Will need close follow-up with IBD provider at [facility] to discuss treatment options, including increasing the dose, frequency, or switching to a new medication.     4. Anemia  History of chronic anemia, stable upon admission, with drop from 9.1-8.2 the day following admission. Could be dilutional or delayed, as rectal bleeding has decreased and she is feeling improved.     PLAN:  -IV methylprednisolone 20 mg every 8 hours  -VTE prophylaxis with lovenox 40 mg SQ daily  -Pain and nausea control per hospital medicine  -MiraLAX 17 g daily  -Monitor hemoglobin, transfuse as needed <7  -Infectious stool studies pending  -Follow up with [facility] IBD provider in the next several weeks, we'll call to arrange  -Will continue to follow    (Dr. Henderson)  Daniela Muñoz PA-C  [facility]  4/8/2025 8:16 AM  [phone] (office)    28 minutes of total time was spent providing patient care, including patient evaluation, reviewing documentation/test  results, , and documentation.  ________________________________________________________________________

## 2025-04-08 NOTE — PLAN OF CARE
Problem: Adult Inpatient Plan of Care  Goal: Plan of Care Review  Description: The Plan of Care Review/Shift note should be completed every shift.  The Outcome Evaluation is a brief statement about your assessment that the patient is improving, declining, or no change.  This information will be displayed automatically on your shiftnote.  Outcome: Progressing   Goal Outcome Evaluation:  Reported severe abdominal pain this morning with nausea. Relief noted after IV Dilaudid and Zofran. Diet advanced to low fiber per GI. Potential discharge home tomorrow pending progression

## 2025-04-08 NOTE — PROGRESS NOTES
I was notified by nursing staff that patient wanted to be discharged. I went over to see the patient. She states that she was feeling better earlier. But now she does not want to discharge anymore.     She appears anxious with pressured speech.     One time dose of ativan 0.5 mg IV ordered.

## 2025-04-09 LAB
HGB BLD-MCNC: 8.6 G/DL (ref 11.7–15.7)
HOLD SPECIMEN: NORMAL

## 2025-04-09 PROCEDURE — 85018 HEMOGLOBIN: CPT | Performed by: FAMILY MEDICINE

## 2025-04-09 PROCEDURE — 250N000013 HC RX MED GY IP 250 OP 250 PS 637: Performed by: FAMILY MEDICINE

## 2025-04-09 PROCEDURE — 120N000001 HC R&B MED SURG/OB

## 2025-04-09 PROCEDURE — 99232 SBSQ HOSP IP/OBS MODERATE 35: CPT | Performed by: FAMILY MEDICINE

## 2025-04-09 PROCEDURE — 250N000011 HC RX IP 250 OP 636: Performed by: PHYSICIAN ASSISTANT

## 2025-04-09 PROCEDURE — 250N000013 HC RX MED GY IP 250 OP 250 PS 637: Performed by: PHYSICIAN ASSISTANT

## 2025-04-09 PROCEDURE — 250N000011 HC RX IP 250 OP 636: Performed by: FAMILY MEDICINE

## 2025-04-09 PROCEDURE — 36415 COLL VENOUS BLD VENIPUNCTURE: CPT | Performed by: FAMILY MEDICINE

## 2025-04-09 RX ORDER — NALOXONE HYDROCHLORIDE 0.4 MG/ML
0.2 INJECTION, SOLUTION INTRAMUSCULAR; INTRAVENOUS; SUBCUTANEOUS
Status: DISCONTINUED | OUTPATIENT
Start: 2025-04-09 | End: 2025-04-11 | Stop reason: HOSPADM

## 2025-04-09 RX ORDER — NALOXONE HYDROCHLORIDE 0.4 MG/ML
0.4 INJECTION, SOLUTION INTRAMUSCULAR; INTRAVENOUS; SUBCUTANEOUS
Status: DISCONTINUED | OUTPATIENT
Start: 2025-04-09 | End: 2025-04-11 | Stop reason: HOSPADM

## 2025-04-09 RX ORDER — ESCITALOPRAM OXALATE 10 MG/1
10 TABLET ORAL DAILY
Status: DISCONTINUED | OUTPATIENT
Start: 2025-04-09 | End: 2025-04-11 | Stop reason: HOSPADM

## 2025-04-09 RX ORDER — LORAZEPAM 0.5 MG/1
0.5 TABLET ORAL EVERY 4 HOURS PRN
Status: DISCONTINUED | OUTPATIENT
Start: 2025-04-09 | End: 2025-04-11 | Stop reason: HOSPADM

## 2025-04-09 RX ORDER — HYDROMORPHONE HYDROCHLORIDE 2 MG/1
2 TABLET ORAL EVERY 4 HOURS PRN
Status: DISCONTINUED | OUTPATIENT
Start: 2025-04-09 | End: 2025-04-11 | Stop reason: HOSPADM

## 2025-04-09 RX ORDER — POLYETHYLENE GLYCOL 3350 17 G/17G
17 POWDER, FOR SOLUTION ORAL DAILY
Status: DISCONTINUED | OUTPATIENT
Start: 2025-04-09 | End: 2025-04-10

## 2025-04-09 RX ADMIN — HYDROMORPHONE HYDROCHLORIDE 0.4 MG: 0.2 INJECTION, SOLUTION INTRAMUSCULAR; INTRAVENOUS; SUBCUTANEOUS at 18:09

## 2025-04-09 RX ADMIN — HYDROMORPHONE HYDROCHLORIDE 0.4 MG: 0.2 INJECTION, SOLUTION INTRAMUSCULAR; INTRAVENOUS; SUBCUTANEOUS at 06:41

## 2025-04-09 RX ADMIN — HYDROMORPHONE HYDROCHLORIDE 0.4 MG: 0.2 INJECTION, SOLUTION INTRAMUSCULAR; INTRAVENOUS; SUBCUTANEOUS at 13:13

## 2025-04-09 RX ADMIN — METHYLPREDNISOLONE SODIUM SUCCINATE 20 MG: 40 INJECTION, POWDER, FOR SOLUTION INTRAMUSCULAR; INTRAVENOUS at 10:07

## 2025-04-09 RX ADMIN — HYDROMORPHONE HYDROCHLORIDE 2 MG: 2 TABLET ORAL at 08:50

## 2025-04-09 RX ADMIN — POLYETHYLENE GLYCOL 3350 17 G: 17 POWDER, FOR SOLUTION ORAL at 10:36

## 2025-04-09 RX ADMIN — METHYLPREDNISOLONE SODIUM SUCCINATE 20 MG: 40 INJECTION, POWDER, FOR SOLUTION INTRAMUSCULAR; INTRAVENOUS at 18:08

## 2025-04-09 RX ADMIN — METHYLPREDNISOLONE SODIUM SUCCINATE 20 MG: 40 INJECTION, POWDER, FOR SOLUTION INTRAMUSCULAR; INTRAVENOUS at 03:35

## 2025-04-09 RX ADMIN — ESCITALOPRAM OXALATE 10 MG: 10 TABLET ORAL at 10:07

## 2025-04-09 RX ADMIN — HYDROMORPHONE HYDROCHLORIDE 0.4 MG: 0.2 INJECTION, SOLUTION INTRAMUSCULAR; INTRAVENOUS; SUBCUTANEOUS at 00:57

## 2025-04-09 RX ADMIN — ONDANSETRON 4 MG: 2 INJECTION INTRAMUSCULAR; INTRAVENOUS at 13:13

## 2025-04-09 RX ADMIN — ENOXAPARIN SODIUM 40 MG: 40 INJECTION SUBCUTANEOUS at 08:50

## 2025-04-09 ASSESSMENT — ACTIVITIES OF DAILY LIVING (ADL)
ADLS_ACUITY_SCORE: 31
ADLS_ACUITY_SCORE: 33
ADLS_ACUITY_SCORE: 31
ADLS_ACUITY_SCORE: 31
ADLS_ACUITY_SCORE: 33
ADLS_ACUITY_SCORE: 33
ADLS_ACUITY_SCORE: 31
ADLS_ACUITY_SCORE: 31
ADLS_ACUITY_SCORE: 33
ADLS_ACUITY_SCORE: 33
ADLS_ACUITY_SCORE: 31
ADLS_ACUITY_SCORE: 33
ADLS_ACUITY_SCORE: 31

## 2025-04-09 NOTE — PROGRESS NOTES
[facility] MEDICINE  PROGRESS NOTE     Code Status: Full Code       Assessment and Plan:  Nicki Mckeon is a 44 year old female with PMH signficant for ulcerative colitis, TBI, anxiety, distant history of alcohol abuse in remission.  Works with [employer].  Receiving infliximab every 6 weeks most recently 3/7/2025.  On 4/3 underwent EGD showing chronic gastric inflammation negative for H. pylori and colonoscopy with severe inflammation throughout the colon with large deep ulcers.  Taking oral prednisone but abdominal pain 10 out of 10 and small-volume bloody diarrhea continued.  4/7/2025 seen in the ED.  Hemoglobin 9.1.  CT scan showed diffuse colonic thickening consistent with colitis flare.  [place] [facility] recommended admission and initiation of IV Solu-Medrol 20 mg IV every 8 hours.  Initially feeling substantially better however with initiation of low fiber diet having increasing spasm-like abdominal pain and bloody bowel movements.  Continue current treatment.  Will also add as needed lorazepam and scheduled Lexapro.        Acute ulcerative colitis flare  CT scan findings as above.  Clinically feeling better after IV steroids but still having significant spasming after eating a low fiber diet.  Continue IV Solu-Medrol 20 mg IV every 8 hours.  Continue MiraLAX 17 g daily  Low fiber diet.  Enteric and C. difficile testing negative.  Dilaudid p.o./IV and Zofran as needed.  Appreciate [facility] consultation.       Chronic anemia  Admission hemoglobin 9.1.  Similar to previous baseline.  Consent obtained to transfuse if needed.    Recheck hemoglobin 8.2-->8.6.  Follow daily hemoglobin.     Leukocytosis  White count mildly elevated 12.9.  Would not recheck as she is receiving high-dose steroids.     Pseudohyperkalemia  Initial potassium elevated at 5.4.  Specimen was hemolyzed.  Recheck potassium 4.7 with a new draw.     History of TBI  Anxiety  Alcohol abuse in  "remission  Noted.  At admission not on active antidepressants at this time.  Patient has some concerns that she will have a gap in her insurance from June until September.  Agreeable to resuming Lexapro 10 mg daily.  Ordered.  Plan to continue at discharge.  Will also have lorazepam p.o./IV available as needed.  Consult care management to make sure her insurance Is covered.     Anticoagulation   Enoxaparin (Lovenox) SQ 40 mg daily per GI.     Fluids: Saline lock  Pain meds: Tylenol and Dilaudid p.o./IV as needed  Therapy: N/A   [place]:Not present  Lines: None       Current Diet  Orders Placed This Encounter      Full Liquid Diet    Supplements  None    Lines/Drains/Airways       PIV Line  Duration             Peripheral IV 04/08/25 Right Antecubital fossa <1 day                  Barriers to Discharge: Abdominal pain after eating, bloody diarrhea    Disposition: Definitely stay overnight.  If does well hopeful discharge 4/10  Medically Ready for Discharge: Anticipated Tomorrow       Clinically Significant Risk Factors                              # Overweight: Estimated body mass index is 25.69 kg/m  as calculated from the following:    Height as of this encounter: 1.6 m (5' 3\").    Weight as of this encounter: 65.8 kg (145 lb)., PRESENT ON ADMISSION            Interval History/Subjective:  Last evening had a lot of pain after low fiber diet.  10 out of 10 in severity with spasming.  Also having bloody bowel movements which is quite distressing for the patient.  Very tearful.  Had been on Lexapro in the past and stopped due to concerns of insurance.  Is agreeable to resuming Lexapro.  She is concerned with her insurance that apparently she will not have coverage from June until September though it sounds like she is getting on disability.  Feels like the low-dose Ativan has been helpful for her.  Agreeable to trying pills.  Questions answered to verbalized satisfaction.      Last 24H PRN:     HYDROmorphone (DILAUDID) " injection 0.4 mg, 0.4 mg at 04/09/25 0641    HYDROmorphone (DILAUDID) tablet 2 mg, 2 mg at 04/09/25 0850    Physical Exam/Objective:  Temp:  [97.5  F (36.4  C)-98.1  F (36.7  C)] 97.5  F (36.4  C)  Pulse:  [] 84  Resp:  [17-20] 20  BP: (117-140)/(68-86) 128/80  SpO2:  [96 %-98 %] 96 %  Wt Readings from Last 4 Encounters:   04/07/25 65.8 kg (145 lb)   02/14/24 63.5 kg (140 lb)   10/16/23 64.9 kg (143 lb 1.6 oz)   10/09/23 65.5 kg (144 lb 4.8 oz)     Body mass index is 25.69 kg/m .    Constitutional: awake, alert, cooperative, mild distress, appears stated age, mildly obese, and quite tearful during my visit but appropriate.  Stable pressured speech.  ENT: Normocephalic, without obvious abnormality, atraumatic, external ears without lesions, oral pharynx with moist mucous membranes, tonsils without erythema or exudates, gums normal and good dentition.  Respiratory: No increased work of breathing, good air exchange, clear to auscultation bilaterally, no crackles or wheezing  Cardiovascular: Normal apical impulse, regular rate and rhythm, normal S1 and S2, no S3 or S4, and no murmur noted  GI: Diminished bowel sounds soft nondistended.  Does have mild to moderate diffuse tenderness.  Skin: normal skin color, texture, turgor, no redness, warmth, or swelling, and no rashes  Musculoskeletal: There is no redness, warmth, or swelling of the joints.  Motor strength is 5 out of 5 all extremities bilaterally.  Tone is normal. no lower extremity pitting edema present  Neurologic: Cranial nerves II-XII are grossly intact. Sensory:  Sensory intact  Neuropsychiatric: General: normal, calm, and normal eye contact Level of consciousness: alert / normal Affect: anxious and tearful Orientation: oriented to self, place, time and situation Memory and insight: normal, memory for past and recent events intact, thought process normal, and pressured speech      Medications:   Personally Reviewed.  Medications   Current  Facility-Administered Medications   Medication Dose Route Frequency Provider Last Rate Last Admin    Patient is already receiving anticoagulation with heparin, enoxaparin (LOVENOX), warfarin (COUMADIN)  or other anticoagulant medication   Does not apply Continuous PRN Mauricio Garcia MD         Current Facility-Administered Medications   Medication Dose Route Frequency Provider Last Rate Last Admin    enoxaparin ANTICOAGULANT (LOVENOX) injection 40 mg  40 mg Subcutaneous Q24H Dardine, Daniela, PA-C   40 mg at 04/09/25 0850    escitalopram (LEXAPRO) tablet 10 mg  10 mg Oral Daily Mauricio Garcia MD        methylPREDNISolone sodium succinate (SOLU-MEDROL) injection 20 mg  20 mg Intravenous Q8H Mauricio Garcia MD   20 mg at 04/09/25 0335    sodium chloride (PF) 0.9% PF flush 3 mL  3 mL Intracatheter Q8H [facility] Mauricio Garcia MD   3 mL at 04/08/25 1420       Data reviewed today: I personally reviewed all new medications, labs, imaging/diagnostics reports over the past 24 hours. Pertinent findings include:    Imaging:   No results found for this or any previous visit (from the past 24 hours).  No results found for this or any previous visit (from the past 4320 hours).    Labs:  CT Abdomen Pelvis w Contrast   Final Result   IMPRESSION:    1.  Diffuse colonic wall thickening, likely related to known ulcerative colitis. Thickening is decreased compared to 2/14/2024. No free air or intra-abdominal fluid collection.   2.  Nonobstructing nephrolithiasis.           Recent Results (from the past 24 hours)   Hemoglobin    Collection Time: 04/09/25  7:25 AM   Result Value Ref Range    Hemoglobin 8.6 (L) 11.7 - 15.7 g/dL   Extra Green Top (Lithium Heparin) Tube    Collection Time: 04/09/25  7:25 AM   Result Value Ref Range    Hold Specimen JIC        Pending Labs:  Unresulted Labs Ordered in the Past 30 Days of this Admission       No orders found from 3/8/2025 to 4/8/2025.              Mauricio Garcia MD  [facility]  [facility]  Phone: #[phone]

## 2025-04-09 NOTE — PROGRESS NOTES
"[facility] Progress Note     SUBJECTIVE:    The patient reports that after returning to a solid diet yesterday, she developed significant recurrence of her abdominal pain after lunch, mostly in her lower abdomen. With that, she has had increase in rectal bleeding episodes with clots. She notes this is still less frequency and volume than prior to admission, but did \"go backwards\" in terms of progress. This morning, pain has improved again.     OBJECTIVE:  /80 (BP Location: Left arm, Patient Position: Semi-Baltazar's, Cuff Size: Adult Regular)   Pulse 84   Temp 97.5  F (36.4  C) (Oral)   Resp 20   Ht 1.6 m (5' 3\")   Wt 65.8 kg (145 lb)   SpO2 96%   BMI 25.69 kg/m    Temp (24hrs), Av.5  F (36.4  C), Min:97.4  F (36.3  C), Max:97.5  F (36.4  C)    Patient Vitals for the past 72 hrs:   Weight   25 0747 65.8 kg (145 lb)       Intake/Output Summary (Last 24 hours) at 2025 0816  Last data filed at 2025 0550  Gross per 24 hour   Intake 1857 ml   Output 475 ml   Net 1382 ml     PHYSICAL EXAM  GEN: Resting comfortably in bed  HRT: appears well perfused  RESP: unlabored  ABD: Soft with minimal tenderness across lower abdomen, no guarding  SKIN: Visualized skin intact, no rash  Neuro: alert, pressured speech, answers questions appropriately    Additional Data:  I have reviewed the patient's new clinical lab results:     Recent Labs   Lab Test 25  0725 25  0705 25  0824 24  0954 10/19/23  0556 10/18/23  0715 22  2152 22  0822 19  1233 19  1233   WBC  --   --  12.8* 7.5  --  17.0*   < > 7.4   < >  --    HGB 8.6* 8.2* 9.1* 8.6* 9.4* 9.6*   < > 6.8*   < >  --    MCV  --   --  79 75*  --  77*   < > 71*   < >  --    PLT  --   --  630* 357 569* 559*   < > 452*   < >  --    INR  --   --   --   --   --   --   --  1.03  --  1.08    < > = values in this interval not displayed.     Recent Labs   Lab Test 25  1536 25  0824 24  0954 " 10/16/23  1019   POTASSIUM 4.7 5.4* 3.5 4.0   CHLORIDE  --  98 106 99   CO2  --  24 24 27   BUN  --  14.0 15.3 23.3*   ANIONGAP  --  13 11 11     Recent Labs   Lab Test 04/07/25  0925 04/07/25  0824 02/14/24  0954 10/16/23  1019 10/09/23  0017 10/08/23  2122 07/24/22  0822 02/14/19  1233   ALBUMIN  --  3.5 3.9 4.1  --  3.6   < >  --    BILITOTAL  --  0.3 0.3 <0.2  --  <0.2   < >  --    ALT  --   --  12 15  --  10   < >  --    AST  --   --  20 14  --  22   < >  --    PROTEIN  --   --   --   --  70*  --   --  100 mg/dL*   LIPASE 60  --   --  48  --  42  --   --     < > = values in this interval not displayed.     Imaging results:  EXAM: CT ABDOMEN PELVIS W CONTRAST  LOCATION: [facility]  DATE: 4/7/2025     INDICATION: Diffuse abdominal pain, recent colonoscopy  COMPARISON: CT abdomen pelvis 2/14/2024  TECHNIQUE: CT scan of the abdomen and pelvis was performed following injection of IV contrast. Multiplanar reformats were obtained. Dose reduction techniques were used.  CONTRAST: 90 mL iopamidol     FINDINGS:   LOWER CHEST: Right basilar atelectasis adjacent to healed right posterior rib fractures.     HEPATOBILIARY: Normal liver contours. Subcentimeter benign hemangioma in segment 2 (3/23) is unchanged. No worrisome liver lesions. Normal gallbladder.     PANCREAS: Normal.     SPLEEN: Normal.     ADRENAL GLANDS: Normal.     KIDNEYS/BLADDER: Kidneys enhance symmetrically with unchanged benign left renal cyst. Punctate nonobstructing nephrolithiasis bilaterally, measuring up to 4 mm in the right upper and 2 mm in the left lower poles. No ureteral stones or hydronephrosis.   Urinary bladder is unremarkable.     BOWEL: Colonic wall thickening of the transverse, descending, and rectosigmoid colon is decreased from 2/14/2024 and likely related to known ulcerative colitis. Normal appendix. No free fluid or free air.     LYMPH NODES: No lymphadenopathy.     VASCULATURE: The abdominal aorta is  nonaneurysmal.     PELVIC ORGANS: Uterus is retroflexed. No adnexal mass.     MUSCULOSKELETAL: No acute osseous abnormality.                                                                 IMPRESSION:   1.  Diffuse colonic wall thickening, likely related to known ulcerative colitis. Thickening is decreased compared to 2/14/2024. No free air or intra-abdominal fluid collection.  2.  Nonobstructing nephrolithiasis.     ASSESSMENT:      Abdominal pain  Rectal bleeding  Ulcerative colitis with flare  Nicki Mckeon is a 44 year old female with a PMH significant for TBI, ulcerative colitis, and chronic anemia, who presented to the hospital for abdominal pain and rectal bleeding.      Known history of ulcerative colitis, currently on infliximab infusions, just increased from every 8 weeks to every 6 weeks without improvement, even with the addition of budesonide.  Colonoscopy last week with severe inflammation consistent with active ulcerative colitis.  At that time she was started on prednisone, but had no improvement with this over the last 4 days, prompting her presentation to the hospital.     The patient was started on 20 mg IV methylprednisolone every 8 hours with improvement of symptoms initially, but some worsening again with introduction of solid food. Will go back to a full liquid diet to see if this helps.    Infectious stool studies negative. Plan to discharge her home on oral prednisone taper. Will need close follow-up with IBD provider at [facility] to discuss treatment options, including increasing the dose/frequency of infliximab, or switching to a new medication.     4. Anemia  History of chronic anemia, stable upon admission, with initial drop, but then improved upon recheck.     PLAN:  -Continue IV methylprednisolone 20 mg every 8 hours  -Switch to Full liquid diet  -VTE prophylaxis with lovenox 40 mg SQ daily  -Pain and nausea control per hospital medicine  -Monitor hemoglobin, transfuse as needed <7  -Plan  to transition to oral steroids on discharge  -Prednisone taper 40 mg daily, decrease by 5 mg weekly for a total of 8 weeks  -Follow up with [facility] IBD provider at [facility] in the next several weeks, we'll call to schedule  -Will continue to follow    (Dr. Henderson)  Daniela Muñoz PA-C  [facility]  4/8/2025 8:16 AM  [phone] (office)    28 minutes of total time was spent providing patient care, including patient evaluation, reviewing documentation/test results, , and documentation.  ________________________________________________________________________

## 2025-04-09 NOTE — CONSULTS
Care Management Follow Up    10:36 AM  Consult completed. CM consulted for insurance coverage concerns. CM introduced self and asked about her concerns. Pt was quick to say she did not have any issues. When asked about insurance coverage patsy, pt stated she figured it out and did not need any CM assistance. CM will sign off.     CAROLEE Schwartz

## 2025-04-09 NOTE — PLAN OF CARE
Problem: Pain Acute  Goal: Optimal Pain Control and Function  Outcome: Not Progressing  Intervention: Develop Pain Management Plan  Recent Flowsheet Documentation  Taken 4/9/2025 0057 by Ratna Campo RN  Pain Management Interventions:   medication (see MAR)   emotional support   rest  Taken 4/8/2025 2040 by Ratna Campo RN  Pain Management Interventions:   distraction   emotional support   rest   repositioned  Taken 4/8/2025 2027 by Ratna Campo RN  Pain Management Interventions:   medication (see MAR)   distraction   emotional support   pain management plan reviewed with patient/caregiver   rest   quiet environment facilitated  Intervention: Prevent or Manage Pain  Recent Flowsheet Documentation  Taken 4/9/2025 0124 by Ratna Campo RN  Medication Review/Management: medications reviewed  Taken 4/8/2025 1936 by Ratna Campo RN  Medication Review/Management: medications reviewed   Goal Outcome Evaluation:    PRIMARY DIAGNOSIS: Ulcerative Colitis Exacerbation     OUTPATIENT/OBSERVATION GOALS TO BE MET BEFORE DISCHARGE:  ADLs back to baseline: Yes    Activity and level of assistance: Ambulating independently.    Pain status: Pt reporting severe pain after eating dinner and with BMs. IV dilaudid being used for pain with some relief.

## 2025-04-09 NOTE — PLAN OF CARE
Problem: Adult Inpatient Plan of Care  Goal: Plan of Care Review  Description: The Plan of Care Review/Shift note should be completed every shift.  The Outcome Evaluation is a brief statement about your assessment that the patient is improving, declining, or no change.  This information will be displayed automatically on your shiftnote.  Outcome: Not Progressing   Goal Outcome Evaluation:  Hemoglobin increased to 8.6 but patient continues to report blood streaks/clots from rectum. Increased pain noted today - has utilized both PO and IV Dilaudid with decrease in pain noted. Reported increased nausea and abdominal pain after lunch - Zofran administered. Patient back to full liquid diet from low fiber per GI.     Continues to have adequate oral intake/appetite.     HLM Admission: 8- Walk 250 feet or more  HLM Daily7-Walk 25 feet or more

## 2025-04-10 ENCOUNTER — APPOINTMENT (OUTPATIENT)
Dept: RADIOLOGY | Facility: CLINIC | Age: 45
DRG: 387 | End: 2025-04-10
Attending: PHYSICIAN ASSISTANT
Payer: COMMERCIAL

## 2025-04-10 LAB
HGB BLD-MCNC: 9.3 G/DL (ref 11.7–15.7)
HOLD SPECIMEN: NORMAL

## 2025-04-10 PROCEDURE — 250N000013 HC RX MED GY IP 250 OP 250 PS 637: Performed by: FAMILY MEDICINE

## 2025-04-10 PROCEDURE — 250N000011 HC RX IP 250 OP 636: Performed by: FAMILY MEDICINE

## 2025-04-10 PROCEDURE — 120N000001 HC R&B MED SURG/OB

## 2025-04-10 PROCEDURE — 74019 RADEX ABDOMEN 2 VIEWS: CPT

## 2025-04-10 PROCEDURE — 36415 COLL VENOUS BLD VENIPUNCTURE: CPT | Performed by: FAMILY MEDICINE

## 2025-04-10 PROCEDURE — 85018 HEMOGLOBIN: CPT | Performed by: FAMILY MEDICINE

## 2025-04-10 PROCEDURE — 250N000013 HC RX MED GY IP 250 OP 250 PS 637: Performed by: PHYSICIAN ASSISTANT

## 2025-04-10 PROCEDURE — 99232 SBSQ HOSP IP/OBS MODERATE 35: CPT | Performed by: FAMILY MEDICINE

## 2025-04-10 PROCEDURE — 250N000011 HC RX IP 250 OP 636: Performed by: PHYSICIAN ASSISTANT

## 2025-04-10 RX ORDER — POLYETHYLENE GLYCOL 3350 17 G/17G
17 POWDER, FOR SOLUTION ORAL 2 TIMES DAILY
Status: DISCONTINUED | OUTPATIENT
Start: 2025-04-10 | End: 2025-04-10

## 2025-04-10 RX ORDER — POLYETHYLENE GLYCOL 3350 17 G/17G
34 POWDER, FOR SOLUTION ORAL 2 TIMES DAILY
Status: DISCONTINUED | OUTPATIENT
Start: 2025-04-10 | End: 2025-04-11 | Stop reason: HOSPADM

## 2025-04-10 RX ADMIN — ESCITALOPRAM OXALATE 10 MG: 10 TABLET ORAL at 10:11

## 2025-04-10 RX ADMIN — METHYLPREDNISOLONE SODIUM SUCCINATE 20 MG: 40 INJECTION, POWDER, FOR SOLUTION INTRAMUSCULAR; INTRAVENOUS at 10:11

## 2025-04-10 RX ADMIN — ONDANSETRON 4 MG: 4 TABLET, ORALLY DISINTEGRATING ORAL at 02:06

## 2025-04-10 RX ADMIN — POLYETHYLENE GLYCOL 3350 17 G: 17 POWDER, FOR SOLUTION ORAL at 11:48

## 2025-04-10 RX ADMIN — ONDANSETRON 4 MG: 2 INJECTION INTRAMUSCULAR; INTRAVENOUS at 19:42

## 2025-04-10 RX ADMIN — ENOXAPARIN SODIUM 40 MG: 40 INJECTION SUBCUTANEOUS at 10:11

## 2025-04-10 RX ADMIN — HYDROMORPHONE HYDROCHLORIDE 2 MG: 2 TABLET ORAL at 14:49

## 2025-04-10 RX ADMIN — HYDROMORPHONE HYDROCHLORIDE 0.4 MG: 0.2 INJECTION, SOLUTION INTRAMUSCULAR; INTRAVENOUS; SUBCUTANEOUS at 01:55

## 2025-04-10 RX ADMIN — HYDROMORPHONE HYDROCHLORIDE 2 MG: 2 TABLET ORAL at 07:39

## 2025-04-10 RX ADMIN — METHYLPREDNISOLONE SODIUM SUCCINATE 20 MG: 40 INJECTION, POWDER, FOR SOLUTION INTRAMUSCULAR; INTRAVENOUS at 19:41

## 2025-04-10 RX ADMIN — METHYLPREDNISOLONE SODIUM SUCCINATE 20 MG: 40 INJECTION, POWDER, FOR SOLUTION INTRAMUSCULAR; INTRAVENOUS at 02:05

## 2025-04-10 ASSESSMENT — ACTIVITIES OF DAILY LIVING (ADL)
ADLS_ACUITY_SCORE: 33

## 2025-04-10 NOTE — PROGRESS NOTES
[facility] MEDICINE  PROGRESS NOTE     Code Status: Full Code       Assessment and Plan:  Nicki Mckeon is a 44 year old female with PMH signficant for ulcerative colitis, TBI, anxiety, distant history of alcohol abuse in remission.  Works with [employer].  Receiving infliximab every 6 weeks most recently 3/7/2025.  On 4/3 underwent EGD showing chronic gastric inflammation negative for H. pylori and colonoscopy with severe inflammation throughout the colon with large deep ulcers.  Taking oral prednisone but abdominal pain 10 out of 10 and small-volume bloody diarrhea continued.  4/7/2025 seen in the ED.  Hemoglobin 9.1.  CT scan showed diffuse colonic thickening consistent with colitis flare.  [facility] recommended admission and initiation of IV Solu-Medrol 20 mg IV every 8 hours.  Initially feeling substantially better however with initiation of low fiber diet having increasing spasm-like abdominal pain and bloody bowel movements.  Transition back to full liquid diet.  Continues to have abdominal pain but pain medication seems to temporarily help.  Continue current treatment.  For her anxiety initiated as needed lorazepam and scheduled Lexapro.  Disposition dependent upon dietary management/GI recommendations.        Acute ulcerative colitis flare  CT scan findings as above.  Enteric and C. difficile testing negative.  Clinically feeling better after IV steroids but still having significant spasming after eating a low fiber diet.  Continue IV Solu-Medrol 20 mg IV every 8 hours.  Continue MiraLAX 17 g daily  Low fiber diet led to increased pain.  Transitioned to full liquid diet.  Dilaudid p.o./IV and Zofran as needed.  Appreciate [facility] consultation.     Chronic anemia  Admission hemoglobin 9.1.  Similar to previous baseline.  Consent obtained to transfuse if needed.    Recheck hemoglobin 8.2-->8.6-->9.3.  No further checks indicated.     Leukocytosis  White count  "mildly elevated 12.9.  Would not recheck as she is receiving high-dose steroids.     Pseudohyperkalemia  Initial potassium elevated at 5.4.  Specimen was hemolyzed.  Recheck potassium 4.7 with a new draw.     History of TBI  Anxiety  Alcohol abuse in remission  Noted.  At admission not on active antidepressants at this time.  Patient has some concerns that she will have a gap in her insurance from June until September.  Agreeable to resuming Lexapro 10 mg daily.  Plan to continue at discharge.  Lorazepam p.o./IV available as needed.  Consult care management to make to help with insurance coverage issues.     Anticoagulation   Enoxaparin (Lovenox) SQ 40 mg daily per GI.     Fluids: Saline lock  Pain meds: Tylenol and Dilaudid p.o./IV as needed  Therapy: N/A    [place]:Not present  Lines: None       Current Diet  Orders Placed This Encounter      Full Liquid Diet    Supplements  None    Lines/Drains/Airways       PIV Line  Duration             Peripheral IV 04/08/25 Right Antecubital fossa 1 day                  Barriers to Discharge: Dietary advancement, pain control    Disposition: May still need another day  Medically Ready for Discharge: Anticipated Tomorrow       Clinically Significant Risk Factors                              # Overweight: Estimated body mass index is 25.69 kg/m  as calculated from the following:    Height as of this encounter: 1.6 m (5' 3\").    Weight as of this encounter: 65.8 kg (145 lb)., PRESENT ON ADMISSION            Interval History/Subjective:  Patient quite tearful this morning.  States that she was able to get some sleep but abdominal pain continues.  It is worse after she eats.  Still having stool urgency and blood with her bowel movements.  After receiving oral pain medication rates her pain level as an 8 out of 10 but states that that is manageable.  Frustrated with lack of progress.  Questions answered to verbalized satisfaction.      Last 24H PRN:     HYDROmorphone (DILAUDID) " injection 0.4 mg, 0.4 mg at 04/10/25 0155    HYDROmorphone (DILAUDID) tablet 2 mg, 2 mg at 04/10/25 0739    ondansetron (ZOFRAN ODT) ODT tab 4 mg, 4 mg at 04/10/25 0206 **OR** ondansetron (ZOFRAN) injection 4 mg, 4 mg at 04/09/25 1313    Physical Exam/Objective:  Temp:  [97.7  F (36.5  C)-98  F (36.7  C)] 97.7  F (36.5  C)  Pulse:  [68-91] 82  Resp:  [14-20] 18  BP: (121-129)/(62-76) 129/76  SpO2:  [96 %-98 %] 98 %  Wt Readings from Last 4 Encounters:   04/07/25 65.8 kg (145 lb)   02/14/24 63.5 kg (140 lb)   10/16/23 64.9 kg (143 lb 1.6 oz)   10/09/23 65.5 kg (144 lb 4.8 oz)     Body mass index is 25.69 kg/m .    Constitutional: awake, alert, cooperative, no apparent distress, and appears stated age and tearful and uncomfortable.  Stable pressured speech.  ENT: Normocephalic, without obvious abnormality, atraumatic, external ears without lesions, oral pharynx with moist mucous membranes, tonsils without erythema or exudates, gums normal and good dentition.  Respiratory: No increased work of breathing, good air exchange, clear to auscultation bilaterally, no crackles or wheezing  Cardiovascular: Normal apical impulse, regular rate and rhythm, normal S1 and S2, no S3 or S4, and no murmur noted  GI: Positive bowel sounds soft nondistended.  Mild tenderness compared to previous exam.  Improved.  Skin: normal skin color, texture, turgor, no redness, warmth, or swelling, and no rashes  Musculoskeletal: There is no redness, warmth, or swelling of the joints.  Motor strength is 5 out of 5 all extremities bilaterally.  Tone is normal. no lower extremity pitting edema present  Neurologic: Cranial nerves II-XII are grossly intact. Sensory:  Sensory intact  Neuropsychiatric: General: normal, calm, and poor eye contact Level of consciousness: alert / normal Affect: anxious and tearful Orientation: oriented to self, place, time and situation Memory and insight: normal, memory for past and recent events intact, thought process  normal, and stable pressured speech      Medications:   Personally Reviewed.  Medications   Current Facility-Administered Medications   Medication Dose Route Frequency Provider Last Rate Last Admin    Patient is already receiving anticoagulation with heparin, enoxaparin (LOVENOX), warfarin (COUMADIN)  or other anticoagulant medication   Does not apply Continuous PRN Mauricio Garcia MD         Current Facility-Administered Medications   Medication Dose Route Frequency Provider Last Rate Last Admin    enoxaparin ANTICOAGULANT (LOVENOX) injection 40 mg  40 mg Subcutaneous Q24H Dardine, Daniela, PA-C   40 mg at 04/09/25 0850    escitalopram (LEXAPRO) tablet 10 mg  10 mg Oral Daily Mauricio Garcia MD   10 mg at 04/09/25 1007    methylPREDNISolone sodium succinate (SOLU-MEDROL) injection 20 mg  20 mg Intravenous Q8H Mauricio Garcia MD   20 mg at 04/10/25 0205    polyethylene glycol (MIRALAX) Packet 17 g  17 g Oral Daily Dardine, Daniela, PA-C   17 g at 04/09/25 1036    sodium chloride (PF) 0.9% PF flush 3 mL  3 mL Intracatheter Q8H [facility] Mauricio Garcia MD   3 mL at 04/10/25 0634       Data reviewed today: I personally reviewed all new medications, labs, imaging/diagnostics reports over the past 24 hours. Pertinent findings include:    Imaging:   No results found for this or any previous visit (from the past 24 hours).  No results found for this or any previous visit (from the past 4320 hours).    Labs:  CT Abdomen Pelvis w Contrast   Final Result   IMPRESSION:    1.  Diffuse colonic wall thickening, likely related to known ulcerative colitis. Thickening is decreased compared to 2/14/2024. No free air or intra-abdominal fluid collection.   2.  Nonobstructing nephrolithiasis.           Recent Results (from the past 24 hours)   Hemoglobin    Collection Time: 04/10/25  7:01 AM   Result Value Ref Range    Hemoglobin 9.3 (L) 11.7 - 15.7 g/dL       Pending Labs:  Unresulted Labs Ordered in the Past 30 Days of this Admission        No orders found from 3/8/2025 to 4/8/2025.              Mauricio Garcia MD  [facility]  Phone: #[phone]

## 2025-04-10 NOTE — PLAN OF CARE
Problem: Adult Inpatient Plan of Care  Goal: Absence of Hospital-Acquired Illness or Injury  Intervention: Identify and Manage Fall Risk  Recent Flowsheet Documentation  Taken 4/9/2025 1630 by Yohana Harper RN  Safety Promotion/Fall Prevention:   safety round/check completed   room near nurse's station   room organization consistent   clutter free environment maintained   increased rounding and observation   increase visualization of patient   lighting adjusted   nonskid shoes/slippers when out of bed   patient and family education   Goal Outcome Evaluation:      Plan of Care Reviewed With: patient    Overall Patient Progress: improvingOverall Patient Progress: improving  Pt alert and oriented,  vss on room air, can voice needs, pain meds given, pt is independent, abdo pain mild. On full liquid diet, potential discharge tomorrow.

## 2025-04-10 NOTE — PROGRESS NOTES
"[facility] Progress Note     SUBJECTIVE:    The patient reports continued abdominal pain that has worsened again. She reports it has gone from her lower abdomen to being \"all over\". It is as severe as it was prior to admission. She also has significant tenesmus, and has been up to the bathroom all night, with only passage of small amounts of blood. The volume of bleeding has decreased. She passed a small amount of stool yesterday, noting it was extremely painful.     OBJECTIVE:  /76 (BP Location: Left arm, Patient Position: Right side)   Pulse 82   Temp 97.7  F (36.5  C) (Oral)   Resp 18   Ht 1.6 m (5' 3\")   Wt 65.8 kg (145 lb)   SpO2 98%   BMI 25.69 kg/m    Temp (24hrs), Av.5  F (36.4  C), Min:97.4  F (36.3  C), Max:97.5  F (36.4  C)    No data found.      Intake/Output Summary (Last 24 hours) at 2025 0816  Last data filed at 2025 0550  Gross per 24 hour   Intake 1857 ml   Output 475 ml   Net 1382 ml     PHYSICAL EXAM  GEN: Sitting upright in bed  HRT: appears well perfused  RESP: unlabored  ABD: Soft with minimal tenderness across entire abdomen, no guarding  SKIN: Visualized skin intact, no rash  Neuro: alert, pressured speech, answers questions appropriately  Psych: Tearful    Additional Data:  I have reviewed the patient's new clinical lab results:     Recent Labs   Lab Test 04/10/25  0701 25  0725 25  0705 25  0824 24  0954 10/19/23  0556 10/18/23  0715 22  2152 22  0822 19  1233 19  1233   WBC  --   --   --  12.8* 7.5  --  17.0*   < > 7.4   < >  --    HGB 9.3* 8.6* 8.2* 9.1* 8.6* 9.4* 9.6*   < > 6.8*   < >  --    MCV  --   --   --  79 75*  --  77*   < > 71*   < >  --    PLT  --   --   --  630* 357 569* 559*   < > 452*   < >  --    INR  --   --   --   --   --   --   --   --  1.03  --  1.08    < > = values in this interval not displayed.     Recent Labs   Lab Test 25  1536 25  0824 24  0954 10/16/23  1019 "   POTASSIUM 4.7 5.4* 3.5 4.0   CHLORIDE  --  98 106 99   CO2  --  24 24 27   BUN  --  14.0 15.3 23.3*   ANIONGAP  --  13 11 11     Recent Labs   Lab Test 04/07/25  0925 04/07/25  0824 02/14/24  0954 10/16/23  1019 10/09/23  0017 10/08/23  2122 07/24/22  0822 02/14/19  1233   ALBUMIN  --  3.5 3.9 4.1  --  3.6   < >  --    BILITOTAL  --  0.3 0.3 <0.2  --  <0.2   < >  --    ALT  --   --  12 15  --  10   < >  --    AST  --   --  20 14  --  22   < >  --    PROTEIN  --   --   --   --  70*  --   --  100 mg/dL*   LIPASE 60  --   --  48  --  42  --   --     < > = values in this interval not displayed.     Imaging results:  EXAM: CT ABDOMEN PELVIS W CONTRAST  LOCATION: [facility]  DATE: 4/7/2025     INDICATION: Diffuse abdominal pain, recent colonoscopy  COMPARISON: CT abdomen pelvis 2/14/2024  TECHNIQUE: CT scan of the abdomen and pelvis was performed following injection of IV contrast. Multiplanar reformats were obtained. Dose reduction techniques were used.  CONTRAST: 90 mL iopamidol     FINDINGS:   LOWER CHEST: Right basilar atelectasis adjacent to healed right posterior rib fractures.     HEPATOBILIARY: Normal liver contours. Subcentimeter benign hemangioma in segment 2 (3/23) is unchanged. No worrisome liver lesions. Normal gallbladder.     PANCREAS: Normal.     SPLEEN: Normal.     ADRENAL GLANDS: Normal.     KIDNEYS/BLADDER: Kidneys enhance symmetrically with unchanged benign left renal cyst. Punctate nonobstructing nephrolithiasis bilaterally, measuring up to 4 mm in the right upper and 2 mm in the left lower poles. No ureteral stones or hydronephrosis.   Urinary bladder is unremarkable.     BOWEL: Colonic wall thickening of the transverse, descending, and rectosigmoid colon is decreased from 2/14/2024 and likely related to known ulcerative colitis. Normal appendix. No free fluid or free air.     LYMPH NODES: No lymphadenopathy.     VASCULATURE: The abdominal aorta is nonaneurysmal.      PELVIC ORGANS: Uterus is retroflexed. No adnexal mass.     MUSCULOSKELETAL: No acute osseous abnormality.                                                                 IMPRESSION:   1.  Diffuse colonic wall thickening, likely related to known ulcerative colitis. Thickening is decreased compared to 2/14/2024. No free air or intra-abdominal fluid collection.  2.  Nonobstructing nephrolithiasis.     XR abdomen flat & upright 4/10/2025  Pending    ASSESSMENT:      Abdominal pain  Rectal bleeding  Ulcerative colitis with flare  Nicki Mckeon is a 44 year old female with a PMH significant for TBI, ulcerative colitis, and chronic anemia, who presented to the hospital for abdominal pain and rectal bleeding.      Known history of ulcerative colitis, currently on infliximab infusions, just increased from every 8 weeks to every 6 weeks without improvement, even with the addition of budesonide.  Colonoscopy last week with severe inflammation consistent with active ulcerative colitis.  At that time she was started on prednisone, but had no improvement with this over the last 4 days, prompting her presentation to the hospital.     The patient was started on 20 mg IV methylprednisolone every 8 hours with improvement of symptoms initially, but some worsening again with introduction of solid food, so clear liquid diet was resumed 4/9. 4/10 with worsening pain, tenesmus, ongoing rectal bleeding, although less volume. XR abdomen flat & upright ordered to rule out toxic megacolon.     Infectious stool studies negative. Plan to discharge her home on oral prednisone taper. Will need close follow-up with IBD provider at [facility] to discuss treatment options, including increasing the dose/frequency of infliximab, or switching to a new medication.     4. Anemia  History of chronic anemia, stable upon admission, with initial drop, but then improved upon recheck.     PLAN:  -XR abdomen flat & upright today  -If XR is reassuring, will  increase miralax dosing to help with constipation  -May need to consider high dose infliximab during admission if symptoms do not improve  -Continue IV methylprednisolone 20 mg every 8 hours  -Continue Full liquid diet  -VTE prophylaxis with lovenox 40 mg SQ daily  -Pain and nausea control per hospital medicine  -Monitor hemoglobin, transfuse as needed <7  -Plan to transition to oral steroids on discharge  -Prednisone taper 40 mg daily, decrease by 5 mg weekly for a total of 8 weeks  -Follow up with [facility] IBD provider at [facility] in the next several weeks, we'll call to schedule  -Will continue to follow    (Dr. Henderson)  Daniela Muñoz PA-C  [facility]  4/8/2025 8:16 AM  [phone] (office)    28 minutes of total time was spent providing patient care, including patient evaluation, reviewing documentation/test results, , and documentation.  ________________________________________________________________________      GI ATTENDING BRIEF ADDENDUM:  The patient was seen and examined and discussed with Daniela Muñoz PA-C.    Agree with Daniela Muñoz PA-C's history, physical examination, assessment and plan above with exception of changes noted below.    Patient reports she continues to have abdominal pain and some rectal bleeding but no bowel movements.  She reports she has constipation at baseline.  She has been receiving several doses of Dilaudid daily for abdominal pain.  Abdominal x-ray this morning did not show any evidence of toxic megacolon.  Patient has a soft abdomen with mild diffuse abdominal tenderness on examination.  Suspect that constipation may be playing some role in her abdominal pain as the Dilaudid is worsening her baseline constipation.    - Minimize narcotic use  - MiraLAX 1-2 doses twice daily.  Adjust MiraLAX dose and/or frequency as needed.  - Continue Solu-Medrol 20 mg IV every 8 hours.  - Saturday morning will be 5 days of IV steroids.  If patient is not significantly improved  by then, then would consider giving her a dose of infliximab 10 mg/kg IV in the hospital on Saturday to see if this will improve her abdominal pain and rectal bleeding.  - Further recommendations as noted above.    20 total minutes spent.    Ilda Henderson MD  [facility]

## 2025-04-10 NOTE — PLAN OF CARE
Goal Outcome Evaluation:      Plan of Care Reviewed With: patient    Overall Patient Progress: improvingOverall Patient Progress: improving    Outcome Evaluation: Pt alert and oriented x 4. Difficulty understanding at times due to fast speech. Pt's pain controlled on PO dilaudid this morning. Rated it a 5/10 until a little before 3pm. Miralax given for constipation at 1148. Pain increases after having a bowel movement. Pt reporting that she is not having real bowel movements, just blood. GI updated a little before 3pm, pt requested an increase in diet so she can have eggs. GI approved, diet changed. Discharge pending pain control.      Problem: Pain Acute  Goal: Optimal Pain Control and Function  Outcome: Progressing  Intervention: Develop Pain Management Plan  Recent Flowsheet Documentation  Taken 4/10/2025 1449 by Farooq Ruff RN  Pain Management Interventions: pain management plan reviewed with patient/caregiver  Taken 4/10/2025 1015 by Farooq Ruff, RN  Pain Management Interventions: pain management plan reviewed with patient/caregiver  Taken 4/10/2025 0821 by Farooq Ruff RN  Pain Management Interventions: medication offered but refused  Taken 4/10/2025 0739 by Farooq Ruff, RN  Pain Management Interventions:   medication (see MAR)   distraction   emotional support   rest   pain management plan reviewed with patient/caregiver  Intervention: Prevent or Manage Pain  Recent Flowsheet Documentation  Taken 4/10/2025 0750 by Farooq Ruff, RN  Medication Review/Management: medications reviewed     Farooq Ruff RN, ONC

## 2025-04-10 NOTE — PLAN OF CARE
Problem: Adult Inpatient Plan of Care  Goal: Optimal Comfort and Wellbeing  Intervention: Monitor Pain and Promote Comfort  Recent Flowsheet Documentation  Taken 4/10/2025 0155 by Ratna Campo, RN  Pain Management Interventions:   medication (see MAR)   distraction   emotional support   rest   Goal Outcome Evaluation:    Pt reporting severe pain with bowel movements. Pain managed with IV dilaudid. Up independently in room. On full liquid diet.

## 2025-04-11 VITALS
HEART RATE: 92 BPM | BODY MASS INDEX: 25.69 KG/M2 | RESPIRATION RATE: 18 BRPM | SYSTOLIC BLOOD PRESSURE: 164 MMHG | TEMPERATURE: 98 F | DIASTOLIC BLOOD PRESSURE: 93 MMHG | HEIGHT: 63 IN | WEIGHT: 145 LBS | OXYGEN SATURATION: 96 %

## 2025-04-11 VITALS
SYSTOLIC BLOOD PRESSURE: 118 MMHG | HEART RATE: 86 BPM | WEIGHT: 145 LBS | OXYGEN SATURATION: 96 % | DIASTOLIC BLOOD PRESSURE: 80 MMHG | BODY MASS INDEX: 25.69 KG/M2 | RESPIRATION RATE: 18 BRPM | TEMPERATURE: 98 F | HEIGHT: 63 IN

## 2025-04-11 PROCEDURE — 250N000011 HC RX IP 250 OP 636: Performed by: PHYSICIAN ASSISTANT

## 2025-04-11 PROCEDURE — 250N000011 HC RX IP 250 OP 636: Mod: JW | Performed by: FAMILY MEDICINE

## 2025-04-11 PROCEDURE — 250N000013 HC RX MED GY IP 250 OP 250 PS 637: Performed by: FAMILY MEDICINE

## 2025-04-11 PROCEDURE — 99239 HOSP IP/OBS DSCHRG MGMT >30: CPT | Performed by: FAMILY MEDICINE

## 2025-04-11 RX ORDER — ONDANSETRON 4 MG/1
4 TABLET, ORALLY DISINTEGRATING ORAL EVERY 6 HOURS PRN
Qty: 20 TABLET | Refills: 0 | Status: SHIPPED | OUTPATIENT
Start: 2025-04-11

## 2025-04-11 RX ORDER — LORAZEPAM 0.5 MG/1
0.5 TABLET ORAL EVERY 8 HOURS PRN
Qty: 10 TABLET | Refills: 0 | Status: SHIPPED | OUTPATIENT
Start: 2025-04-11

## 2025-04-11 RX ORDER — PREDNISONE 5 MG/1
TABLET ORAL
Qty: 252 TABLET | Refills: 0 | Status: SHIPPED | OUTPATIENT
Start: 2025-04-11 | End: 2025-06-06

## 2025-04-11 RX ORDER — ONDANSETRON 4 MG/1
4 TABLET, ORALLY DISINTEGRATING ORAL EVERY 6 HOURS PRN
Status: DISCONTINUED | OUTPATIENT
Start: 2025-04-11 | End: 2025-04-11 | Stop reason: HOSPADM

## 2025-04-11 RX ORDER — ACETAMINOPHEN 325 MG/1
650 TABLET ORAL EVERY 4 HOURS PRN
COMMUNITY
Start: 2025-04-11

## 2025-04-11 RX ORDER — POLYETHYLENE GLYCOL 3350 17 G/17G
17 POWDER, FOR SOLUTION ORAL DAILY
Qty: 510 G | Refills: 0 | Status: SHIPPED | OUTPATIENT
Start: 2025-04-11

## 2025-04-11 RX ORDER — ESCITALOPRAM OXALATE 10 MG/1
10 TABLET ORAL DAILY
Qty: 30 TABLET | Refills: 0 | Status: SHIPPED | OUTPATIENT
Start: 2025-04-12

## 2025-04-11 RX ADMIN — ENOXAPARIN SODIUM 40 MG: 40 INJECTION SUBCUTANEOUS at 08:33

## 2025-04-11 RX ADMIN — ONDANSETRON 4 MG: 2 INJECTION INTRAMUSCULAR; INTRAVENOUS at 02:04

## 2025-04-11 RX ADMIN — METHYLPREDNISOLONE SODIUM SUCCINATE 20 MG: 40 INJECTION, POWDER, FOR SOLUTION INTRAMUSCULAR; INTRAVENOUS at 12:08

## 2025-04-11 RX ADMIN — ESCITALOPRAM OXALATE 10 MG: 10 TABLET ORAL at 08:33

## 2025-04-11 RX ADMIN — METHYLPREDNISOLONE SODIUM SUCCINATE 20 MG: 40 INJECTION, POWDER, FOR SOLUTION INTRAMUSCULAR; INTRAVENOUS at 02:04

## 2025-04-11 RX ADMIN — ONDANSETRON 4 MG: 2 INJECTION INTRAMUSCULAR; INTRAVENOUS at 08:30

## 2025-04-11 RX ADMIN — LORAZEPAM 0.5 MG: 0.5 TABLET ORAL at 08:33

## 2025-04-11 ASSESSMENT — ACTIVITIES OF DAILY LIVING (ADL)
ADLS_ACUITY_SCORE: 33

## 2025-04-11 NOTE — PROGRESS NOTES
[facility] MEDICINE  PROGRESS NOTE     Code Status: Full Code       Assessment and Plan:  Nicki Mckeon is a 44 year old female with PMH signficant for ulcerative colitis, TBI, anxiety, distant history of alcohol abuse in remission.  Works with [employer].  Receiving infliximab every 6 weeks most recently 3/7/2025.  On 4/3 underwent EGD showing chronic gastric inflammation negative for H. pylori and colonoscopy with severe inflammation throughout the colon with large deep ulcers.  Taking oral prednisone but abdominal pain 10 out of 10 and small-volume bloody diarrhea continued.  4/7/2025 seen in the ED.  Hemoglobin 9.1.  CT scan showed diffuse colonic thickening consistent with colitis flare.  [facility] recommended admission and initiation of IV Solu-Medrol 20 mg IV every 8 hours.  Initially feeling substantially better however with initiation of low fiber diet having increasing spasm-like abdominal pain and bloody bowel movements.  Slow gradual improvement.  Evening of 4/10 denies any abdominal pain but had frequent small-volume diarrhea and had not been eating.  4/11 has not yet tried breakfast..  For her anxiety initiated as needed lorazepam and scheduled Lexapro.  Disposition dependent upon dietary management/GI recommendations.        Acute ulcerative colitis flare  -CT scan findings as above.  -Enteric and C. difficile testing negative.  -Clinically feeling better after IV steroids but still having significant spasming after eating a low fiber diet.  -Continue IV Solu-Medrol 20 mg IV every 8 hours.  -Continue MiraLAX 17 g BID.  -Utilizing Dilaudid and Zofran as needed.  -4/10 abdominal x-ray negative for SBO or free air.  -Appreciate [facility] consultation.  -4/11 patient denies any abdominal pain but has not had anything to eat.  Last evening did have frequent small-volume diarrheal stools with accompanying nausea.  Is agreeable to try to eat something this  "morning.  -Holds placed on PRN IV medications so as to encourage utilization of oral medications for her symptom control.     Chronic anemia  Admission hemoglobin 9.1.  Similar to previous baseline.  Consent obtained to transfuse if needed.    Recheck hemoglobin 8.2-->8.6-->9.3.  No further checks indicated.     Leukocytosis  White count mildly elevated 12.9.  Would not recheck as she is receiving high-dose steroids.     Pseudohyperkalemia  Initial potassium elevated at 5.4.  Specimen was hemolyzed.  Recheck potassium 4.7 with a new draw.     History of TBI  Anxiety  Alcohol abuse in remission  -Noted.  At admission not on active antidepressants at this time.  Had been on Lexapro in the past.  -Agreeable to initiate Lexapro 10 mg daily.  Plan to continue at discharge.  -Lorazepam available as needed.       Anticoagulation   Enoxaparin (Lovenox) SQ 40 mg daily per GI.     Fluids: Saline lock  Pain meds: Tylenol and Dilaudid as needed  Therapy: N/A     [place]:Not present  Lines: None       Current Diet  Orders Placed This Encounter      Low Fiber Diet    Supplements  None    Lines/Drains/Airways       PIV Line  Duration             Peripheral IV 04/08/25 Right Antecubital fossa 2 days                  Barriers to Discharge: Tolerating oral intake, symptom management with just oral medications    Disposition: Potential discharge later today versus 4/12  Medically Ready for Discharge: Anticipated Today       Clinically Significant Risk Factors                              # Overweight: Estimated body mass index is 25.69 kg/m  as calculated from the following:    Height as of this encounter: 1.6 m (5' 3\").    Weight as of this encounter: 65.8 kg (145 lb)., PRESENT ON ADMISSION            Interval History/Subjective:  Patient had significant nausea and frequent small-volume diarrheal stools last evening but no abdominal pain.  Did not eat though last evening either.  Is agreeable to trying some eggs this morning.  Was " having some increased anxiety and nausea this morning but otherwise doing okay.  Hopes she can go home later today.  Denies any chest pain or shortness of breath.  Questions answered to verbalized satisfaction.      Last 24H PRN:     HYDROmorphone (DILAUDID) tablet 2 mg, 2 mg at 04/10/25 1449    LORazepam (ATIVAN) tablet 0.5 mg, 0.5 mg at 04/11/25 0833    ondansetron (ZOFRAN ODT) ODT tab 4 mg, 4 mg at 04/10/25 0206 **OR** ondansetron (ZOFRAN) injection 4 mg, 4 mg at 04/11/25 0830    Physical Exam/Objective:  Temp:  [98  F (36.7  C)] 98  F (36.7  C)  Pulse:  [86-92] 92  Resp:  [18] 18  BP: (118-164)/(80-93) 164/93  SpO2:  [96 %] 96 %  Wt Readings from Last 4 Encounters:   04/07/25 65.8 kg (145 lb)   02/14/24 63.5 kg (140 lb)   10/16/23 64.9 kg (143 lb 1.6 oz)   10/09/23 65.5 kg (144 lb 4.8 oz)     Body mass index is 25.69 kg/m .    Constitutional: awake, alert, cooperative, no apparent distress, and appears stated age and stable pressured speech  ENT: Normocephalic, without obvious abnormality, atraumatic, external ears without lesions, oral pharynx with moist mucous membranes, tonsils without erythema or exudates, gums normal and good dentition.  Respiratory: No increased work of breathing, good air exchange, clear to auscultation bilaterally, no crackles or wheezing  Cardiovascular: Normal apical impulse, regular rate and rhythm, normal S1 and S2, no S3 or S4, and no murmur noted  GI: No scars, normal bowel sounds, soft, non-distended, non-tender, no masses palpated, no hepatosplenomegally  Skin: normal skin color, texture, turgor, no redness, warmth, or swelling, and no rashes  Musculoskeletal: There is no redness, warmth, or swelling of the joints.  Motor strength is 5 out of 5 all extremities bilaterally.  Tone is normal. no lower extremity pitting edema present  Neurologic: Cranial nerves II-XII are grossly intact. Sensory:  Sensory intact  Neuropsychiatric: General: fidgeting and normal eye contact Level of  consciousness: alert / normal Affect: normal and full Orientation: oriented to self, place, time and situation Memory and insight: normal, memory for past and recent events intact, and thought process normal.  Stable pressured speech secondary to her TBI.      Medications:   Personally Reviewed.  Medications   Current Facility-Administered Medications   Medication Dose Route Frequency Provider Last Rate Last Admin    Patient is already receiving anticoagulation with heparin, enoxaparin (LOVENOX), warfarin (COUMADIN)  or other anticoagulant medication   Does not apply Continuous PRN Mauricio Garcia MD         Current Facility-Administered Medications   Medication Dose Route Frequency Provider Last Rate Last Admin    enoxaparin ANTICOAGULANT (LOVENOX) injection 40 mg  40 mg Subcutaneous Q24H Dardine, Daniela, PA-C   40 mg at 04/11/25 0833    escitalopram (LEXAPRO) tablet 10 mg  10 mg Oral Daily Mauricio Garcia MD   10 mg at 04/11/25 0833    methylPREDNISolone sodium succinate (SOLU-MEDROL) injection 20 mg  20 mg Intravenous Q8H Mauricio Garcia MD   20 mg at 04/11/25 0204    polyethylene glycol (MIRALAX) Packet 34 g  34 g Oral BID Dardine, Daniela, PA-C        sodium chloride (PF) 0.9% PF flush 3 mL  3 mL Intracatheter Q8H [facility] Mauricio Garcia MD   3 mL at 04/11/25 0832       Data reviewed today: I personally reviewed all new medications, labs, imaging/diagnostics reports over the past 24 hours. Pertinent findings include:    Imaging:   No results found for this or any previous visit (from the past 24 hours).  No results found for this or any previous visit (from the past 4320 hours).    Labs:  XR Abdomen 2 Views   Final Result   IMPRESSION: No bowel dilatation or evidence of obstruction. No obvious free air.         CT Abdomen Pelvis w Contrast   Final Result   IMPRESSION:    1.  Diffuse colonic wall thickening, likely related to known ulcerative colitis. Thickening is decreased compared to 2/14/2024. No free air or  intra-abdominal fluid collection.   2.  Nonobstructing nephrolithiasis.           No results found for this or any previous visit (from the past 24 hours).    Pending Labs:  Unresulted Labs Ordered in the Past 30 Days of this Admission       No orders found from 3/8/2025 to 4/8/2025.              Mauricio Garcia MD  [facility]  Phone: #[phone]

## 2025-04-11 NOTE — PROGRESS NOTES
"[facility] Dig[facility] Progress Note     SUBJECTIVE:    The patient had several loose stools overnight, but no further rectal bleeding. Abdominal pain has resolved. She doesn't want to take anymore laxatives with the diarrhea she is having. She is having increased nausea overnight, still on a liquid diet. Was able to eat some eggs this morning without difficulty. She is eager to discharge home today.     OBJECTIVE:  BP (!) 164/93 (BP Location: Right arm)   Pulse 92   Temp 98  F (36.7  C) (Oral)   Resp 18   Ht 1.6 m (5' 3\")   Wt 65.8 kg (145 lb)   SpO2 96%   BMI 25.69 kg/m    Temp (24hrs), Av.5  F (36.4  C), Min:97.4  F (36.3  C), Max:97.5  F (36.4  C)    No data found.      Intake/Output Summary (Last 24 hours) at 2025 0816  Last data filed at 2025 0550  Gross per 24 hour   Intake 1857 ml   Output 475 ml   Net 1382 ml     PHYSICAL EXAM  GEN: Sitting upright in bed  HRT: appears well perfused  RESP: unlabored  ABD: Soft, no tenderness, no guarding  SKIN: Visualized skin intact, no rash  Neuro: alert, pressured speech, answers questions appropriately  Psych: Pleasant    Additional Data:  I have reviewed the patient's new clinical lab results:     Recent Labs   Lab Test 04/10/25  0701 25  0725 25  0705 25  0824 24  0954 10/19/23  0556 10/18/23  0715 22  2152 22  0822 19  1233 19  1233   WBC  --   --   --  12.8* 7.5  --  17.0*   < > 7.4   < >  --    HGB 9.3* 8.6* 8.2* 9.1* 8.6* 9.4* 9.6*   < > 6.8*   < >  --    MCV  --   --   --  79 75*  --  77*   < > 71*   < >  --    PLT  --   --   --  630* 357 569* 559*   < > 452*   < >  --    INR  --   --   --   --   --   --   --   --  1.03  --  1.08    < > = values in this interval not displayed.     Recent Labs   Lab Test 25  1536 25  0824 24  0954 10/16/23  1019   POTASSIUM 4.7 5.4* 3.5 4.0   CHLORIDE  --  98 106 99   CO2  --  24 24 27   BUN  --  14.0 15.3 23.3*   ANIONGAP  --  13 11 11     Recent " Labs   Lab Test 04/07/25  0925 04/07/25  0824 02/14/24  0954 10/16/23  1019 10/09/23  0017 10/08/23  2122 07/24/22  0822 02/14/19  1233   ALBUMIN  --  3.5 3.9 4.1  --  3.6   < >  --    BILITOTAL  --  0.3 0.3 <0.2  --  <0.2   < >  --    ALT  --   --  12 15  --  10   < >  --    AST  --   --  20 14  --  22   < >  --    PROTEIN  --   --   --   --  70*  --   --  100 mg/dL*   LIPASE 60  --   --  48  --  42  --   --     < > = values in this interval not displayed.     Imaging results:  EXAM: CT ABDOMEN PELVIS W CONTRAST  LOCATION: [facility]  DATE: 4/7/2025     INDICATION: Diffuse abdominal pain, recent colonoscopy  COMPARISON: CT abdomen pelvis 2/14/2024  TECHNIQUE: CT scan of the abdomen and pelvis was performed following injection of IV contrast. Multiplanar reformats were obtained. Dose reduction techniques were used.  CONTRAST: 90 mL iopamidol     FINDINGS:   LOWER CHEST: Right basilar atelectasis adjacent to healed right posterior rib fractures.     HEPATOBILIARY: Normal liver contours. Subcentimeter benign hemangioma in segment 2 (3/23) is unchanged. No worrisome liver lesions. Normal gallbladder.     PANCREAS: Normal.     SPLEEN: Normal.     ADRENAL GLANDS: Normal.     KIDNEYS/BLADDER: Kidneys enhance symmetrically with unchanged benign left renal cyst. Punctate nonobstructing nephrolithiasis bilaterally, measuring up to 4 mm in the right upper and 2 mm in the left lower poles. No ureteral stones or hydronephrosis.   Urinary bladder is unremarkable.     BOWEL: Colonic wall thickening of the transverse, descending, and rectosigmoid colon is decreased from 2/14/2024 and likely related to known ulcerative colitis. Normal appendix. No free fluid or free air.     LYMPH NODES: No lymphadenopathy.     VASCULATURE: The abdominal aorta is nonaneurysmal.     PELVIC ORGANS: Uterus is retroflexed. No adnexal mass.     MUSCULOSKELETAL: No acute osseous abnormality.                                                                  IMPRESSION:   1.  Diffuse colonic wall thickening, likely related to known ulcerative colitis. Thickening is decreased compared to 2/14/2024. No free air or intra-abdominal fluid collection.  2.  Nonobstructing nephrolithiasis.     XR abdomen flat & upright 4/10/2025  Pending    ASSESSMENT:      Abdominal pain  Rectal bleeding  Ulcerative colitis with flare  Nicki Mckeon is a 44 year old female with a PMH significant for TBI, ulcerative colitis, and chronic anemia, who presented to the hospital for abdominal pain and rectal bleeding.      Known history of ulcerative colitis, currently on infliximab infusions, just increased from every 8 weeks to every 6 weeks without improvement, even with the addition of budesonide.  Colonoscopy last week with severe inflammation consistent with active ulcerative colitis.  At that time she was started on prednisone, but had no improvement with this after four days, prompting her presentation to the hospital.     Infectious stool studies negative. The patient was started on 20 mg IV methylprednisolone every 8 hours with improvement of symptoms initially, but some worsening again with introduction of solid food, so clear liquid diet was resumed 4/9. 4/10 with worsening pain, tenesmus, ongoing rectal bleeding, although less volume. XR abdomen flat & upright 4/10 without evidence of toxic megacolon. Continued Miralax given concern constipation was contributing to her pain. Several loose stools overnight 4/10, with resolution of Abdominal pain/rectal bleeding 4/11. Increased nausea 4/11, well-controlled with Zofran. Tolerated solids for breakfast. Feels comfortable discharge home with oral prednisone and Zofran. Will need very close follow up with IBD provider at [facility] to discuss long-term treatment plan.     4. Anemia  History of chronic anemia, stable upon admission, with initial drop, but then improved upon recheck.     PLAN:  -Miralax 1 capful daily on  discharge, ok to hold for loose stools  -Low fiber diet for 2-4 weeks based on symptom improvement  -Zofran prn for nausea control  -VTE prophylaxis with lovenox 40 mg SQ daily until discharge  -Transition to oral steroids on discharge  -Prednisone taper 40 mg daily, decrease by 5 mg weekly for a total of 8 weeks  -Follow up with [facility] IBD provider at [facility] in the next several weeks, we'll call to schedule  -Ok to discharge from GI standpoint if tolerating solids  -GI will sign off    (Dr. Henderson)  Daniela Muñoz PA-C  [facility]  4/8/2025 8:16 AM  [phone] (office)    35 minutes of total time was spent providing patient care, including patient evaluation, reviewing documentation/test results, , and documentation.

## 2025-04-11 NOTE — PLAN OF CARE
Problem: Adult Inpatient Plan of Care  Goal: Optimal Comfort and Wellbeing  Outcome: Progressing     Problem: Pain Acute  Goal: Optimal Pain Control and Function  Outcome: Progressing  Intervention: Prevent or Manage Pain  Recent Flowsheet Documentation  Taken 4/11/2025 0200 by Ratna Campo RN  Medication Review/Management: medications reviewed   Goal Outcome Evaluation:    A&O. VSS. Prn IV zofran given for nausea, effective. Having several loose stools overnight. Up independently in room.

## 2025-04-11 NOTE — DISCHARGE INSTRUCTIONS
[facility] PLAN:  -Miralax 1 capful daily on discharge, ok to hold for loose stools  -Low fiber diet  -Zofran as needed for nausea control  -Transition to oral steroids on discharge  -Prednisone taper 40 mg daily, decrease by 5 mg weekly for a total of 8 weeks  -Follow up with [facility] IBD provider at [facility] in the next several weeks, we'll call to schedule    [facility]  [phone] (office)

## 2025-04-11 NOTE — DISCHARGE SUMMARY
"[facility] MEDICINE  DISCHARGE SUMMARY     Primary Care Physician: No Ref-Primary, Physician  Admission Date: 4/7/2025   Discharge Provider: Mauricio Garcia MD Discharge Date: 4/11/2025    Diet:   Active Diet and Nourishment Order   Procedures    Low Fiber Diet    Diet     Code Status: Full Code   Activity: DCACTIVITY: Activity as tolerated        Condition at Discharge: Stable     REASON FOR PRESENTATION(See Admission Note for Details)   Significant abdominal pain with bright red blood per rectum    PRINCIPAL & ACTIVE DISCHARGE DIAGNOSES     Principal Problem:    Exacerbation of ulcerative colitis without complication (H)  Active Problems:    Anxiety state    Hx of traumatic brain injury    ADHD (attention deficit hyperactivity disorder)  Chronic anemia  Leukocytosis    Clinically Significant Risk Factors                              # Overweight: Estimated body mass index is 25.69 kg/m  as calculated from the following:    Height as of this encounter: 1.6 m (5' 3\").    Weight as of this encounter: 65.8 kg (145 lb).             PENDING LABS     Unresulted Labs Ordered in the Past 30 Days of this Admission       No orders found from 3/8/2025 to 4/8/2025.          PROCEDURES ( this hospitalization only)      None    RECOMMENDATIONS TO OUTPATIENT PROVIDER FOR F/U VISIT     Follow-up Appointments       Follow Up      Follow up with [facility] per their recommendations.  Make certain patient has office phone number.        Hospital to Primary Care - Establish PCP Referral      Please be aware that coverage of these services is subject to the terms and limitations of your health insurance plan.  Call member services at your health plan with any benefit or coverage questions.    Schedule Primary Care visit within: 30 Days                 DISPOSITION     Home    SUMMARY OF HOSPITAL COURSE:      Nicki Mckeon is a 44 year old female with PMH signficant for ulcerative colitis, TBI, " anxiety, distant history of alcohol abuse in remission.  Ulcerative colitis is coordinated with [facility].  Receiving infliximab every 6 weeks most recently 3/7/2025.  On 4/3 underwent EGD showing chronic gastric inflammation, negative for H. pylori and colonoscopy showed severe inflammation throughout the colon with large deep ulcers.  Taking oral prednisone but abdominal pain 10 out of 10 and small-volume bloody diarrhea continued.  4/7/2025 seen in the ED.  Hemoglobin 9.1.  CT scan showed diffuse colonic thickening consistent with colitis flare.  Admitted.      1.  GI.  [facility] consulted and recommended initiation of IV Solu-Medrol 20 mg IV every 8 hours.  Initially feeling substantially better however with initiation of low fiber diet having increasing spasm-like abdominal pain and continued bloody bowel movements.  Subsequently had slow gradual improvement.  Hemoglobin remained stable.  Evening of 4/10 denies any abdominal pain but had frequent small-volume diarrhea and had not been eating.  4/11 did have low fiber diet for breakfast and lunch and abdominal pain has stayed away and she has had very minimal stool output.  Seen by [facility] and cleared for discharge home on prolonged steroid taper.  Will be following closely with [facility].    2.  Neurologic.  Patient had struggles associated to her anxiety and TBI during her stay.  For her anxiety, initiated as needed lorazepam and scheduled Lexapro.  Had been on Lexapro in the past to good effect.  Will be continuing Lexapro at discharge and was given a limited prescription of lorazepam.  Should follow-up with her primary care provider after discharge.    Medically otherwise stable    Discharge Medications with Med changes:     Current Discharge Medication List        START taking these medications    Details   acetaminophen (TYLENOL) 325 MG tablet Take 2 tablets (650 mg) by mouth every 4 hours as needed for mild pain or other (and adjunct with  "moderate or severe pain or per patient request).    Associated Diagnoses: Acute ulcerative colitis with rectal bleeding (H)      escitalopram (LEXAPRO) 10 MG tablet Take 1 tablet (10 mg) by mouth daily.  Qty: 30 tablet, Refills: 0    Associated Diagnoses: Anxiety state      LORazepam (ATIVAN) 0.5 MG tablet Take 1 tablet (0.5 mg) by mouth every 8 hours as needed for anxiety.  Qty: 10 tablet, Refills: 0    Associated Diagnoses: Anxiety state      ondansetron (ZOFRAN ODT) 4 MG ODT tab Take 1 tablet (4 mg) by mouth every 6 hours as needed for nausea or vomiting.  Qty: 20 tablet, Refills: 0    Associated Diagnoses: Acute ulcerative colitis with rectal bleeding (H)      polyethylene glycol (MIRALAX) 17 GM/Dose powder Take 17 g by mouth daily.  Qty: 510 g, Refills: 0    Associated Diagnoses: Acute ulcerative colitis with rectal bleeding (H)           CONTINUE these medications which have CHANGED    Details   predniSONE (DELTASONE) 5 MG tablet Take 8 tablets (40 mg) by mouth daily for 7 days, THEN 7 tablets (35 mg) daily for 7 days, THEN 6 tablets (30 mg) daily for 7 days, THEN 5 tablets (25 mg) daily for 7 days, THEN 4 tablets (20 mg) daily for 7 days, THEN 3 tablets (15 mg) daily for 7 days, THEN 2 tablets (10 mg) daily for 7 days, THEN 1 tablet (5 mg) daily for 7 days.  Qty: 252 tablet, Refills: 0    Associated Diagnoses: Acute ulcerative colitis with rectal bleeding (H)               Rationale for medication changes:      Tylenol for pain.  Lexapro and Ativan for anxiety.  Prednisone taper for ulcerative colitis.      Consults     GASTROENTEROLOGY IP CONSULT  CARE MANAGEMENT / SOCIAL WORK IP CONSULT      Immunizations given this encounter     Most Recent Immunizations   Administered Date(s) Administered    COVID-19 Monovalent 18+ (Moderna) 02/18/2021    Influenza Vaccine >6 months,quad, PF 12/02/2019           Anticoagulation Information      Recent INR results: No results for input(s): \"INR\" in the last 168 " hours.  Warfarin doses (if applicable) or name of other anticoagulant: N/A      SIGNIFICANT IMAGING FINDINGS     Results for orders placed or performed during the hospital encounter of 04/07/25   CT Abdomen Pelvis w Contrast    Impression    IMPRESSION:   1.  Diffuse colonic wall thickening, likely related to known ulcerative colitis. Thickening is decreased compared to 2/14/2024. No free air or intra-abdominal fluid collection.  2.  Nonobstructing nephrolithiasis.     XR Abdomen 2 Views    Impression    IMPRESSION: No bowel dilatation or evidence of obstruction. No obvious free air.         No results found for this or any previous visit (from the past 4320 hours).    SIGNIFICANT LABORATORY FINDINGS     Most Recent 3 CBC's:  Recent Labs   Lab Test 04/10/25  0701 04/09/25  0725 04/08/25  0705 04/07/25  0824 02/14/24  0954 10/19/23  0556 10/18/23  0715   WBC  --   --   --  12.8* 7.5  --  17.0*   HGB 9.3* 8.6* 8.2* 9.1* 8.6* 9.4* 9.6*   MCV  --   --   --  79 75*  --  77*   PLT  --   --   --  630* 357 569* 559*     Most Recent 3 BMP's:  Recent Labs   Lab Test 04/07/25  1536 04/07/25  0824 02/14/24  0954 10/16/23  1019   NA  --  135 141 137   POTASSIUM 4.7 5.4* 3.5 4.0   CHLORIDE  --  98 106 99   CO2  --  24 24 27   BUN  --  14.0 15.3 23.3*   CR  --  0.78 0.70 0.81   ANIONGAP  --  13 11 11   MANUEL  --  9.3 8.6 9.4   GLC  --  112* 87 126*     Most Recent 2 LFT's:  Recent Labs   Lab Test 04/07/25  0824 02/14/24  0954 10/16/23  1019   AST  --  20 14   ALT  --  12 15   ALKPHOS 91 79 82   BILITOTAL 0.3 0.3 <0.2     Most Recent 3 INR's:  Recent Labs   Lab Test 07/24/22  0822 02/14/19  1233   INR 1.03 1.08     Most Recent Hemoglobin A1c:No lab results found.  Most Recent 6 glucoses:  Recent Labs   Lab Test 04/07/25  0824 02/14/24  0954 10/16/23  1019 10/08/23  2122 01/04/23  1658 07/26/22  1026   * 87 126* 95 94 112     Most Recent Urinalysis:  Recent Labs   Lab Test 10/09/23  0017 02/14/19  1233   COLOR Light Yellow  "Yellow   APPEARANCE Turbid* Cloudy*   URINEGLC Negative Negative   URINEBILI Negative Negative   URINEKETONE Negative 100 mg/dL*   SG 1.015 1.025   UBLD >1.0 mg/dL* Large*   URINEPH 6.0 6.0   PROTEIN 70* 100 mg/dL*   UROBILINOGEN  --  <2.0 E.U./dL   NITRITE Negative Negative   LEUKEST 500 Artem/uL* Negative   RBCU >182* >100*   WBCU >182* 0-5     Most Recent ESR & CRP:  Recent Labs   Lab Test 10/18/23  0829 10/08/23  2122 07/26/22  1026   CRP  --   --  5.8*   CRPI 12.00*   < >  --     < > = values in this interval not displayed.     No results found for: \"CTROPT\"   7-Day Micro Results       Collected Updated Procedure Result Status      04/07/2025 2251 04/08/2025 0327 Enteric Bacteria and Virus Panel PCR [[id number]]    Stool from Per Rectum    Final result Component Value   Campylobacter species Negative   Salmonella species Negative   Vibrio species Negative   Vibrio cholerae Negative   Yersinia enterocolitica Negative   Enteropathogenic E. coli (EPEC) Negative   Shiga-like toxin-producing E. coli (STEC) Negative   Shigella/Enteroinvasive E. coli (EIEC) Negative   Cryptosporidium species Negative   Giardia lamblia Negative   Norovirus Gl/Gll Negative   Rotavirus A Negative   Plesiomonas shigelloides Negative   Enteroaggregative E. coli (EAEC) Negative   Enterotoxigenic E. coli (ETEC) Negative   E. coli O157 NA   Cyclospora cayetanensis Negative   Entamoeba histolytica Negative   Adenovirus F40/41 Negative   Astrovirus Negative   Sapovirus Negative            04/07/2025 2251 04/08/2025 0114 C. difficile Toxin B PCR with reflex to C. difficile EIA [[id number]]    Stool from Per Rectum    Final result Component Value   C Difficile Toxin B by PCR Negative   A negative result does not exclude actual disease due to C. difficile and may be due to improper collection, handling and storage of the specimen or the number of organisms in the specimen is below the detection limit of the assay.                      Discharge " Orders        Hospital to Primary Care - Establish PCP Referral      Reason for your hospital stay    Acute ulcerative colitis flare     Activity    Your activity upon discharge: activity as tolerated     When to contact your care team    Call [facility] if you have any of the following: temperature greater than 101, increased swelling, or increased pain.     Follow Up    Follow up with [facility] per their recommendations.  Make certain patient has office phone number.     Diet    Follow this diet upon discharge: Current Diet:Orders Placed This Encounter      Low Fiber Diet       Examination   Physical Exam   Temp:  [98  F (36.7  C)] 98  F (36.7  C)  Pulse:  [86-92] 92  Resp:  [18] 18  BP: (118-164)/(80-93) 164/93  SpO2:  [96 %] 96 %  Wt Readings from Last 4 Encounters:   04/07/25 65.8 kg (145 lb)   02/14/24 63.5 kg (140 lb)   10/16/23 64.9 kg (143 lb 1.6 oz)   10/09/23 65.5 kg (144 lb 4.8 oz)       Please see EMR for more detailed significant labs, imaging, consultant notes etc.    IMauricio MD, personally saw the patient today and spent greater than 30 minutes discharging this patient.    Mauricio Garcia MD  [facility]    CC:No Ref-Primary, Physician

## 2025-04-11 NOTE — PLAN OF CARE
Problem: Adult Inpatient Plan of Care  Goal: Absence of Hospital-Acquired Illness or Injury  Outcome: Progressing  Intervention: Identify and Manage Fall Risk  Recent Flowsheet Documentation  Taken 4/10/2025 1630 by Yohana Harper RN  Safety Promotion/Fall Prevention: safety round/check completed  Intervention: Prevent Skin Injury  Recent Flowsheet Documentation  Taken 4/10/2025 1630 by Yohana Harper RN  Body Position:   position changed independently   turned   right  Intervention: Prevent Infection  Recent Flowsheet Documentation  Taken 4/10/2025 1630 by Yohana Harper RN  Infection Prevention: hand hygiene promoted     Problem: Adult Inpatient Plan of Care  Goal: Absence of Hospital-Acquired Illness or Injury  Intervention: Identify and Manage Fall Risk  Recent Flowsheet Documentation  Taken 4/10/2025 1630 by Yohana Harper RN  Safety Promotion/Fall Prevention: safety round/check completed   Goal Outcome Evaluation:      Plan of Care Reviewed With: patient    Overall Patient Progress: improvingOverall Patient Progress: improving  Pt alert and oriented, vss on room air,  can voice needs, she is independent, on iv steroids,  pain medication given, discharge will depend on progress.

## 2025-04-11 NOTE — PLAN OF CARE
Problem: Adult Inpatient Plan of Care  Goal: Plan of Care Review  Description: The Plan of Care Review/Shift note should be completed every shift.  The Outcome Evaluation is a brief statement about your assessment that the patient is improving, declining, or no change.  This information will be displayed automatically on your shiftnote.  Outcome: Progressing  Patient reports feeling ready to discharge home. Paged Dr. Garcia @ 3:07 pm to place discharge orders if medically cleared.

## (undated) DEVICE — SUCTION MANIFOLD NEPTUNE 2 SYS 1 PORT 702-025-000

## (undated) DEVICE — SOL WATER IRRIG 1000ML BOTTLE 2F7114

## (undated) DEVICE — FORCEP BIOPSY DISP 000386

## (undated) DEVICE — TUBING SUCTION MEDI-VAC 1/4"X20' N620A - HE

## (undated) RX ORDER — DEXAMETHASONE SODIUM PHOSPHATE 4 MG/ML
INJECTION, SOLUTION INTRA-ARTICULAR; INTRALESIONAL; INTRAMUSCULAR; INTRAVENOUS; SOFT TISSUE
Status: DISPENSED
Start: 2022-07-25

## (undated) RX ORDER — PROPOFOL 10 MG/ML
INJECTION, EMULSION INTRAVENOUS
Status: DISPENSED
Start: 2022-07-25

## (undated) RX ORDER — ONDANSETRON 2 MG/ML
INJECTION INTRAMUSCULAR; INTRAVENOUS
Status: DISPENSED
Start: 2022-07-25